# Patient Record
Sex: FEMALE | Race: WHITE | NOT HISPANIC OR LATINO | Employment: UNEMPLOYED | URBAN - METROPOLITAN AREA
[De-identification: names, ages, dates, MRNs, and addresses within clinical notes are randomized per-mention and may not be internally consistent; named-entity substitution may affect disease eponyms.]

---

## 2020-03-30 ENCOUNTER — TRANSFERRED RECORDS (OUTPATIENT)
Dept: HEALTH INFORMATION MANAGEMENT | Facility: CLINIC | Age: 20
End: 2020-03-30

## 2023-02-12 ENCOUNTER — TRANSFERRED RECORDS (OUTPATIENT)
Dept: HEALTH INFORMATION MANAGEMENT | Facility: CLINIC | Age: 23
End: 2023-02-12

## 2023-10-11 ENCOUNTER — TRANSCRIBE ORDERS (OUTPATIENT)
Dept: OTHER | Age: 23
End: 2023-10-11

## 2023-10-11 DIAGNOSIS — Z86.69 HISTORY OF IDIOPATHIC INTRACRANIAL HYPERTENSION: Primary | ICD-10-CM

## 2023-10-30 PROBLEM — J45.30 MILD PERSISTENT ASTHMA WITHOUT COMPLICATION: Status: ACTIVE | Noted: 2020-03-26

## 2023-10-30 PROBLEM — M41.9 SCOLIOSIS: Status: ACTIVE | Noted: 2018-01-28

## 2023-10-30 PROBLEM — K21.9 GERD (GASTROESOPHAGEAL REFLUX DISEASE): Status: ACTIVE | Noted: 2023-09-13

## 2023-10-30 PROBLEM — R51.9 HEADACHE: Status: ACTIVE | Noted: 2023-09-13

## 2023-10-30 PROBLEM — R00.0 TACHYCARDIA: Status: ACTIVE | Noted: 2020-03-26

## 2023-10-30 PROBLEM — R74.02 ELEVATED LDH: Status: ACTIVE | Noted: 2022-05-24

## 2023-10-30 PROBLEM — F43.10 PTSD (POST-TRAUMATIC STRESS DISORDER): Chronic | Status: ACTIVE | Noted: 2023-09-13

## 2023-10-30 PROBLEM — D72.820 ELEVATED LYMPHOCYTE COUNT: Status: ACTIVE | Noted: 2022-05-13

## 2023-10-30 PROBLEM — F84.5 ASPERGER SYNDROME: Status: ACTIVE | Noted: 2018-11-21

## 2023-10-30 PROBLEM — G47.00 INSOMNIA: Status: ACTIVE | Noted: 2023-09-13

## 2023-10-30 RX ORDER — CYCLOBENZAPRINE HCL 10 MG
10 TABLET ORAL 3 TIMES DAILY PRN
COMMUNITY
Start: 2022-02-12

## 2023-10-30 RX ORDER — METOPROLOL SUCCINATE 50 MG/1
1 TABLET, EXTENDED RELEASE ORAL AT BEDTIME
COMMUNITY
Start: 2023-08-02

## 2023-10-30 RX ORDER — BUDESONIDE AND FORMOTEROL FUMARATE DIHYDRATE 80; 4.5 UG/1; UG/1
2 AEROSOL RESPIRATORY (INHALATION) PRN
COMMUNITY
Start: 2023-10-24

## 2023-10-30 RX ORDER — DILTIAZEM HYDROCHLORIDE 30 MG/1
TABLET, FILM COATED ORAL
COMMUNITY
Start: 2022-02-16 | End: 2024-01-10

## 2023-10-30 RX ORDER — MECLIZINE HYDROCHLORIDE 25 MG/1
12.5-25 TABLET ORAL 3 TIMES DAILY PRN
COMMUNITY
Start: 2023-10-24

## 2023-10-30 RX ORDER — HYDROXYZINE PAMOATE 25 MG/1
25 CAPSULE ORAL PRN
COMMUNITY
Start: 2023-08-02

## 2023-10-30 RX ORDER — SERTRALINE HYDROCHLORIDE 25 MG/1
75 TABLET, FILM COATED ORAL AT BEDTIME
COMMUNITY
Start: 2023-11-01

## 2023-10-30 RX ORDER — QUETIAPINE FUMARATE 150 MG/1
1 TABLET, FILM COATED ORAL AT BEDTIME
COMMUNITY
Start: 2023-08-02

## 2023-10-30 RX ORDER — VENLAFAXINE HYDROCHLORIDE 37.5 MG/1
CAPSULE, EXTENDED RELEASE ORAL
COMMUNITY
Start: 2023-10-27 | End: 2023-12-14

## 2023-10-30 NOTE — PROGRESS NOTES
Leigh Sloan is a 23 year old female with the following diagnoses:   1. Optic disc edema    2. History of idiopathic intracranial hypertension         Patient was sent for consultation by Dr. Burnett for Dr. Burnett    HPI:    Leigh Sloan has a history of IIH diagnosed March 2020. She reports that at that time she had an LP done in [place] at her opening pressure was [id number] (records not available). She was shunted emergently the next day and she states that it was secondary to pain. We discussed her visual symptoms at that time and she reports that she had an eye exam prior to the LP which showed bilateral optic disc edema. She does not remember having been told that her visual acuity was decreased nor that she had constriction of visual fields. She never took diamox because she had a sulfa allergy as a child (fever and 1 week of hospitalization).     Her initial symptoms in 2020: pulsatile tinnitus in both ears. Debilitating headaches to the point where she couldn't move due to the pain. Does not remember any TVOs. No diplopia. Does not remember blurry vision.    About 2-3 months ago she started noticing right ear pulsatile tinnitus. Over the next month she started to notice bilateral pulsatile tinnitus. She also noticed headaches are worsening over the last 2-3 months (although much better than 2020). Headaches are occipital/parietal and bilateral. They last about 5 minutes and are a pressure or a sharp sensation. She denies light/sound sensitivity, nausea, and vomiting specific to the headaches. She does have light sensitivity all the time which has been most of her life. She has been noticing blackouts of her vision if she stands too quickly or strains - this lasts less than a minute and is bilateral.    She was evaluated in the ED 9/12/23 and had CT head without ventricular dilation. LP showed opening pressure of 25 cm H2O in the ED (on left side with knees bent and curled into chest).  She had XR shunt series performed which showed no kinking or discontinuity of the tubing and valve set to [id number].    After the LP she noted about 1 day of symptomatic improvement but then had pain with upright positioning and required a blood patch.    She has no recent weight gain. No recent use of tetracyclines. No vitamin A-derived medication    Independent historians:  Patient    Review of outside testin/12/23 lumbar puncture:  Opening pressure 25    23 CT head without contrast  FINDINGS:   Right parietal approach  shunt catheter in place with tip terminating in the left lateral ventricle. No abnormal ventricular dilatation. No extra-axial fluid collections.   Normal brain parenchymal morphology. No acute intracranial hemorrhage, acute infarct, mass effect, fracture. No midline shift. Normal calvarium and skull base. Visualized paranasal sinuses and mastoid air cells are clear.   Normal orbits bilaterally.     10/11/23 XR shunt series  Impression  1. No kinking or discontinuity of the shunt tubing.  2. A programmable shunt valve is set to 140 mm H2O.     My interpretation performed today of outside testing:  I have independently reviewed CT head performed 23.  There are no abnormalities that would explain her papilledema.    Review of outside clinical notes:    10/10/23 -- Visit with Dr. Burnett  Assessment/Plan:  1. History of idiopathic intracranial hypertension  2. Papilledema   -originally diagnosed in , had  shunt . Increase in symptoms recently, transient vision disturbances and headaches with postural changes. Several ER visits in past month, Lumbar puncture 25 at ER visit 23. RNFL OCT images consistent with papilledema with mild swelling noted on ophthalmoscopy. Patient allergic to acetazolamide. Discussed weight loss. Follow up with PCP for referral to weight management department.   3. Myopia of both eyes  -continue with habitual prescription.     23 --  Discharge summary  Ms. Leigh Sloan is a 23 y.o. female with a history of idiopathic intracranial hypertension (s/p  shunt) who presented to the ED with neck pain, back pain and headache.  She was initially seen in clinic 9/12 with complaints of dizziness, blurry vision and intermittent headache for 3-4 weeks. She was diagnosed with idiopathic intracranial hypertension in 2020 and had a  shunt placed in [place], [place] (Dr. Chris Campos, patient cannot remember the clinic). She reports symptoms were similar to when she was first diagnosed prior to her shunt.  Her primary care physician then sent her to the ED for further evaluation.  CT head without acute concerns, noted right parietal  shunt catheter with no ventricular dilatation. Shunt series done but report pending. LP was then done in the ED with opening pressure of 25, no CSF studies done since symptoms not consistent with meningitis or encephalitis.  6 mL CSF removed for therapeutic purposes per notes. It does not appear neurosurgery was contacted prior to LP.  She was referred to neurology and ophthalmology.  She reports very shortly after the LP she developed low back pain at the insertion site that then radiated up her spine to her neck. She took an Aleve without much relief. Then she woke up with a headache behind her eyes she reported up to 10/10 pain at times.  Reports the headache feels different than her usual headaches from intracranial hypertension.  Headache improves when laying flat.  Denies any numbness, tingling or weakness in her extremities.   In the ED, CT of the thoracic and lumbar spine without contrast did not show any hematoma.  She was given Dilaudid and 1 L NS with some improvement in headache. Admitted for pain control and possible blood patch pending neurosurgery consult. Started on decadron and caffeine. Neurosurgery reviewed imaging and agree with blood patch. No further recommendations. Patient underwent blood patch  9/14/2023. Some post procedure discomfort treated with ice pack and tylenol.   Patient symptomatically improved and was medically stable to discharge home 9/14/2023. No medication changes made. She should follow up with Neurology and ophthalmology as previously directed.      Past medical history:    Patient Active Problem List   Diagnosis    ADHD (attention deficit hyperactivity disorder), inattentive type    Asperger syndrome    Benign intracranial hypertension    Dermatillomania    Elevated LDH    Elevated lymphocyte count    GERD (gastroesophageal reflux disease)    Headache    Insomnia    Mild persistent asthma without complication    Postural dizziness    PTSD (post-traumatic stress disorder)    S/P  shunt    Scoliosis    Tachycardia         Medications:     Current Outpatient Medications:     budesonide-formoterol (SYMBICORT) 80-4.5 MCG/ACT Inhaler, Inhale 2 puffs into the lungs, Disp: , Rfl:     cyclobenzaprine (FLEXERIL) 10 MG tablet, Take 10 mg by mouth, Disp: , Rfl:     hydrOXYzine (VISTARIL) 25 MG capsule, Take 25 mg by mouth, Disp: , Rfl:     meclizine (ANTIVERT) 25 MG tablet, Take 12.5-25 mg by mouth, Disp: , Rfl:     meloxicam (MOBIC) 15 MG tablet, Take 15 mg by mouth, Disp: , Rfl:     metoclopramide (REGLAN) 10 MG tablet, Take 10 mg by mouth, Disp: , Rfl:     metoprolol succinate ER (TOPROL XL) 50 MG 24 hr tablet, Take 1 tablet by mouth daily, Disp: , Rfl:     omeprazole (PRILOSEC) 20 MG DR capsule, Take 20 mg by mouth, Disp: , Rfl:     ondansetron (ZOFRAN) 4 MG tablet, Take 4 mg by mouth, Disp: , Rfl:     prazosin (MINIPRESS) 2 MG capsule, Take 1 capsule by mouth at bedtime, Disp: , Rfl:     QUEtiapine Fumarate 150 MG TABS, Take 1 tablet by mouth at bedtime, Disp: , Rfl:     venlafaxine (EFFEXOR XR) 37.5 MG 24 hr capsule, Decrease total daily dose by 37.5 mg orally every three days to taper and discontinue., Disp: , Rfl:     diltiazem (CARDIZEM) 30 MG tablet, TAKE 1 TABLET BY MOUTH EVERY TWELVE  HOURS, Disp: , Rfl:     drospirenone-ethinyl estradiol (SAMMY) 3-0.02 MG tablet, Take 1 tablet by mouth daily, Disp: , Rfl:     [START ON 11/1/2023] sertraline (ZOLOFT) 25 MG tablet, Take 25 mg by mouth once daily for three days, then take 50 mg by mouth once daily for six days, then take 75 mg by mouth daily., Disp: , Rfl:         Family history / social history:  Patient has no family history of macular degeneration or glaucoma    Patient  reports that she has never smoked. She has never used smokeless tobacco.       Exam:  Visual acuity 20/25 right eye 20/20 left eye.  Color vision 11/11 right eye and 11/11 left eye.  Pupils round without afferent pupillary defect.  Intraocular pressure 15 right eye and 16 left eye.  Anterior segment exam unremarkable.  Fundus exam showed bilateral optic disc edema.      Tests ordered and interpreted today:    Glaucoma Top OU          Performed by: brenda   . Patient cooperation: Reliable   .   Great fix. Dilated after vf.     Right Eye  Reliability of the test: Good   . Findings: Nonspecific defect   . Test Findings Free Text: scattered losses   . MD: -2.8   . Plan: Monitor   . Interval: Initial   .     Left Eye  Reliability of the test: Good   . Findings: Nonspecific defect   . Test Findings Free Text: scattered losses   . MD: -2.1   . Plan: Monitor   . Interval: Initial   .          OCT Optic Nerve RNFL Spectralis OU (both eyes)          Performed by: amna   .     Right Eye  Average Thickness Value: 122   . Interpretation Free Text: Superior elevation   . Plan: Adjust medications   . Interval: Initial   .     Left Eye  Average Thickness Value: 139   . Interpretation Free Text: Superior elevation   . Plan: Adjust medications   . Interval: Initial   .              Discussion of management / interpretation with another provider:   I communicated with neurosurgery and they agreed to see her and evaluate her shunt    Assessment/Plan:   It is my impression that patient has bilateral  optic disc edema which likely represents a recurrence of IIH.  Her opening pressure was 25 which is consistent with an elevated opening pressure.  The bilateral optic disc edema in conjunction with increased headaches, pulsatile tinnitus, and TVO are concerning for elevated intracranial pressure.  She recently had her shunt assessed and it was felt that her shunt was in good working order.  She apparently has an adjustable shunt placed and I wonder if it is possible to have it adjusted lower.  I will reach out to neurosurgery to find out who she might see for this.          Attending Physician Attestation:  Complete documentation of historical and exam elements from today's encounter can be found in the full encounter summary report (not reduplicated in this progress note).  I personally obtained the chief complaint(s) and history of present illness.  I confirmed and edited as necessary the review of systems, past medical/surgical history, family history, social history, and examination findings as documented by others; and I examined the patient myself.  I personally reviewed the relevant tests, images, and reports as documented above.  I formulated and edited as necessary the assessment and plan and discussed the findings and management plan with the patient and family. I personally reviewed the ophthalmic test(s) associated with this encounter, agree with the interpretation(s) as documented by the resident/fellow, and have edited the corresponding report(s) as necessary.  - Carlos Cummins MD, PGY3  Ophthalmology Resident  [facility]

## 2023-10-31 ENCOUNTER — OFFICE VISIT (OUTPATIENT)
Dept: OPHTHALMOLOGY | Facility: CLINIC | Age: 23
End: 2023-10-31
Attending: OPTOMETRIST
Payer: COMMERCIAL

## 2023-10-31 DIAGNOSIS — H47.10 OPTIC DISC EDEMA: Primary | ICD-10-CM

## 2023-10-31 DIAGNOSIS — Z86.69 HISTORY OF IDIOPATHIC INTRACRANIAL HYPERTENSION: ICD-10-CM

## 2023-10-31 DIAGNOSIS — H53.40 VISUAL FIELD DEFECT: Primary | ICD-10-CM

## 2023-10-31 PROCEDURE — G0463 HOSPITAL OUTPT CLINIC VISIT: HCPCS | Performed by: OPHTHALMOLOGY

## 2023-10-31 PROCEDURE — 99204 OFFICE O/P NEW MOD 45 MIN: CPT | Mod: GC | Performed by: OPHTHALMOLOGY

## 2023-10-31 PROCEDURE — 92133 CPTRZD OPH DX IMG PST SGM ON: CPT | Performed by: OPHTHALMOLOGY

## 2023-10-31 PROCEDURE — 92083 EXTENDED VISUAL FIELD XM: CPT | Performed by: OPHTHALMOLOGY

## 2023-10-31 RX ORDER — DROSPIRENONE AND ETHINYL ESTRADIOL 0.02-3(28)
1 KIT ORAL AT BEDTIME
COMMUNITY

## 2023-10-31 RX ORDER — METOCLOPRAMIDE 10 MG/1
10 TABLET ORAL PRN
Status: ON HOLD | COMMUNITY
Start: 2023-02-21 | End: 2024-01-31

## 2023-10-31 RX ORDER — MELOXICAM 15 MG/1
15 TABLET ORAL AT BEDTIME
Status: ON HOLD | COMMUNITY
Start: 2022-08-17 | End: 2024-02-09

## 2023-10-31 RX ORDER — ONDANSETRON 4 MG/1
4 TABLET, FILM COATED ORAL EVERY 6 HOURS PRN
Status: ON HOLD | COMMUNITY
Start: 2023-02-09 | End: 2024-01-23

## 2023-10-31 RX ORDER — PRAZOSIN HYDROCHLORIDE 2 MG/1
1 CAPSULE ORAL AT BEDTIME
COMMUNITY
Start: 2022-07-12

## 2023-10-31 ASSESSMENT — CONF VISUAL FIELD
OD_NORMAL: 1
OS_NORMAL: 1
OS_INFERIOR_TEMPORAL_RESTRICTION: 0
OD_SUPERIOR_TEMPORAL_RESTRICTION: 0
METHOD: COUNTING FINGERS
OS_SUPERIOR_TEMPORAL_RESTRICTION: 0
OD_INFERIOR_TEMPORAL_RESTRICTION: 0
OS_INFERIOR_NASAL_RESTRICTION: 0
OD_INFERIOR_NASAL_RESTRICTION: 0
OS_SUPERIOR_NASAL_RESTRICTION: 0
OD_SUPERIOR_NASAL_RESTRICTION: 0

## 2023-10-31 ASSESSMENT — TONOMETRY
OS_IOP_MMHG: 16
IOP_METHOD: ICARE
OD_IOP_MMHG: 15

## 2023-10-31 ASSESSMENT — EXTERNAL EXAM - LEFT EYE: OS_EXAM: NORMAL

## 2023-10-31 ASSESSMENT — REFRACTION_WEARINGRX
OD_SPHERE: -4.50
SPECS_TYPE: SVL
OD_CYLINDER: SPHERE
OS_SPHERE: -5.00
OS_AXIS: 155
OS_CYLINDER: +0.50

## 2023-10-31 ASSESSMENT — VISUAL ACUITY
METHOD: SNELLEN - LINEAR
OD_CC: 20/25
OD_CC+: +2
OS_CC: 20/20
CORRECTION_TYPE: GLASSES

## 2023-10-31 ASSESSMENT — SLIT LAMP EXAM - LIDS
COMMENTS: NORMAL
COMMENTS: NORMAL

## 2023-10-31 ASSESSMENT — EXTERNAL EXAM - RIGHT EYE: OD_EXAM: NORMAL

## 2023-10-31 NOTE — LETTER
10/31/2023         RE:  :  MRN: Leigh Sloan  2000  [id number]     Dear Dr. Burnett,    Thank you for asking me to see your very pleasant patient, Leigh Sloan, in neuro-ophthalmic consultation.  I would like to thank you for sending your records and I have summarized them in the history of present illness.  My assessment and plan are below.  For further details, please see my attached clinic note.      Leigh Sloan is a 23 year old female with the following diagnoses:   1. Optic disc edema    2. History of idiopathic intracranial hypertension         Patient was sent for consultation by Dr. Burnett for Dr. Burnett    HPI:    Leigh Sloan has a history of IIH diagnosed 2020. She reports that at that time she had an LP done in [place] at her opening pressure was [id number] (records not available). She was shunted emergently the next day and she states that it was secondary to pain. We discussed her visual symptoms at that time and she reports that she had an eye exam prior to the LP which showed bilateral optic disc edema. She does not remember having been told that her visual acuity was decreased nor that she had constriction of visual fields. She never took diamox because she had a sulfa allergy as a child (fever and 1 week of hospitalization).     Her initial symptoms in : pulsatile tinnitus in both ears. Debilitating headaches to the point where she couldn't move due to the pain. Does not remember any TVOs. No diplopia. Does not remember blurry vision.    About 2-3 months ago she started noticing right ear pulsatile tinnitus. Over the next month she started to notice bilateral pulsatile tinnitus. She also noticed headaches are worsening over the last 2-3 months (although much better than ). Headaches are occipital/parietal and bilateral. They last about 5 minutes and are a pressure or a sharp sensation. She denies light/sound sensitivity, nausea, and vomiting  specific to the headaches. She does have light sensitivity all the time which has been most of her life. She has been noticing blackouts of her vision if she stands too quickly or strains - this lasts less than a minute and is bilateral.    She was evaluated in the ED 23 and had CT head without ventricular dilation. LP showed opening pressure of 25 cm H2O in the ED (on left side with knees bent and curled into chest). She had XR shunt series performed which showed no kinking or discontinuity of the tubing and valve set to [id number].    After the LP she noted about 1 day of symptomatic improvement but then had pain with upright positioning and required a blood patch.    She has no recent weight gain. No recent use of tetracyclines. No vitamin A-derived medication    Independent historians:  Patient    Review of outside testin/12/23 lumbar puncture:  Opening pressure 25    23 CT head without contrast  FINDINGS:   Right parietal approach  shunt catheter in place with tip terminating in the left lateral ventricle. No abnormal ventricular dilatation. No extra-axial fluid collections.   Normal brain parenchymal morphology. No acute intracranial hemorrhage, acute infarct, mass effect, fracture. No midline shift. Normal calvarium and skull base. Visualized paranasal sinuses and mastoid air cells are clear.   Normal orbits bilaterally.     10/11/23 XR shunt series  Impression  1. No kinking or discontinuity of the shunt tubing.  2. A programmable shunt valve is set to 140 mm H2O.     My interpretation performed today of outside testing:  I have independently reviewed CT head performed 23.  There are no abnormalities that would explain her papilledema.    Review of outside clinical notes:    10/10/23 -- Visit with Dr. Burnett  Assessment/Plan:  1. History of idiopathic intracranial hypertension  2. Papilledema   -originally diagnosed in , had  shunt . Increase in symptoms recently, transient  vision disturbances and headaches with postural changes. Several ER visits in past month, Lumbar puncture 25 at ER visit 09/12/23. RNFL OCT images consistent with papilledema with mild swelling noted on ophthalmoscopy. Patient allergic to acetazolamide. Discussed weight loss. Follow up with PCP for referral to weight management department.   3. Myopia of both eyes  -continue with habitual prescription.     9/14/23 -- Discharge summary  Ms. Leigh Sloan is a 23 y.o. female with a history of idiopathic intracranial hypertension (s/p  shunt) who presented to the ED with neck pain, back pain and headache.  She was initially seen in clinic 9/12 with complaints of dizziness, blurry vision and intermittent headache for 3-4 weeks. She was diagnosed with idiopathic intracranial hypertension in 2020 and had a  shunt placed in [place], [place] (Dr. Chris Campos, patient cannot remember the clinic). She reports symptoms were similar to when she was first diagnosed prior to her shunt.  Her primary care physician then sent her to the ED for further evaluation.  CT head without acute concerns, noted right parietal  shunt catheter with no ventricular dilatation. Shunt series done but report pending. LP was then done in the ED with opening pressure of 25, no CSF studies done since symptoms not consistent with meningitis or encephalitis.  6 mL CSF removed for therapeutic purposes per notes. It does not appear neurosurgery was contacted prior to LP.  She was referred to neurology and ophthalmology.  She reports very shortly after the LP she developed low back pain at the insertion site that then radiated up her spine to her neck. She took an Aleve without much relief. Then she woke up with a headache behind her eyes she reported up to 10/10 pain at times.  Reports the headache feels different than her usual headaches from intracranial hypertension.  Headache improves when laying flat.  Denies any numbness, tingling or weakness  in her extremities.   In the ED, CT of the thoracic and lumbar spine without contrast did not show any hematoma.  She was given Dilaudid and 1 L NS with some improvement in headache. Admitted for pain control and possible blood patch pending neurosurgery consult. Started on decadron and caffeine. Neurosurgery reviewed imaging and agree with blood patch. No further recommendations. Patient underwent blood patch 9/14/2023. Some post procedure discomfort treated with ice pack and tylenol.   Patient symptomatically improved and was medically stable to discharge home 9/14/2023. No medication changes made. She should follow up with Neurology and ophthalmology as previously directed.      Past medical history:  Patient Active Problem List   Diagnosis    ADHD (attention deficit hyperactivity disorder), inattentive type    Asperger syndrome    Benign intracranial hypertension    Dermatillomania    Elevated LDH    Elevated lymphocyte count    GERD (gastroesophageal reflux disease)    Headache    Insomnia    Mild persistent asthma without complication    Postural dizziness    PTSD (post-traumatic stress disorder)    S/P  shunt    Scoliosis    Tachycardia       Medications:   Current Outpatient Medications:     budesonide-formoterol (SYMBICORT) 80-4.5 MCG/ACT Inhaler, Inhale 2 puffs into the lungs, Disp: , Rfl:     cyclobenzaprine (FLEXERIL) 10 MG tablet, Take 10 mg by mouth, Disp: , Rfl:     hydrOXYzine (VISTARIL) 25 MG capsule, Take 25 mg by mouth, Disp: , Rfl:     meclizine (ANTIVERT) 25 MG tablet, Take 12.5-25 mg by mouth, Disp: , Rfl:     meloxicam (MOBIC) 15 MG tablet, Take 15 mg by mouth, Disp: , Rfl:     metoclopramide (REGLAN) 10 MG tablet, Take 10 mg by mouth, Disp: , Rfl:     metoprolol succinate ER (TOPROL XL) 50 MG 24 hr tablet, Take 1 tablet by mouth daily, Disp: , Rfl:     omeprazole (PRILOSEC) 20 MG DR capsule, Take 20 mg by mouth, Disp: , Rfl:     ondansetron (ZOFRAN) 4 MG tablet, Take 4 mg by mouth, Disp: ,  Rfl:     prazosin (MINIPRESS) 2 MG capsule, Take 1 capsule by mouth at bedtime, Disp: , Rfl:     QUEtiapine Fumarate 150 MG TABS, Take 1 tablet by mouth at bedtime, Disp: , Rfl:     venlafaxine (EFFEXOR XR) 37.5 MG 24 hr capsule, Decrease total daily dose by 37.5 mg orally every three days to taper and discontinue., Disp: , Rfl:     diltiazem (CARDIZEM) 30 MG tablet, TAKE 1 TABLET BY MOUTH EVERY TWELVE HOURS, Disp: , Rfl:     drospirenone-ethinyl estradiol (SAMMY) 3-0.02 MG tablet, Take 1 tablet by mouth daily, Disp: , Rfl:     [START ON 11/1/2023] sertraline (ZOLOFT) 25 MG tablet, Take 25 mg by mouth once daily for three days, then take 50 mg by mouth once daily for six days, then take 75 mg by mouth daily., Disp: , Rfl:       Family history / social history:  Patient has no family history of macular degeneration or glaucoma    Patient  reports that she has never smoked. She has never used smokeless tobacco.     Exam:  Visual acuity 20/25 right eye 20/20 left eye.  Color vision 11/11 right eye and 11/11 left eye.  Pupils round without afferent pupillary defect.  Intraocular pressure 15 right eye and 16 left eye.  Anterior segment exam unremarkable.  Fundus exam showed bilateral optic disc edema.      Tests ordered and interpreted today:    Glaucoma Top OU    Performed by: brenda   . Patient cooperation: Reliable   .   Great fix. Dilated after vf.     Right Eye  Reliability of the test: Good   . Findings: Nonspecific defect   . Test Findings Free Text: scattered losses   . MD: -2.8   . Plan: Monitor   . Interval: Initial   .     Left Eye  Reliability of the test: Good   . Findings: Nonspecific defect   . Test Findings Free Text: scattered losses   . MD: -2.1   . Plan: Monitor   . Interval: Initial     OCT Optic Nerve RNFL Spectralis OU (both eyes)    Performed by: amna     Right Eye  Average Thickness Value: 122   . Interpretation Free Text: Superior elevation   . Plan: Adjust medications   . Interval: Initial   .      Left Eye  Average Thickness Value: 139   . Interpretation Free Text: Superior elevation   . Plan: Adjust medications   . Interval: Initial     Discussion of management / interpretation with another provider:   I communicated with neurosurgery and they agreed to see her and evaluate her shunt    Assessment/Plan: It is my impression that patient has bilateral optic disc edema which likely represents a recurrence of IIH.  Her opening pressure was 25 which is consistent with an elevated opening pressure.  The bilateral optic disc edema in conjunction with increased headaches, pulsatile tinnitus, and TVO are concerning for elevated intracranial pressure.  She recently had her shunt assessed and it was felt that her shunt was in good working order.  She apparently has an adjustable shunt placed and I wonder if it is possible to have it adjusted lower.  I will reach out to neurosurgery to find out who she might see for this.    Again, thank you for allowing me to participate in the care of your patient.      Sincerely,    Carlos Brand MD  Professor  Ophthalmology Residency   Director of Neuro-Ophthalmology  [facility] End[facility] Chair  Departments of Ophthalmology, Neurology, and Neurosurgery  [address]  [address]  T - [phone]  F - [phone]  [email or web address]      CC: Suman Burnett, OD  [address] [facility] [address]  Via Fax: [phone]    DX = IIH, status post  shunt, recurrence

## 2023-11-01 ENCOUNTER — MYC MEDICAL ADVICE (OUTPATIENT)
Dept: NEUROSURGERY | Facility: CLINIC | Age: 23
End: 2023-11-01
Payer: COMMERCIAL

## 2023-11-01 DIAGNOSIS — Z98.2 S/P VP SHUNT: Primary | ICD-10-CM

## 2023-11-01 NOTE — TELEPHONE ENCOUNTER
LVM requesting call back to writer's direct line. Requested shunt information from patient and discussed need for appointment in neurosurgery clinic to adjust shunt settings. If patient does not know what type of shunt she has, will need a skull XR prior to appointment.

## 2023-11-07 NOTE — TELEPHONE ENCOUNTER
Patient does not know what type of shunt she has other than  shunt. Discussed that patient would need a skull XR prior to her neurosurgery visit to determine type. Patient verbalized agreement to this plan.

## 2023-11-14 NOTE — TELEPHONE ENCOUNTER
Records Requested     November 14, 2023 4:17 PM   [id number]   Facility  [facility]   Outcome 4:23 pm Sent request for imaging to be pushed to PACS. -TANVI     Records Requested     November 14, 2023 4:17 PM   [id number]   Facility  [address] [address]  P: [phone]   Fax: [phone]  Tracking # [id number]   Outcome 10:22 am Sent request for imaging disc to be sent to [facility]. -TANVI Cline on 11/16/2023 at 12:28 PM Records received via fax; sent to [facility]. -TANVI Cline on 11/20/2023 at 1:10 PM Imaging disc received and sent to  for processing. -TANVI     RECORDS RECEIVED FROM: Care Everywhere   REASON FOR VISIT: Papilledema   Date of Appt: 12/13/23 @ 1:00 pm    NOTES (FOR ALL VISITS) STATUS DETAILS   OFFICE NOTE from referring provider Care Everywhere 10/10/23 Suman Burnett OD @[facility]     OFFICE NOTE from other specialist Care Everywhere 10/31/23 Carlos Brand MD @[facility]    9/28/23, 9/12/23 Yesenia, Kevin Merlin, MD  @[facility]    2/21/23 Dominga Lutz NP @[facility]     DISCHARGE REPORT from the ER Care Everywhere 9/19/23 Jakob Michel DO  @[facility] ED    9/13/23-9/14/23 Ida Varner MD  @[facility] ED    9/12/23 Thien Anton MD  @[facility] ED    2/12/23 Jakob Randle DO  @[facility] ED     OPHTHALMOLOGY REPORT Care Everywhere [facility]  10/31/23 OCT Optic Nerve RNFL Spectralis each eye  10/31/23 Glaucoma Top each eye     MEDICATION LIST Internal    IMAGING  (FOR ALL VISITS)     X-RAY In process [facility] *  9/15/23 XR Shunt Series     MRI (HEAD, NECK, SPINE) In process [facility]*  11/13/19 MR Brain     CT (HEAD, NECK, SPINE) In process [facility]  9/12/23 CT Head    [facility]*  2/12/23 CT Head Brain  7/7/22 CT Head  10/6/20 CT Head Brain

## 2023-12-13 ENCOUNTER — ANCILLARY PROCEDURE (OUTPATIENT)
Dept: GENERAL RADIOLOGY | Facility: CLINIC | Age: 23
End: 2023-12-13
Attending: PHYSICIAN ASSISTANT
Payer: COMMERCIAL

## 2023-12-13 ENCOUNTER — PRE VISIT (OUTPATIENT)
Dept: NEUROSURGERY | Facility: CLINIC | Age: 23
End: 2023-12-13

## 2023-12-13 ENCOUNTER — ANCILLARY PROCEDURE (OUTPATIENT)
Dept: GENERAL RADIOLOGY | Facility: CLINIC | Age: 23
End: 2023-12-13
Attending: NEUROLOGICAL SURGERY
Payer: COMMERCIAL

## 2023-12-13 ENCOUNTER — OFFICE VISIT (OUTPATIENT)
Dept: NEUROSURGERY | Facility: CLINIC | Age: 23
End: 2023-12-13
Payer: COMMERCIAL

## 2023-12-13 VITALS
SYSTOLIC BLOOD PRESSURE: 120 MMHG | OXYGEN SATURATION: 95 % | WEIGHT: 262.1 LBS | BODY MASS INDEX: 43.67 KG/M2 | DIASTOLIC BLOOD PRESSURE: 75 MMHG | HEART RATE: 117 BPM | HEIGHT: 65 IN

## 2023-12-13 DIAGNOSIS — Z98.2 S/P VP SHUNT: ICD-10-CM

## 2023-12-13 DIAGNOSIS — Z98.2 S/P VP SHUNT: Primary | ICD-10-CM

## 2023-12-13 PROCEDURE — 70250 X-RAY EXAM OF SKULL: CPT | Mod: GC | Performed by: RADIOLOGY

## 2023-12-13 PROCEDURE — 62252 CSF SHUNT REPROGRAM: CPT | Performed by: PHYSICIAN ASSISTANT

## 2023-12-13 PROCEDURE — 99204 OFFICE O/P NEW MOD 45 MIN: CPT | Mod: 25 | Performed by: PHYSICIAN ASSISTANT

## 2023-12-13 PROCEDURE — 70250 X-RAY EXAM OF SKULL: CPT | Performed by: RADIOLOGY

## 2023-12-13 NOTE — NURSING NOTE
"Chief Complaint   Patient presents with    Consult     Per patient brain shunt adjustment   /75   Pulse 117   Ht 1.651 m (5' 5\")   Wt 118.9 kg (262 lb 1.6 oz)   SpO2 95%   BMI 43.62 kg/m      Tanesha Farooq CMA at 12:46 PM on 12/13/2023.        "

## 2023-12-13 NOTE — PROGRESS NOTES
[facility]  Department of Neurosurgery  Center for Skull Base and Pituitary Surgery    Name: Leigh Sloan  MRN: [id number]  Age: 23 year old  : 2023      Chief Complaint:   Intracranial hypertension s/p right parietal  shunt placement (Codman Hakim at 130mm H2O) 3/2020 ([place], Dr. Bright), new patient consult for papilledema    History of Present Illness:   Leigh Sloan is a 23 year old female with a history of ADHD, Asperger syndrome, asthma, and history of IIH s/p  shunt in  who is here today as a new patient. She had an LP done in [place] in 3/2020 due to bilateral pulsatile tinnitus and severe headaches, found with an opening pressure of 40 per patient report. Her shunt was placed next day by Dr. Bright. She did well until September of this year when she had worsening headaches and recurrence of her pulsatile tinnitus. She presented to the ED 2023 and was evaluated with LP with opening pressure of 25 cm H2O. At that time head CT showed shunted ventricular system without ventriculomegaly. She also had a shunt series without abnormality. She had post LP headaches and required a blood patch. She then saw Dr. 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 Dr. Brand referred her here in hopes that we could adjust her shunt setting to help with her recurrent papilledema. Today in clinic Leigh reports positional headaches which are sharp in nature and resolve fairly quickly. She also notes intermittent, short lived blurry vision which occurs only with position changes or if she exerts herself; this symptom resolves within 1-2 minutes of stopping the activity. Her pulsatile tinnitus has persisted. She denies new fever, chills, vomiting, weakness, or falls. Her Codman Hakim  shunt system has not been programmed since implantation in .  Of note, she has a sulfa allergy so diamox was not used in the  past.     Review of Systems:   Pertinent items are noted in HPI or as in patient entered ROS below, remainder of complete ROS is negative.     Active Medications:     Current Outpatient Medications:     budesonide-formoterol (SYMBICORT) 80-4.5 MCG/ACT Inhaler, Inhale 2 puffs into the lungs, Disp: , Rfl:     cyclobenzaprine (FLEXERIL) 10 MG tablet, Take 10 mg by mouth, Disp: , Rfl:     diltiazem (CARDIZEM) 30 MG tablet, TAKE 1 TABLET BY MOUTH EVERY TWELVE HOURS, Disp: , Rfl:     drospirenone-ethinyl estradiol (SAMMY) 3-0.02 MG tablet, Take 1 tablet by mouth daily, Disp: , Rfl:     hydrOXYzine (VISTARIL) 25 MG capsule, Take 25 mg by mouth, Disp: , Rfl:     meclizine (ANTIVERT) 25 MG tablet, Take 12.5-25 mg by mouth, Disp: , Rfl:     meloxicam (MOBIC) 15 MG tablet, Take 15 mg by mouth, Disp: , Rfl:     metoclopramide (REGLAN) 10 MG tablet, Take 10 mg by mouth, Disp: , Rfl:     metoprolol succinate ER (TOPROL XL) 50 MG 24 hr tablet, Take 1 tablet by mouth daily, Disp: , Rfl:     omeprazole (PRILOSEC) 20 MG DR capsule, Take 20 mg by mouth, Disp: , Rfl:     ondansetron (ZOFRAN) 4 MG tablet, Take 4 mg by mouth, Disp: , Rfl:     prazosin (MINIPRESS) 2 MG capsule, Take 1 capsule by mouth at bedtime, Disp: , Rfl:     QUEtiapine Fumarate 150 MG TABS, Take 1 tablet by mouth at bedtime, Disp: , Rfl:     sertraline (ZOLOFT) 25 MG tablet, Take 25 mg by mouth once daily for three days, then take 50 mg by mouth once daily for six days, then take 75 mg by mouth daily., Disp: , Rfl:     venlafaxine (EFFEXOR XR) 37.5 MG 24 hr capsule, Decrease total daily dose by 37.5 mg orally every three days to taper and discontinue., Disp: , Rfl:       Allergies:   Sulfa antibiotics, Sulfamethoxazole-trimethoprim, and Trimethoprim      Past Medical History:  Past Medical History:   Diagnosis Date    Acid reflux     Asthma     High cholesterol     Non-alcoholic fatty liver disease     Scoliosis      Patient Active Problem List   Diagnosis    ADHD  "(attention deficit hyperactivity disorder), inattentive type    Asperger syndrome    Benign intracranial hypertension    Dermatillomania    Elevated LDH    Elevated lymphocyte count    GERD (gastroesophageal reflux disease)    Headache    Insomnia    Mild persistent asthma without complication    Postural dizziness    PTSD (post-traumatic stress disorder)    S/P  shunt    Scoliosis    Tachycardia        Past Surgical History:  No past surgical history on file.    Family History:   Family History   Problem Relation Age of Onset    Nystagmus No family hx of     Strabismus No family hx of     Amblyopia No family hx of     Glasses (<7 y/o) No family hx of          Social History:   Social History     Tobacco Use    Smoking status: Never    Smokeless tobacco: Never        Physical Exam:   /75   Pulse 117   Ht 1.651 m (5' 5\")   Wt 118.9 kg (262 lb 1.6 oz)   SpO2 95%   BMI 43.62 kg/m     General: No acute distress.   Eyes: Conjunctivae are normal.  MSK: Moves all extremities.  No obvious deformity.  Neuro: The patient is fully oriented. Speech is normal. Facial nerve function is normal, rated as a House Brackmann 1. Gait is normal  Psych: Normal mood and affect. Behavior is normal.      Procedure:  With the patient's permission, her right parietal shunt valve was palpated and the programming wand was placed over the valve. Selection of 120 was made on the [employer] VPV . Multiple attempts were made with no confirmatory beep. My colleague, Carmela Lara CNP, also attempted to program Ms. Sloan's shunt today without success.     Imaging:  Post procedure the patient went down for repeat skull xray and unfortunately this looks like her valve is still set to 130     Assessment:  Intracranial hypertension s/p right parietal  shunt placement (Madeline Kennedy at 130mm H2O) [place] new patient consult for papilledema    Plan:  Discussed with Dr. Cabrera and reviewed my attempts in clinic to " reprogram her Hakim valve. We will bring her in tomorrow for one more attempt and also discussed the potential need for swapping out her valve if we are unsuccessful. Ms. Sloan is unfortunately leaving for a 2 week trip out of town this weekend, back the first week of January, so we will discuss timing with Dr. Cabrera should we need to intervene surgically.       Dolly Garcia PA-C  Department of Neurosurgery  Center for Skull Base and Pituitary Surgery  [facility]

## 2023-12-14 ENCOUNTER — OFFICE VISIT (OUTPATIENT)
Dept: NEUROSURGERY | Facility: CLINIC | Age: 23
End: 2023-12-14
Payer: COMMERCIAL

## 2023-12-14 ENCOUNTER — ANCILLARY PROCEDURE (OUTPATIENT)
Dept: GENERAL RADIOLOGY | Facility: CLINIC | Age: 23
End: 2023-12-14
Attending: NURSE PRACTITIONER
Payer: COMMERCIAL

## 2023-12-14 VITALS
WEIGHT: 262 LBS | SYSTOLIC BLOOD PRESSURE: 114 MMHG | OXYGEN SATURATION: 97 % | HEART RATE: 97 BPM | RESPIRATION RATE: 16 BRPM | HEIGHT: 65 IN | DIASTOLIC BLOOD PRESSURE: 77 MMHG | BODY MASS INDEX: 43.65 KG/M2

## 2023-12-14 DIAGNOSIS — Z98.2 S/P VP SHUNT: ICD-10-CM

## 2023-12-14 DIAGNOSIS — Z98.2 S/P VP SHUNT: Primary | ICD-10-CM

## 2023-12-14 PROCEDURE — 70250 X-RAY EXAM OF SKULL: CPT | Performed by: RADIOLOGY

## 2023-12-14 PROCEDURE — 62252 CSF SHUNT REPROGRAM: CPT | Performed by: NURSE PRACTITIONER

## 2023-12-14 PROCEDURE — 99215 OFFICE O/P EST HI 40 MIN: CPT | Mod: 25 | Performed by: NURSE PRACTITIONER

## 2023-12-14 RX ORDER — ALBUTEROL SULFATE 90 UG/1
AEROSOL, METERED RESPIRATORY (INHALATION)
COMMUNITY
Start: 2023-10-05 | End: 2024-01-10

## 2023-12-14 RX ORDER — CETIRIZINE HYDROCHLORIDE 10 MG/1
10 TABLET ORAL PRN
COMMUNITY

## 2023-12-14 RX ORDER — ONDANSETRON 4 MG/1
TABLET, ORALLY DISINTEGRATING ORAL
Status: ON HOLD | COMMUNITY
Start: 2023-02-21 | End: 2024-01-31

## 2023-12-14 ASSESSMENT — PAIN SCALES - GENERAL: PAINLEVEL: MILD PAIN (3)

## 2023-12-14 NOTE — LETTER
12/14/2023       RE: Leigh Sloan  [address]  [place] [facility] [address]       Dear Colleague,    Thank you for referring your patient, Leigh Sloan, to the [facility] [place] [facility]. Please see a copy of my visit note below.        Neurosurgery Clinic Note    Chief Complaint: shunt check/reprogram     DATE OF VISIT: 12/14/2023    HPI: Leigh Sloan is a pleasant 23 year old female with history of IIH s/p Codman Hakim ventriculoperitoneal [place]unt [place] @130 [place]   Prior to placement patient states she had developed vision loss and pulsatile tinnitus.  Following placement of shunt the patient states her symptoms improved.   Approximately 2-3 months ago the patient began to note worsening headaches, return of pulsatile tinnitus, worsening of vision.  She presented to the ED 9/12/2023 and was evaluated with LP with opening pressure of 25 cm H2O. At that time head CT showed shunted ventricular system without ventriculomegaly. She also had a shunt series without abnormality. She had post LP headaches and required a blood patch. She then saw Dr. 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 Dr. Brand referred her here in hopes that we could adjust her shunt setting to help with her recurrent papilledema.   Given these findings it was felt the patient's valve should be adjusted to a lower setting.  Patient presented to clinic yesterday and was seen by my colleague, Dolly Garcia PA-C, for reprogramming of her shunt.  Unfortunately, after multiple attempts and confirmation x-rays, the valve was note responding to the  and did not dial down to the desired setting.   Patient presents today to re-attempt reprogramming of her valve.    Currently, the patient continues to note pulsatile tinnitus and positional headaches.  She states she  "continues to have vision changes but states that these changes are overall stable compared to when she was seen by Dr. Brand in Neuro Ophthalmology in October.  She denies new fever, chills, vomiting, weakness, or falls.         Review of Systems   Negative except in HPI    Past Medical History:   Diagnosis Date    Acid reflux     Asthma     High cholesterol     Non-alcoholic fatty liver disease     Scoliosis        No past surgical history on file.    Social History     Socioeconomic History    Marital status: Single     Spouse name: None    Number of children: None    Years of education: None    Highest education level: None   Tobacco Use    Smoking status: Never    Smokeless tobacco: Never       family history is not on file.              Physical Exam   /77 (BP Location: Right arm, Patient Position: Sitting)   Pulse 97   Resp 16   Ht 1.651 m (5' 5\")   Wt 118.8 kg (262 lb)   SpO2 97%   BMI 43.60 kg/m    Constitutional: Oriented to person, place, and time. Appears well-developed and well-nourished. Cooperative. No distress.   HENT: Head normocephalic and atraumatic.   Neurological: alert and oriented to person, place, and time. CN II-XII grossly  intact      STRENGTH LEFT RIGHT   Deltoid 5 5   Bicep 5 5   Wrist Extensor 5 5   Tricep 5 5   Finger flexion 5 5   Finger abduction 5 5    5 5       Hip Flexion     5     5   Knee Extension 5 5   Ankle Dorsiflexion 5 5   Extensor Hallucis Longus 5 5   Plantar Flexion 5 5   Foot eversion 5 5   Foot inversion 5 5       Skin: Skin is warm, dry and intact.   Psychiatric: Normal mood and affect. Speech is normal and behavior is normal.    Procedure:   I attempted over 10 times to reprogram the patient's Hakim valve with [employer] program without success.   Omar Gibson MD also attempted to reprogram the valve without success.           IMAGING:  Personally reviewed skull x-ray dated 12/14/2023:   It appears the patient Hakim valve remains at 130mm H20.   No " fracture or disconnection in system otherwise noted.     ASSESSMENT:  Leigh Sloan is a 23 year old female with history of Idiopathic Intracranial Hypertension with Codam Hakim valve @ [id number], currently describing worsening headaches and vision changes with known papilledema.     PLAN:    Unfortunately, after multiple attempts with two different programmers, I was unsuccessful in reprogramming the valve.  Patient will be discussed with Dr. Calvin Cabrera, once plan is established the patient will be updated.   This was all explained to the patient who states understanding.   Symptoms that should prompt urgent medical attention were discussed with patient including worsening vision and headache.               I spent over 60 minutes in patient care with greater than 50% spent in counseling and/or coordination of care.     I performed independent visualization of radiographic imaging and entered my own interpretation, reviewed and/or ordered tests in radiology        Rm 55      Again, thank you for allowing me to participate in the care of your patient.      Sincerely,    NOHEMI Piedra CNP

## 2023-12-14 NOTE — PROGRESS NOTES
Neurosurgery Clinic Note    Chief Complaint: shunt check/reprogram     DATE OF VISIT: 12/14/2023    HPI: Leigh Sloan is a pleasant 23 year old female with history of IIH s/p Codman Hakim ventriculoperitoneal shunt [place] @130 [place]   Prior to placement patient states she had developed vision loss and pulsatile tinnitus.  Following placement of shunt the patient states her symptoms improved.   Approximately 2-3 months ago the patient began to note worsening headaches, return of pulsatile tinnitus, worsening of vision.  She presented to the ED 9/12/2023 and was evaluated with LP with opening pressure of 25 cm H2O. At that time head CT showed shunted ventricular system without ventriculomegaly. She also had a shunt series without abnormality. She had post LP headaches and required a blood patch. She then saw Dr. 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 Dr. Brand referred her here in hopes that we could adjust her shunt setting to help with her recurrent papilledema.   Given these findings it was felt the patient's valve should be adjusted to a lower setting.  Patient presented to clinic yesterday and was seen by my colleague, Dolly Garcia PA-C, for reprogramming of her shunt.  Unfortunately, after multiple attempts and confirmation x-rays, the valve was note responding to the  and did not dial down to the desired setting.   Patient presents today to re-attempt reprogramming of her valve.    Currently, the patient continues to note pulsatile tinnitus and positional headaches.  She states she continues to have vision changes but states that these changes are overall stable compared to when she was seen by Dr. Brand in Neuro Ophthalmology in October.  She denies new fever, chills, vomiting, weakness, or falls.         Review of Systems   Negative except in HPI    Past Medical History:   Diagnosis Date    Acid reflux      "Asthma     High cholesterol     Non-alcoholic fatty liver disease     Scoliosis        No past surgical history on file.    Social History     Socioeconomic History    Marital status: Single     Spouse name: None    Number of children: None    Years of education: None    Highest education level: None   Tobacco Use    Smoking status: Never    Smokeless tobacco: Never       family history is not on file.              Physical Exam   /77 (BP Location: Right arm, Patient Position: Sitting)   Pulse 97   Resp 16   Ht 1.651 m (5' 5\")   Wt 118.8 kg (262 lb)   SpO2 97%   BMI 43.60 kg/m    Constitutional: Oriented to person, place, and time. Appears well-developed and well-nourished. Cooperative. No distress.   HENT: Head normocephalic and atraumatic.   Neurological: alert and oriented to person, place, and time. CN II-XII grossly  intact      STRENGTH LEFT RIGHT   Deltoid 5 5   Bicep 5 5   Wrist Extensor 5 5   Tricep 5 5   Finger flexion 5 5   Finger abduction 5 5    5 5       Hip Flexion     5     5   Knee Extension 5 5   Ankle Dorsiflexion 5 5   Extensor Hallucis Longus 5 5   Plantar Flexion 5 5   Foot eversion 5 5   Foot inversion 5 5       Skin: Skin is warm, dry and intact.   Psychiatric: Normal mood and affect. Speech is normal and behavior is normal.    Procedure:   I attempted over 10 times to reprogram the patient's Hakim valve with [employer] program without success.   Omar Gibson MD also attempted to reprogram the valve without success.           IMAGING:  Personally reviewed skull x-ray dated 12/14/2023:   It appears the patient Hakim valve remains at 130mm H20.   No fracture or disconnection in system otherwise noted.     ASSESSMENT:  Leigh Sloan is a 23 year old female with history of Idiopathic Intracranial Hypertension with Codam Hakim valve @ [id number], currently describing worsening headaches and vision changes with known papilledema.     PLAN:    Unfortunately, after multiple " attempts with two different programmers, I was unsuccessful in reprogramming the valve.  Patient will be discussed with Dr. Calvin Cabrera, once plan is established the patient will be updated.   This was all explained to the patient who states understanding.   Symptoms that should prompt urgent medical attention were discussed with patient including worsening vision and headache.               I spent over 60 minutes in patient care with greater than 50% spent in counseling and/or coordination of care.     I performed independent visualization of radiographic imaging and entered my own interpretation, reviewed and/or ordered tests in radiology        NOHEMI Salter, CNP  Department of Neurosurgery  Pager: [phone]

## 2023-12-15 ENCOUNTER — TELEPHONE (OUTPATIENT)
Dept: NEUROSURGERY | Facility: CLINIC | Age: 23
End: 2023-12-15
Payer: COMMERCIAL

## 2023-12-15 NOTE — CONFIDENTIAL NOTE
I spoke with Leigh regarding next steps. She leaves tomorrow for [place], will be gone for 2 weeks. Her symptoms are intermittent and positional including headaches and blurry vision episodes. Constant tinnitus.     I will work on setting up a virtual visit with Dr. Cabrera to discuss next steps. She was advised to present to the ED with progressive worsening while she's home.     Dolly Garcia PA-C  Department of Neurosurgery  Center for Skull Base and Pituitary Surgery  [facility]

## 2024-01-03 ENCOUNTER — ANCILLARY PROCEDURE (OUTPATIENT)
Dept: CT IMAGING | Facility: CLINIC | Age: 24
End: 2024-01-03
Attending: NEUROLOGICAL SURGERY
Payer: COMMERCIAL

## 2024-01-03 ENCOUNTER — OFFICE VISIT (OUTPATIENT)
Dept: NEUROSURGERY | Facility: CLINIC | Age: 24
End: 2024-01-03
Payer: COMMERCIAL

## 2024-01-03 VITALS
WEIGHT: 265.5 LBS | BODY MASS INDEX: 44.24 KG/M2 | DIASTOLIC BLOOD PRESSURE: 77 MMHG | OXYGEN SATURATION: 97 % | HEART RATE: 97 BPM | SYSTOLIC BLOOD PRESSURE: 115 MMHG | HEIGHT: 65 IN | RESPIRATION RATE: 18 BRPM

## 2024-01-03 DIAGNOSIS — G93.2 IIH (IDIOPATHIC INTRACRANIAL HYPERTENSION): Primary | ICD-10-CM

## 2024-01-03 DIAGNOSIS — G93.2 IIH (IDIOPATHIC INTRACRANIAL HYPERTENSION): ICD-10-CM

## 2024-01-03 PROCEDURE — 99215 OFFICE O/P EST HI 40 MIN: CPT | Performed by: NEUROLOGICAL SURGERY

## 2024-01-03 PROCEDURE — 70450 CT HEAD/BRAIN W/O DYE: CPT | Mod: GC | Performed by: RADIOLOGY

## 2024-01-03 RX ORDER — CEFAZOLIN SODIUM IN 0.9 % NACL 3 G/100 ML
3 INTRAVENOUS SOLUTION, PIGGYBACK (ML) INTRAVENOUS SEE ADMIN INSTRUCTIONS
Status: CANCELLED | OUTPATIENT
Start: 2024-01-03

## 2024-01-03 RX ORDER — CEFAZOLIN SODIUM IN 0.9 % NACL 3 G/100 ML
3 INTRAVENOUS SOLUTION, PIGGYBACK (ML) INTRAVENOUS
Status: CANCELLED | OUTPATIENT
Start: 2024-01-03

## 2024-01-03 NOTE — NURSING NOTE
Nurse Teaching  Relevant Diagnosis: IIH s/p  shunt  Teaching Topic:  shunt, pre/lynette/postop instructions  Person(s) involved in teaching: Patient    Reviewed all instructions in AVS, including hospital COVID-19 restrictions, NPO prior to surgery, showering before surgery instructions (received antiseptic surgical soap), healthcare directive, postoperative cares, activity restrictions, pain management, and postop appointments. Patient has writer's direct contact information and was encouraged to call with any questions or concerns prior to surgery.

## 2024-01-03 NOTE — NURSING NOTE
Chief Complaint   Patient presents with    New Patient     Chief Complaint   Patient presents with    New Patient   Elvira Craig NRNIRMALT

## 2024-01-03 NOTE — PROGRESS NOTES
[facility]  Department of Neurosurgery    Name: Leigh Sloan  : 2000  Referring provider: Suman Burnett  24     Reason for visit: IIH s/p right occipital  shunt (Codman Hakim set to 130, placed  in [place]), new patient visit    Dear Dr. Burnett and Dr. Brand,    It was a pleasure to see Ms. Sloan in the [facility] today in followup. As you recall, Ms. Sloan is a pleasant 23 year-old right handed female with history of  IIH s/p  shunt in  in [place]. She has not required reprogramming or revision surgery. She is referred for recurrent papilledema after an exam with Dr. Brand. Prior to the placement of her  shunt, she was experiencing dizziness, headaches, blacking out, etc. She has noticed improvement in her vision since the  shunt was put in. She had an LP done in [place] in 3/2020 due to bilateral pulsatile tinnitus and severe headaches, found with an opening pressure of 40 per patient report. Her shunt was placed next day by Dr. Bright. She did well until September of this year when she had worsening headaches and recurrence of her pulsatile tinnitus. She presented to the ED 2023 and was evaluated with LP with opening pressure of 25 cm H2O. At that time head CT showed shunted ventricular system without ventriculomegaly. She also had a shunt series without abnormality. She had post LP headaches and required a blood patch. She then saw Dr. 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 Dr. Brand referred her here in hopes that we could adjust her shunt setting to help with her recurrent papilledema. Two attempts with different Hakim reprogrammers were unsuccessful in achieving a different setting by two members of our team, so I am meeting with her today to discuss options. Today in clinic Leigh reports positional headaches  which are sharp in nature and resolve fairly quickly. She also notes intermittent, short lived blurry vision which occurs only with position changes or if she exerts herself; this symptom resolves within 1-2 minutes of stopping the activity. Her pulsatile tinnitus has persisted. She denies new fever, chills, vomiting, weakness, or falls. Of note, she has a sulfa allergy so diamox was not used in the past. She denies use of blood thinners or aspirin. Her glasses prescription was last checked about 7 months ago. She notes that she had a difficult recovery from the initial shunt placement in [place] in 2020, and she was bedridden for 3 weeks after her shunt placement.    Review of Systems:   Pertinent items are noted in HPI or as in patient entered ROS below, remainder of complete ROS is negative.     Medications:   Symbicort, Zyrtec, Cyclobenzaprine, Cardizem, Drospirenone-ethinyl estradiol, hydroxyzine, Meclizine, Meloxicam, Metoclopramide, Metoprolol succinate ER, Omeprazole, Ondansetron, Prazosin, Quetiapine Fumarate, Sertraline, VENTOLIN HFA      Allergies:   Sulfa antibiotics, Sulfamethoxazole-trimethoprim, and Trimethoprim      Past Medical History:  Acid reflux, Asthma, High cholesterol, Non-alcoholic fatty liver disease, Scoliosis, ADHD, Asperger syndrome, Benign intracranial hypertension, Dermatillomania, Elevated LDH, GERD,  shunt     Past Surgical History:   shunt placement    Family History:   No known family history of nystagmus, strabismus, amblyopia     Social History:   Nonsmoker. Moved to [place] from [place]. Has support at home from parents. Parents live in [place] and she lives with a friend here in [place] now.     Physical Exam:   General: No acute distress.   Head: No signs of trauma.    Eyes: Conjunctivae are normal.  Mouth/Throat: Oropharynx moist.  Neck: Normal range of motion.    Resp: No respiratory distress.   MSK: Moves all extremities.  No obvious deformity.  Neuro: The  patient is fully oriented and quite pleasant. Speech normal. 20/20 bilaterally corrected. Extraocular movements are intact without nystagmus. Facial sensation is intact in V1, V2, V3 distributions. Facial nerve function is normal, rated as a House Brackmann 1, without synkinesis.  Palate is symmetric. Shoulders are full strength. Tongue is midline. There is no pronator drift. Full strength in all extremities. Sensation intact throughout. No dysmetria with finger-nose-finger testing.  Psych: Normal mood and affect. Behavior is normal.      Imaging:  We reviewed the x-ray imaging from 12/13/23 and compared it to previous scans which demonstrates the right parieto-occipital approach ventriculoperitoneal shunt is intact in the visualized portions of the cranium and neck. Similar virginia holes over the parietal bone. The bones of the calvarium appear otherwise normal. Dental hardware. Normal alignment of the visualized portions of the cervical vertebrae.     Assessment:  II s/p right occipital  shunt (Codman Hakim set to 130, placed 2020 in [place])    Plan:  We reviewed the patient's history, imaging, natural history and expected outcomes of conservative management and intervention. Options include medical management with diamox (she is concerned about her sulfa allergy) or exploration of the shunt to test if it is working and to potentially swap out the Hakim valve for a more modern valve such as a Certas system. We would reopen the occipital incision and possibly make new incisions in the scalp, neck, and abdomen to test the various components of the shunt, and replace any occluded pieces. The valve may be swapped for a Certas or Strata system. If the system is obviously infected, the whole system may be removed. It is also typically difficult to tell if it is infected, and this may only be learned later after explanted shutn hardware is cultured and results from the lab in 1-3 weeks. This may necessitate a delayed  second surgery for shunt removal or revision. We reviewed the risks of surgery including bleeding, infection, need for shunt revision/removal, neurologic injury like weakness/numbness/aphasia, stroke, CSF leak, failure to help her vision, need for additional procedures/operations.  We reviewed the perioperative expectations, hospital stay, and recovery period.  She would like to proceed. I will place a case request for a right  shunt exploration/revision/removal.  We will obtain a new CT-head (noncontrast)  I encouraged Ms. Sloan to contact with any questions or concerns or if we may be of assistance in any way.      It was a pleasure to participate in the care of your patient. Please feel free to contact me at any point if I can be of any assistance for Ms. Sloan.    Sincerely,  Calvin Cabrera MD       40 minutes spent on the date of the encounter doing chart review, review of outside records, review of test results, interpretation of tests, patient visit, documentation and discussion with other provider(s)       Scribe Disclosure:   I, ALE CARTY, am serving as a scribe; to document services personally performed by Calvin Cabrera MD -based on data collection and the provider's statements to me.

## 2024-01-03 NOTE — LETTER
1/3/2024       RE: Leigh Sloan  [address]  [facility] [address]     Dear Colleague,    Thank you for referring your patient, Leigh Sloan, to the [facility] [place] [facility]. Please see a copy of my visit note below.    [facility]  Department of Neurosurgery    Name: Leigh Sloan  : 2000  Referring provider: Suman Burnett  24     Reason for visit: IIH s/p right occipital  shunt (Codman Hakim set to 130, placed  in [place]), new patient visit    Dear Dr. Burnett and Dr. Brand,    It was a pleasure to see Ms. Sloan in the [facility] today in followup. As you recall, Ms. Sloan is a pleasant 23 year-old right handed female with history of  IIH s/p  shunt in  in [place]. She has not required reprogramming or revision surgery. She is referred for recurrent papilledema after an exam with Dr. Brand. Prior to the placement of her  shunt, she was experiencing dizziness, headaches, blacking out, etc. She has noticed improvement in her vision since the  shunt was put in. She had an LP done in [place] in 3/2020 due to bilateral pulsatile tinnitus and severe headaches, found with an opening pressure of 40 per patient report. Her shunt was placed next day by Dr. Bright. She did well until September of this year when she had worsening headaches and recurrence of her pulsatile tinnitus. She presented to the ED 2023 and was evaluated with LP with opening pressure of 25 cm H2O. At that time head CT showed shunted ventricular system without ventriculomegaly. She also had a shunt series without abnormality. She had post LP headaches and required a blood patch. She then saw Dr. 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 Dr. Brand referred her  here in hopes that we could adjust her shunt setting to help with her recurrent papilledema. Two attempts with different Hakim reprogrammers were unsuccessful in achieving a different setting by two members of our team, so I am meeting with her today to discuss options. Today in clinic Leigh reports positional headaches which are sharp in nature and resolve fairly quickly. She also notes intermittent, short lived blurry vision which occurs only with position changes or if she exerts herself; this symptom resolves within 1-2 minutes of stopping the activity. Her pulsatile tinnitus has persisted. She denies new fever, chills, vomiting, weakness, or falls. Of note, she has a sulfa allergy so diamox was not used in the past. She denies use of blood thinners or aspirin. Her glasses prescription was last checked about 7 months ago. She notes that she had a difficult recovery from the initial shunt placement in [place] in 2020, and she was bedridden for 3 weeks after her shunt placement.    Review of Systems:   Pertinent items are noted in HPI or as in patient entered ROS below, remainder of complete ROS is negative.     Medications:   Symbicort, Zyrtec, Cyclobenzaprine, Cardizem, Drospirenone-ethinyl estradiol, hydroxyzine, Meclizine, Meloxicam, Metoclopramide, Metoprolol succinate ER, Omeprazole, Ondansetron, Prazosin, Quetiapine Fumarate, Sertraline, VENTOLIN HFA      Allergies:   Sulfa antibiotics, Sulfamethoxazole-trimethoprim, and Trimethoprim      Past Medical History:  Acid reflux, Asthma, High cholesterol, Non-alcoholic fatty liver disease, Scoliosis, ADHD, Asperger syndrome, Benign intracranial hypertension, Dermatillomania, Elevated LDH, GERD,  shunt     Past Surgical History:   shunt placement    Family History:   No known family history of nystagmus, strabismus, amblyopia     Social History:   Nonsmoker. Moved to [place] from [place]. Has support at home from parents. Parents live in [place] and she  lives with a friend here in [place] now.     Physical Exam:   General: No acute distress.   Head: No signs of trauma.    Eyes: Conjunctivae are normal.  Mouth/Throat: Oropharynx moist.  Neck: Normal range of motion.    Resp: No respiratory distress.   MSK: Moves all extremities.  No obvious deformity.  Neuro: The patient is fully oriented and quite pleasant. Speech normal. 20/20 bilaterally corrected. Extraocular movements are intact without nystagmus. Facial sensation is intact in V1, V2, V3 distributions. Facial nerve function is normal, rated as a House Brackmann 1, without synkinesis.  Palate is symmetric. Shoulders are full strength. Tongue is midline. There is no pronator drift. Full strength in all extremities. Sensation intact throughout. No dysmetria with finger-nose-finger testing.  Psych: Normal mood and affect. Behavior is normal.      Imaging:  We reviewed the x-ray imaging from 12/13/23 and compared it to previous scans which demonstrates the right parieto-occipital approach ventriculoperitoneal shunt is intact in the visualized portions of the cranium and neck. Similar virginia holes over the parietal bone. The bones of the calvarium appear otherwise normal. Dental hardware. Normal alignment of the visualized portions of the cervical vertebrae.     Assessment:  IIH s/p right occipital  shunt (Codman Hakim set to 130, placed 2020 in [place])    Plan:  We reviewed the patient's history, imaging, natural history and expected outcomes of conservative management and intervention. Options include medical management with diamox (she is concerned about her sulfa allergy) or exploration of the shunt to test if it is working and to potentially swap out the Hakim valve for a more modern valve such as a Certas system. We would reopen the occipital incision and possibly make new incisions in the scalp, neck, and abdomen to test the various components of the shunt, and replace any occluded pieces. The valve may be  swapped for a Certas or Strata system. If the system is obviously infected, the whole system may be removed. It is also typically difficult to tell if it is infected, and this may only be learned later after explanted shutn hardware is cultured and results from the lab in 1-3 weeks. This may necessitate a delayed second surgery for shunt removal or revision. We reviewed the risks of surgery including bleeding, infection, need for shunt revision/removal, neurologic injury like weakness/numbness/aphasia, stroke, CSF leak, failure to help her vision, need for additional procedures/operations.  We reviewed the perioperative expectations, hospital stay, and recovery period.  She would like to proceed. I will place a case request for a right  shunt exploration/revision/removal.  We will obtain a new CT-head (noncontrast)  I encouraged Ms. Sloan to contact with any questions or concerns or if we may be of assistance in any way.      It was a pleasure to participate in the care of your patient. Please feel free to contact me at any point if I can be of any assistance for Ms. Sloan.      40 minutes spent on the date of the encounter doing chart review, review of outside records, review of test results, interpretation of tests, patient visit, documentation and discussion with other provider(s)       Scribe Disclosure:   I, ALE CARTY, am serving as a scribe; to document services personally performed by Calvin Cabrera MD -based on data collection and the provider's statements to me.       Again, thank you for allowing me to participate in the care of your patient.      Sincerely,    Calvin Cabrera MD

## 2024-01-03 NOTE — PATIENT INSTRUCTIONS
You were seen today with Dr. Calvin Cabrera.    Next steps:  Our surgery coordinator will contact you to schedule surgery.  CT scan today.      How to Contact Us  Send a Incentiv[employer] message to your provider.   Call the clinic - your call will be routed appropriately.   Neurosurgery Clinic: [phone]  To speak directly to an RN Care Coordinator:  Marimar RN: [phone]  Winter RN: [phone]    Note: We do our best to check voicemail frequently throughout the day and make every effort to return calls within 1-2 business days. For urgent matters, please use the general clinic phone numbers listed above.       Surgery Instructions    PREPARING FOR SURGERY  You will need a preoperative assessment within 30 days of your surgery. We will set this up for you once we know your surgery date.  Before surgery, call the clinic:   If there is any change in your health, or you are having more frequent or severe symptoms   If you develop a cold or flu, or if you test positive for COVID-19   If you have any questions about what to expect before, during, or after surgery   If you need assistance filling out paperwork for short-term disability or FMLA, or you need a letter excusing you from work       MEDICATIONS BEFORE SURGERY  You will receive specific instructions about how to take your medications at your preoperative assessment visit. Talk to your care team about every medication that you take, including over-the-counter medications and supplements. Some medications can make you bleed too much during surgery, and some change how well anesthesia drugs work. Follow these general instructions for common medications, unless otherwise directed.     INSTRUCTIONS MEDICATION   Hold for 7 days before surgery  Supplements   Multivitamins   Aspirin (note: some medications can contain aspirin, like [place])  Naproxen (Aleve)  Ibuprofen (Advil, Motrin)     Talk with the [facility] (P[facility]) about how to take these  medications before surgery    Insulin or oral medications for diabetes   Blood pressure medications   Anticoagulant medications, including:    warfarin (Coumadin)   enoxaparin (Lovenox)   dabigatran (Pradaxa)   apixaban (Eliquis)   rivaroxaban (Xarelto)   Antiplatelet medications, including:   clopidogrel bisulfate (Plavix)   cilostazol (Pletal)      Okay to keep taking for pain, as needed    Acetaminophen (Tylenol)        EATING AND DRINKING BEFORE SURGERY  Eat and drink as usual until 8 hours before your surgery arrival time. After that, no food or milk.   Drink clear liquids until 1 hour before your surgery arrival time. Clear liquids are liquids you can see through, like water, Gatorade, and Propel. You may also have black coffee and tea (no cream or milk).   Nothing by mouth within 1 hour of your surgery arrival time. This includes gum, candy, and breath mints.   If your care team tells you take medicine on the morning of surgery, it is okay to take medicine with a sip of water.   Do not drink alcohol for at least 24 hours before surgery. Do not use marijuana for 7 days prior to surgery.      PREVENTING INFECTION  Shower or bathe the night before and morning of your surgery following the instructions on your handout,  Showering Before Surgery.    Don t shave or clip hair near your surgery site. We ll remove the hair if needed.   Having diabetes and/or smoking can cause delayed wound healing and increase your risk of a surgical site infection. Quitting smoking and lowering your blood sugars can make a big difference. If you would like support with this, please let us know or work with your primary care provider.        SMOKING AND NICOTINE  Don t smoke or vape the morning of surgery. You may chew nicotine gum up to 2 hours before surgery. A nicotine patch is okay.   We strongly recommend quitting smoking and other tobacco products. Nicotine can cause delayed healing and wound complications after surgery.  "      PLANNING AHEAD - FINANCES  [email or web address]  [facility] Billing: Please call [phone], if you wish to pay a bill.  [facility] Financial Counselors: Please call [phone], if you have questions about possible costs and coverage or to discuss options if you don't have enough - or no - insurance for your care.  [facility] Cost of Care Estimates: Please visit the website to view our online estimate tool. If you have additional questions, call [phone] for estimates.  Our financial team will contact your insurance company as soon as surgery is scheduled. If your insurance company requires a prior authorization (pre-approval), our financial team will complete these necessary steps. Typically, we don t encounter many issues with insurance denying coverage for skull base surgery.    It is a good idea to call your insurance company and let them know you re having surgery. Ask questions about your deductible, co-insurance, and what of out-of-pocket costs you will be responsible for.   [employer]: You might hear about a company called [employer], with whom we contract to provide monitoring of your nerves during surgery (called \"intraoperative neuromonitoring\"). You may receive a letter from [employer] after your surgery, and this company and letter are legitimate. For questions and more information, please visit: [email or web address]      COVID-19 AND VISITOR RESTRICTIONS  Please visit [email or web address] for the latest information about hospital visitor restrictions.      HEALTHCARE DIRECTIVE  Consider preparing a Health Care Directive and choosing a Health Care Agent. A Health Care Directive is a written plan outlining your values and priorities for your future medical treatment. A Health Care Agent makes health care " decisions based on your wishes if you are unable to communicate.   Download a document, information materials or register for a free class on advance care planning and creating a health care directive by visiting [email or web address], calling [phone], or emailing [email or web address].   If you have a health care directive or choose to complete a healthcare directive before surgery, please upload the document to [employer]. This will be attached to your chart. You may also want to bring a copy with you on the day of surgery.       YOUR SURGERY DAY  Parking:   Both self-park and  parking (24 hours, 7 days a week) are available at the [facility]. Self-park and  rates are the same. If the Patient Visitors Ramp is full or if a patient or visitor has limited mobility, please follow the signs to the  located at the main entrance. For more information, please visit: [email or web address]    Due to safety concerns around COVID-19, [facility] cannot offer  services to all patients at this time. However, we do still offer  services for patients with mobility limitations.   What to bring:  Photo ID and insurance card   Copy of your healthcare directive (if you have one)   Glasses with case (you can t wear contacts during surgery)   Hearing aids with case   A few personal items (pack lightly)   Cell phone and    Inhaler (if you use)   Eye drops (if you use)   If you have a pacemaker, ICD (defibrillator) or other implant, please bring the ID card   If you have an implanted stimulator, please bring the remote control   If you have a legal guardian, bring a copy of the certified (court-stamped) guardianship papers   What to leave at home:  Please remove any jewelry, including body piercings   Leave jewelry and other valuables at home       HOSPITAL STAY  On average, your hospital stay will be between 1-2 days. It could be shorter or longer  depending on your specific procedure, your health, and your recovery. The first 24 hours of your hospital stay will typically be in the Intensive Care Unit (ICU). You will be monitored closely and may have some of the following: Intravenous (IV) fluids and medications, a catheter which drains your urine, or a lumbar drain (a small catheter in your low back).  After the ICU, you will be moved to a regular hospital bed/floor. The rest of your post-operative recovery will focus on your individual needs which may include: helping with balance, nausea, swallowing, bowel management, pain, and incision care.       HOME RECOVERY  Everyone's recovery is different based on their health condition, age, and overall health status.   Plan to have an adult stay with you for at least 24 hours after you get home from the hospital.   Arrange for help at home. It is common to feel tired for the first few weeks (maybe months) and you will need to avoid some activities. Many people find that having someone help with household tasks, such as cleaning, cooking, and running errands is helpful.    Make your home safe by removing tripping hazards and moving items you need to a place where you can easily reach them.        ACTIVITY RESTRICTIONS  Avoid strenuous exercise and activity for 6-12 weeks.   Do not lift anything greater than 10 pounds (about a gallon of milk).  Extra pressure in your head can disrupt the healing of the internal surgical area and cause complications. Avoid the following activities that may increase the pressure in your head:  Avoid bending over with your head below your heart  Avoid heavy lifting or straining  Avoid lifting with your arms above your shoulders  Take care to prevent constipation, as this can lead to straining  Avoid drinking through straws  Avoid blowing your nose  Try to cough and sneeze with your mouth open      SLEEPING  Plan to sleep with your head elevated (at least 30 degrees) for at least 2 weeks  after surgery. You may find that sleeping in a recliner is helpful and more comfortable.       INCISION CARE  Usually, you will have a gauze bandage over your incision after surgery. Follow any instructions that are given to you at the time of discharge from the hospital.  If your surgeon tells you that you can shower, avoid soaking the incision or submerging it under water, and be very gentle (do not scrub the incision).  Check your incision every day. Call the clinic if you notice any of the following:  Increasing redness  Swelling or bogginess (fluid collection around / under the incision)  Drainage  Separation of wound edges  Fever      PAIN MANAGEMENT  It is normal to have some pain after surgery. Most people do not experience severe pain. If you are experiencing severe pain at the incision site or severe headaches, please let us know right away.  You will usually be sent home with a small amount of narcotic pain medication. You should gradually reduce the amount of pain medication that you take as your pain improves.  Avoid ibuprofen (Advil) or naproxen (Aleve) immediately after surgery, unless your surgeon tells you this is okay to take.   It is okay to take acetaminophen (Tylenol) for pain. You can take Tylenol with the narcotic pain medication, and some people find benefit in alternating between the narcotic pain medication and Tylenol. (Example: 1 tablet of oxycodone at 8:00 am, 1 tablet of Tylenol at 10:00 am).      DIET  A well-balanced diet is important for your recovery.   Try to eat plenty of fiber (fruits, whole grains, beans, and vegetables can be good sources of fiber) to help prevent constipation.   It is important to stay hydrated after surgery to prevent dehydration and help with bowel movements. Drink plenty of water throughout the day.      WORK AND FMLA / SHORT TERM DISABILITY  We are happy to complete short term disability or FMLA paperwork for you. Please send a [email or web address] message (you can  attach a document) with the paperwork that you need completed. Please let us know where you'd like for us to send the paperwork. We can send it directly to your leave  or employer, with your permission, or we can send it back to you.  Please allow at least 5-7 business days for paperwork completion and processing.       CALL THE CLINIC IF YOU EXPERIENCE:  Drainage, swelling, fluid collection, or increased redness around the incision   Fever, or temperature of 101 degrees or higher   Stiff neck or extra sensitivity to light   Clear nasal drainage which you did not have before surgery, or a new salty/metallic taste  Increase in facial weakness   Vision changes  Pain not managed by your pain medication   Severe constipation or abdominal pain  Running low on prescription medication that was prescribed to you at the time of surgery   Any other symptoms that you have questions about    After clinic hours or on the weekends, please call the hospital  at [phone] and ask to speak with the Neurosurgery or ENT resident on call. If you have a serious emergency, call 911 or come to the emergency room. If you can, come to the hospital where you had your surgery.     During clinic hours:   Department  Phone    Ear, Nose, & Throat (ENT)    [phone]    Neurosurgery    [phone]     Skull Base RN Care Coordinators:  We do our best to check voicemail frequently throughout the day. For urgent matters, please use the general clinic phone numbers listed above. Your call will be routed appropriately.     Marimar Woods MA, RN, PHN, NB-HWC   RN Care Coordinator & Skull Base    Direct: [phone]

## 2024-01-03 NOTE — LETTER
[facility]  [facility]UROSURG[facility]INIC [address]  3RD FLO[address]  [facility] [address]  Phone: [phone]  Fax: [phone]          1/3/2024    RE:   Leigh Sloan  [address]  [facility] [address]      Dear Colleague,    Thank you for referring your patient, Leigh Sloan, to the [facility]. Please see a copy of my visit note below.      [facility]  Department of Neurosurgery    Name: Leigh Sloan  : 2000  Referring provider: Suman Burnett  24     Reason for visit: IIH s/p right occipital  shunt (Codman Hakim set to 130, placed  in [place]), new patient visit    Dear Dr. Burnett and Dr. Brand,    It was a pleasure to see Ms. Sloan in the [facility] today in followup. As you recall, Ms. Sloan is a pleasant 23 year-old right handed female with history of  IIH s/p  shunt in  in [place]. She has not required reprogramming or revision surgery. She is referred for recurrent papilledema after an exam with Dr. Brand. Prior to the placement of her  shunt, she was experiencing dizziness, headaches, blacking out, etc. She has noticed improvement in her vision since the  shunt was put in. She had an LP done in [place] in 3/2020 due to bilateral pulsatile tinnitus and severe headaches, found with an opening pressure of 40 per patient report. Her shunt was placed next day by Dr. Bright. She did well until September of this year when she had worsening headaches and recurrence of her pulsatile tinnitus. She presented to the ED 2023 and was evaluated with LP with opening pressure of 25 cm H2O. At that time head CT showed shunted ventricular system without ventriculomegaly. She also had a shunt series without abnormality. She had post LP headaches and required a blood  patch. She then saw Dr. 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 Dr. Brand referred her here in hopes that we could adjust her shunt setting to help with her recurrent papilledema. Two attempts with different Hakim reprogrammers were unsuccessful in achieving a different setting by two members of our team, so I am meeting with her today to discuss options. Today in clinic Leigh reports positional headaches which are sharp in nature and resolve fairly quickly. She also notes intermittent, short lived blurry vision which occurs only with position changes or if she exerts herself; this symptom resolves within 1-2 minutes of stopping the activity. Her pulsatile tinnitus has persisted. She denies new fever, chills, vomiting, weakness, or falls. Of note, she has a sulfa allergy so diamox was not used in the past. She denies use of blood thinners or aspirin. Her glasses prescription was last checked about 7 months ago. She notes that she had a difficult recovery from the initial shunt placement in [place] in 2020, and she was bedridden for 3 weeks after her shunt placement.    Review of Systems:   Pertinent items are noted in HPI or as in patient entered ROS below, remainder of complete ROS is negative.     Medications:   Symbicort, Zyrtec, Cyclobenzaprine, Cardizem, Drospirenone-ethinyl estradiol, hydroxyzine, Meclizine, Meloxicam, Metoclopramide, Metoprolol succinate ER, Omeprazole, Ondansetron, Prazosin, Quetiapine Fumarate, Sertraline, VENTOLIN HFA      Allergies:   Sulfa antibiotics, Sulfamethoxazole-trimethoprim, and Trimethoprim      Past Medical History:  Acid reflux, Asthma, High cholesterol, Non-alcoholic fatty liver disease, Scoliosis, ADHD, Asperger syndrome, Benign intracranial hypertension, Dermatillomania, Elevated LDH, GERD,  shunt     Past Surgical History:   shunt placement    Family History:   No known family history of nystagmus,  strabismus, amblyopia     Social History:   Nonsmoker. Moved to [place] from [place]. Has support at home from parents. Parents live in [place] and she lives with a friend here in [place] now.     Physical Exam:   General: No acute distress.   Head: No signs of trauma.    Eyes: Conjunctivae are normal.  Mouth/Throat: Oropharynx moist.  Neck: Normal range of motion.    Resp: No respiratory distress.   MSK: Moves all extremities.  No obvious deformity.  Neuro: The patient is fully oriented and quite pleasant. Speech normal. 20/20 bilaterally corrected. Extraocular movements are intact without nystagmus. Facial sensation is intact in V1, V2, V3 distributions. Facial nerve function is normal, rated as a House Brackmann 1, without synkinesis.  Palate is symmetric. Shoulders are full strength. Tongue is midline. There is no pronator drift. Full strength in all extremities. Sensation intact throughout. No dysmetria with finger-nose-finger testing.  Psych: Normal mood and affect. Behavior is normal.      Imaging:  We reviewed the x-ray imaging from 12/13/23 and compared it to previous scans which demonstrates the right parieto-occipital approach ventriculoperitoneal shunt is intact in the visualized portions of the cranium and neck. Similar [place] holes over the parietal bone. The bones of the calvarium appear otherwise normal. Dental hardware. Normal alignment of the visualized portions of the cervical vertebrae.     Assessment:  IIH s/p right occipital  shunt (Codman Hakim set to 130, placed 2020 in [place])    Plan:  We reviewed the patient's history, imaging, natural history and expected outcomes of conservative management and intervention. Options include medical management with diamox (she is concerned about her sulfa allergy) or exploration of the shunt to test if it is working and to potentially swap out the Hakim valve for a more modern valve such as a Certas system. We would reopen the occipital incision  and possibly make new incisions in the scalp, neck, and abdomen to test the various components of the shunt, and replace any occluded pieces. The valve may be swapped for a Certas or Strata system. If the system is obviously infected, the whole system may be removed. It is also typically difficult to tell if it is infected, and this may only be learned later after explanted shutn hardware is cultured and results from the lab in 1-3 weeks. This may necessitate a delayed second surgery for shunt removal or revision. We reviewed the risks of surgery including bleeding, infection, need for shunt revision/removal, neurologic injury like weakness/numbness/aphasia, stroke, CSF leak, failure to help her vision, need for additional procedures/operations.  We reviewed the perioperative expectations, hospital stay, and recovery period.  She would like to proceed. I will place a case request for a right  shunt exploration/revision/removal.  We will obtain a new CT-head (noncontrast)  I encouraged Ms. Sloan to contact with any questions or concerns or if we may be of assistance in any way.      It was a pleasure to participate in the care of your patient. Please feel free to contact me at any point if I can be of any assistance for Ms. Sloan.    Sincerely,  Calvin Cabrera MD       40 minutes spent on the date of the encounter doing chart review, review of outside records, review of test results, interpretation of tests, patient visit, documentation and discussion with other provider(s)       Scribe Disclosure:   I, ALE CARTY, am serving as a scribe; to document services personally performed by Calvin Cabrera MD -based on data collection and the provider's statements to me.       Again, thank you for allowing me to participate in the care of your patient.      Sincerely,    Calvin Cabrera MD

## 2024-01-04 ENCOUNTER — TELEPHONE (OUTPATIENT)
Dept: NEUROSURGERY | Facility: CLINIC | Age: 24
End: 2024-01-04
Payer: COMMERCIAL

## 2024-01-04 NOTE — TELEPHONE ENCOUNTER
FUTURE VISIT INFORMATION      SURGERY INFORMATION:  Date: 24  Location: uu or  Surgeon:  Calvin Cabrera MD McEachron, Kendall Rose, MD   Anesthesia Type:  General  Procedure: Stealth assisted ventriculoperitoneal shunt revision possible explantation for pseudotumor cerebrii Laparoscopic Assisted Implant Ventriculoperitoneal Shunt   Consult: ov 1/3/24    RECORDS REQUESTED FROM:       [facility]    Pertinent Medical History: tachycardia    [facility]

## 2024-01-04 NOTE — TELEPHONE ENCOUNTER
Scheduled surgery with Dr. Cabrera/Dr Cottrell on 1/22    Spoke with: Patient    Surgery is located at [facility]    Patient will be seen for their H&P by:    PAC on 1/10 at 4pm - Provided address & floor number. Patient is aware that they will receive their start time for surgery at this appointment    Anesthesia type: General      Requested Imaging required for surgery: CT AM of surgery - will call imaging once surgery is scheduled    Patient is scheduled for their 2 week post op on 2/7 at 1230pm with Dolly Garcia    Patient received their packet in clinic, got verbal confirmation    Patient is aware they need a  to & from surgery    Additional comments: Patient will call back if they have any questions or concerns.    Lisa Strong on 1/4/2024 at 1:33 PM

## 2024-01-09 LAB
ABO/RH(D): NORMAL
ANTIBODY SCREEN: NEGATIVE
SPECIMEN EXPIRATION DATE: NORMAL

## 2024-01-10 ENCOUNTER — LAB (OUTPATIENT)
Dept: LAB | Facility: CLINIC | Age: 24
End: 2024-01-10
Payer: COMMERCIAL

## 2024-01-10 ENCOUNTER — ANESTHESIA EVENT (OUTPATIENT)
Dept: SURGERY | Facility: CLINIC | Age: 24
End: 2024-01-10
Payer: COMMERCIAL

## 2024-01-10 ENCOUNTER — PRE VISIT (OUTPATIENT)
Dept: SURGERY | Facility: CLINIC | Age: 24
End: 2024-01-10

## 2024-01-10 ENCOUNTER — OFFICE VISIT (OUTPATIENT)
Dept: SURGERY | Facility: CLINIC | Age: 24
End: 2024-01-10
Payer: COMMERCIAL

## 2024-01-10 VITALS
HEART RATE: 93 BPM | DIASTOLIC BLOOD PRESSURE: 81 MMHG | TEMPERATURE: 98.9 F | WEIGHT: 264.2 LBS | SYSTOLIC BLOOD PRESSURE: 135 MMHG | OXYGEN SATURATION: 98 % | RESPIRATION RATE: 16 BRPM | HEIGHT: 65 IN | BODY MASS INDEX: 44.02 KG/M2

## 2024-01-10 DIAGNOSIS — G93.2 IIH (IDIOPATHIC INTRACRANIAL HYPERTENSION): ICD-10-CM

## 2024-01-10 DIAGNOSIS — Z01.818 PREOP EXAMINATION: ICD-10-CM

## 2024-01-10 DIAGNOSIS — D72.828 OTHER ELEVATED WHITE BLOOD CELL (WBC) COUNT: Primary | ICD-10-CM

## 2024-01-10 LAB
ANION GAP SERPL CALCULATED.3IONS-SCNC: 14 MMOL/L (ref 7–15)
BUN SERPL-MCNC: 17.5 MG/DL (ref 6–20)
CALCIUM SERPL-MCNC: 9.2 MG/DL (ref 8.6–10)
CHLORIDE SERPL-SCNC: 102 MMOL/L (ref 98–107)
CREAT SERPL-MCNC: 0.72 MG/DL (ref 0.51–0.95)
DEPRECATED HCO3 PLAS-SCNC: 20 MMOL/L (ref 22–29)
EGFRCR SERPLBLD CKD-EPI 2021: >90 ML/MIN/1.73M2
ERYTHROCYTE [DISTWIDTH] IN BLOOD BY AUTOMATED COUNT: 13.1 % (ref 10–15)
GLUCOSE SERPL-MCNC: 92 MG/DL (ref 70–99)
HCT VFR BLD AUTO: 38.1 % (ref 35–47)
HGB BLD-MCNC: 13 G/DL (ref 11.7–15.7)
MCH RBC QN AUTO: 27.1 PG (ref 26.5–33)
MCHC RBC AUTO-ENTMCNC: 34.1 G/DL (ref 31.5–36.5)
MCV RBC AUTO: 80 FL (ref 78–100)
PLATELET # BLD AUTO: 438 10E3/UL (ref 150–450)
POTASSIUM SERPL-SCNC: 4.1 MMOL/L (ref 3.4–5.3)
RBC # BLD AUTO: 4.79 10E6/UL (ref 3.8–5.2)
SODIUM SERPL-SCNC: 136 MMOL/L (ref 135–145)
WBC # BLD AUTO: 14.5 10E3/UL (ref 4–11)

## 2024-01-10 PROCEDURE — 99204 OFFICE O/P NEW MOD 45 MIN: CPT | Performed by: CLINICAL NURSE SPECIALIST

## 2024-01-10 PROCEDURE — 36415 COLL VENOUS BLD VENIPUNCTURE: CPT | Performed by: PATHOLOGY

## 2024-01-10 PROCEDURE — 85027 COMPLETE CBC AUTOMATED: CPT | Performed by: PATHOLOGY

## 2024-01-10 PROCEDURE — 80048 BASIC METABOLIC PNL TOTAL CA: CPT | Performed by: PATHOLOGY

## 2024-01-10 PROCEDURE — 86900 BLOOD TYPING SEROLOGIC ABO: CPT | Performed by: CLINICAL NURSE SPECIALIST

## 2024-01-10 RX ORDER — DOXYCYCLINE HYCLATE 100 MG
100 TABLET ORAL 2 TIMES DAILY
COMMUNITY
Start: 2024-01-05 | End: 2024-01-12

## 2024-01-10 ASSESSMENT — ENCOUNTER SYMPTOMS
SEIZURES: 0
DYSRHYTHMIAS: 1

## 2024-01-10 ASSESSMENT — LIFESTYLE VARIABLES: TOBACCO_USE: 0

## 2024-01-10 ASSESSMENT — PAIN SCALES - GENERAL: PAINLEVEL: MODERATE PAIN (5)

## 2024-01-10 NOTE — H&P
Pre-Operative H & P     CC:  Preoperative exam to assess for increased cardiopulmonary risk while undergoing surgery and anesthesia.    Date of Encounter: 1/10/2024  Primary Care Physician:  Saravanan Patterson     Reason for visit:   Encounter Diagnoses   Name Primary?    Preop examination Yes    IIH (idiopathic intracranial hypertension)        HPI  Leigh Sloan is a 23 year old female who presents for pre-operative H & P in preparation for  Procedure Information       Case: [id number] Date/Time: 01/22/24 0730    Procedures:       Stealth assisted ventriculoperitoneal shunt revision possible explantation for pseudotumor cerebrii (Right: Head)      Laparoscopic Assisted Implant Ventriculoperitoneal Shunt (Abdomen)    Anesthesia type: General    Diagnosis: IIH (idiopathic intracranial hypertension) [G93.2]    Pre-op diagnosis: IIH (idiopathic intracranial hypertension) [G93.2]    Location:  OR [address] [place] [address] OR    Providers: Calvin Cabrera MD; Scottie Cottrell MD          History is obtained from the patient and chart review    Patient who was recently referred to Dr. Cabrera and team for history of IIH s/p  shunt in 2020 in [place]. She has not required reprogramming or revision surgery, but she is having recurrent papilledema confirmed on exam by Dr. Brand. She has noticed improvement in her vision since the  shunt was put in. She has done well until September 2023, when she began to have worsening and recurrence of her pulsatile tinnitus. Per notes, presented to the ED 9/12/2023 and was evaluated with LP with opening pressure of 25 cm H2O. At that time, a head CT showed shunted ventricular system without ventriculomegaly. She also had a shunt series without abnormality. She had post LP headaches and required a blood patch. She then saw Dr. Brand in clinic 10/31/2023, for an initial consult where bilateral optic disc edema was found on exam. Dr. Brand referred her then in hopes that her shunt  setting could be adjusted to help with her recurrent papilledema. Two attempts with different Hakim reprogrammers were unsuccessful in achieving a different setting by two members of our team. Surgical options were discussed and she was counseled for above procedure.    Her history is otherwise significant for obesity, high cholesterol, asthma, GERD, NAFLD, dermatillomania, elevated LDH, scoliosis, ADHD, anxiety, depression and PTSD.     She was seen by Urgent Care on 1/5/24 after 1 week of URI symptoms with asthma exacerbation. She was COVID neg. She is completing treatment with doxycycline and medrol dosepak. She continues to use Symbicort. Her symptoms are resolving.     Hx of abnormal bleeding or anti-platelet use: Denies     Menstrual history: Patient's last menstrual period was 12/10/2023 (within days).     Past Medical History  Past Medical History:   Diagnosis Date    Acid reflux     Anxiety     Asthma     Depression     High cholesterol     IIH (idiopathic intracranial hypertension)     Non-alcoholic fatty liver disease     PCOS (polycystic ovarian syndrome)     PTSD (post-traumatic stress disorder)     Scoliosis     Tachycardia        Past Surgical History  Past Surgical History:   Procedure Laterality Date    CV HEART CATHETERIZATION WITH POSSIBLE INTERVENTION      IMPLANT SHUNT VENTRICULOPERITONEAL         Prior to Admission Medications  Current Outpatient Medications   Medication Sig Dispense Refill    budesonide-formoterol (SYMBICORT) 80-4.5 MCG/ACT Inhaler Inhale 2 puffs into the lungs as needed      cetirizine (ZYRTEC) 10 MG tablet Take 10 mg by mouth as needed      cyclobenzaprine (FLEXERIL) 10 MG tablet Take 10 mg by mouth 3 times daily as needed      doxycycline hyclate (VIBRA-TABS) 100 MG tablet Take 100 mg by mouth 2 times daily      drospirenone-ethinyl estradiol (SAMMY) 3-0.02 MG tablet Take 1 tablet by mouth at bedtime      hydrOXYzine (VISTARIL) 25 MG capsule Take 25 mg by mouth as needed       meclizine (ANTIVERT) 25 MG tablet Take 12.5-25 mg by mouth 3 times daily as needed      meloxicam (MOBIC) 15 MG tablet Take 15 mg by mouth at bedtime      metoclopramide (REGLAN) 10 MG tablet Take 10 mg by mouth as needed      metoprolol succinate ER (TOPROL XL) 50 MG 24 hr tablet Take 1 tablet by mouth at bedtime      omeprazole (PRILOSEC) 20 MG DR capsule Take 20 mg by mouth at bedtime      ondansetron (ZOFRAN ODT) 4 MG ODT tab Dissolve 1 tablet in mouth every eight hours as needed for nausea or vomiting for up to 7 days.      ondansetron (ZOFRAN) 4 MG tablet Take 4 mg by mouth every 6 hours as needed      prazosin (MINIPRESS) 2 MG capsule Take 1 capsule by mouth at bedtime      QUEtiapine Fumarate 150 MG TABS Take 1 tablet by mouth at bedtime      sertraline (ZOLOFT) 25 MG tablet Take 75 mg by mouth at bedtime         Allergies  Allergies   Allergen Reactions    Sulfa Antibiotics Unknown    Sulfamethoxazole-Trimethoprim Nausea and Nausea and Vomiting    Trimethoprim Rash       Social History  Social History     Socioeconomic History    Marital status: Single     Spouse name: Not on file    Number of children: Not on file    Years of education: Not on file    Highest education level: Not on file   Occupational History    Not on file   Tobacco Use    Smoking status: Never    Smokeless tobacco: Never   Substance and Sexual Activity    Alcohol use: Not Currently    Drug use: Yes     Types: Marijuana     Comment: Gummies on occasion    Sexual activity: Not on file   Other Topics Concern    Not on file   Social History Narrative    Not on file     Social Determinants of Health     Financial Resource Strain: Not on file   Food Insecurity: Not on file   Transportation Needs: Not on file   Physical Activity: Not on file   Stress: Not on file   Social Connections: Not on file   Interpersonal Safety: Not on file   Housing Stability: Not on file       Family History  Family History   Problem Relation Age of Onset    Anxiety  Disorder Mother     Ataxia Mother     Depression Mother     Diabetes Mother     Fibromyalgia Mother     Hypertension Mother     Anxiety Disorder Father     Depression Father     Diabetes Father     Anxiety Disorder Sister     Bipolar Disorder Sister     Fibromyalgia Sister     Breast Cancer Paternal Grandmother     Skin Cancer Paternal Grandmother     Colon Cancer Paternal Grandfather     Nystagmus No family hx of     Strabismus No family hx of     Amblyopia No family hx of     Glasses (<7 y/o) No family hx of        Review of Systems  The complete review of systems is negative other than noted in the HPI or here.   Anesthesia Evaluation   Pt has had prior anesthetic. Type: General and MAC.    History of anesthetic complications  - motion sickness and PONV.      ROS/MED HX  ENT/Pulmonary:     (+)     EDDA risk factors,   obese,  observed stopped breathing,           Mild Persistent, asthma Last exacerbation: 12/29/2023,  Treatment: Oral steroids and Inhaled steroids,       recent URI,  resolving, completed antibiotics and steroid dosing:     (-) tobacco use   Neurologic: Comment: IIH s/p  shunt  Recurrent papilledema    (+)      migraines,                       (-) no seizures and no CVA   Cardiovascular: Comment: Tachycardia with negative work up     (+) Dyslipidemia - -   -  - -                        dysrhythmias, Other,        Previous cardiac testing  (-) taking anticoagulants/antiplatelets and URRUTIA   METS/Exercise Tolerance: 3 - Able to walk 1-2 blocks without stopping    Hematologic:    (-) history of blood clots and history of blood transfusion   Musculoskeletal: Comment: Reports possible fibromyalgia, scoliosis, and inflammation       GI/Hepatic: Comment: Fatty liver    (+) GERD, Asymptomatic on medication,           liver disease,       Renal/Genitourinary:  - neg Renal ROS     Endo: Comment: PCOS    (+)               Obesity,    (-) chronic steroid usage   Psychiatric/Substance Use:     (+) psychiatric  "history anxiety, depression and other (comment) (PTSD)   Recreational drug usage: Cannabis (Occ edibles).    Infectious Disease:  - neg infectious disease ROS     Malignancy:  - neg malignancy ROS     Other:  - neg other ROS    (+)  , H/O Chronic Pain,         /81 (BP Location: Right arm, Patient Position: Sitting, Cuff Size: Adult Large)   Pulse 93   Temp 98.9  F (37.2  C) (Oral)   Resp 16   Ht 1.651 m (5' 5\")   Wt 119.8 kg (264 lb 3.2 oz)   LMP 12/10/2023 (Within Days)   SpO2 98%   Breastfeeding No   BMI 43.97 kg/m      Physical Exam   Constitutional: Awake, alert, cooperative, no apparent distress, and appears stated age. Accompanied by aunt.   Eyes: No light to pupils due to light sensitivity. Sclera clear, conjunctiva normal.  HENT: Normocephalic, oral pharynx with moist mucus membranes, good dentition. No goiter appreciated.   Respiratory: Lung initially sounded coarse throughout. No wheezing. Asked patient to take a deep breath and cough. Lungs then were clear; no crackles or wheezing. No persistent cough or obvious dyspnea.  Cardiovascular: Regular rate and rhythm, normal S1 and S2, and no murmur noted. Carotids +2, no bruits. No edema. Palpable pulses to radial  DP and PT arteries.   GI: Normal bowel sounds, soft, non-distended, no masses palpated. Small surgical scar at right upper abdomen from previous surgery. Patient is reporting sharp pain in this area intermittently. Abdomen is tender to palpation around the scar.   Lymph/Hematologic: No cervical lymphadenopathy and no supraclavicular lymphadenopathy.  Genitourinary:  Deferred.   Skin: Warm and dry.    Musculoskeletal: Full ROM of neck. There is no redness, warmth, or swelling of the joints. Gross motor strength is normal.    Neurologic: Awake, alert, oriented to name, place and time. Cranial nerves II-XII are grossly intact. Gait is normal.   Neuropsychiatric: Calm, cooperative. Normal affect.     Prior Labs/Diagnostic Studies   All " labs and imaging personally reviewed     EK22 Sinus tachycardia, 112 bpm    Echocardiogram: 2019  Summary:  1. Left ventricular ejection fraction, by visual estimation, is 60 to 65%.  2. There is no evidence of pericardial effusion.    1/10/24 CT Head                                                    Impression:   1.  No acute intracranial pathology.  2.  Stable right parietal approach ventriculoperitoneal shunt  terminating in the left lateral ventricle. No hydrocephalus.     The patient's records and results personally reviewed by this provider.     Outside records reviewed from: Care Everywhere    LAB/DIAGNOSTIC STUDIES TODAY:   Lab Results   Component Value Date    WBC 14.5 01/10/2024     Lab Results   Component Value Date    RBC 4.79 01/10/2024     Lab Results   Component Value Date    HGB 13.0 01/10/2024     Lab Results   Component Value Date    HCT 38.1 01/10/2024     Lab Results   Component Value Date    MCV 80 01/10/2024     Lab Results   Component Value Date    MCH 27.1 01/10/2024     Lab Results   Component Value Date    MCHC 34.1 01/10/2024     Lab Results   Component Value Date    RDW 13.1 01/10/2024     Lab Results   Component Value Date     01/10/2024     Last Comprehensive Metabolic Panel:  Lab Results   Component Value Date     01/10/2024    POTASSIUM 4.1 01/10/2024    CHLORIDE 102 01/10/2024    CO2 20 (L) 01/10/2024    ANIONGAP 14 01/10/2024    GLC 92 01/10/2024    BUN 17.5 01/10/2024    CR 0.72 01/10/2024    GFRESTIMATED >90 01/10/2024    KEV 9.2 01/10/2024     Type and screen     Assessment    Leigh Sloan is a 23 year old female seen as a PAC referral for risk assessment and optimization for anesthesia.    Plan/Recommendations  Pt will be optimized for the proposed procedure.  See below for details on the assessment, risk, and preoperative recommendations    NEUROLOGY  - No history of TIA, CVA or seizure  - IIH s/p  shunt .  - Recurrent papilledema  - Denies  headache pain but does see flashing lights. Reglan prn   - Chart history Asperger syndrome  - ADHD. No meds at ths time   -Post Op delirium risk factors:  High co-morbid index    ENT  - No current airway concerns.  Will need to be reassessed day of surgery.  Mallampati: I  TM: > 3    Light sensitivity    CARDIAC  Cardiac evaluation on 11/29/22 for chronic sinus tachycardia and palpitations. No specific findings. Has been controlled with metoprolol at HS. Denies chest pain, irregular HR or DYSPNEA ON EXERTION. Able to walk some distance and climb stairs without exertional symptoms.   - METS (Metabolic Equivalents)~4    RCRI: 0.4% risk of serious cardiac events    PULMONARY  Zyrtec prn but will hold on DOS  Mild persistent asthma with recent URI, resolving. Was seen by Urgent Care 1/5/24 after one week of symptoms. Treated with doxycycline and steroid dosing, just completing course. Denies fever, cough and shortness of breath. Lungs with some coarseness throughout, but cleared with coughing. Is taking Symbicort irregularly. Have asked her to take it daily. Was provided IS with instructions for use leading up to surgery.     Patient has been referred for sleep study. Has an appt in the future.   - Tobacco History    History   Smoking Status    Never   Smokeless Tobacco    Never       GI: GERD. Omeprazole at HS.   Patient reports intermittent right upper abdominal pain around site of previous surgery. She reports that she has had this pain since her surgery, but that it comes and goes. Today she is having intermittent sharp pains, and abdomen is tender to palpation around this scar. No associated symptoms. Denies N/V. She has never reported this and was encouraged to report to her primary provider.     PONV Medium Risk  Total Score: 2           1 AN PONV: Pt is Female    1 AN PONV: Patient is not a current smoker      PONV. Significant history. Final decisions regarding prophylaxis by Anesthesia on DOS. Motion sickness.  "Antivert prn     /RENAL  - Baseline Creatinine  0.72    ENDOCRINE    - BMI: Estimated body mass index is 43.97 kg/m  as calculated from the following:    Height as of this encounter: 1.651 m (5' 5\").    Weight as of this encounter: 119.8 kg (264 lb 3.2 oz).  Class 3 Obesity (BMI > 40)  - No history of Diabetes Mellitus. Last A1C 5.0  - PCOS    HEME  VTE Low Risk 0.26%            Total Score: 0      Denies personal or family history of blood clots  Denies history of blood transfusion   Hgb today: 13  Type and screen drawn today    MSK: Scoliosis. Patient reports chronic pain due to scoliosis, possible fibromyalgia, and some inflammatory condition that has not been fully determined. Flexeril prn THREE TIMES DAILY. Will hold meloxicam for 10 days prior to surgery.     PSYCH  - Anxiety/depression/PTSD. Anxiety when presenting for procedures.     Hydroxyzine prn   Sertraline, Quetiapine, and prazosin at HS.      Different anesthesia methods/types have been discussed with the patient, but they are aware that the final plan will be decided by the assigned anesthesia provider on the date of service.  Patient was discussed with Dr oSrenson. Update to Dr. Cabrera. Discussed elevated WBC 14.5 and recent treatment for URI with steroid.     Agreed on UA/UC    ADDENDUM: 1/12/24  UA RESULTS:  Recent Labs   Lab Test 01/12/24  1339   COLOR Straw   APPEARANCE Clear   URINEGLC Negative   URINEBILI Negative   URINEKETONE Negative   SG 1.018   UBLD Negative   URINEPH 6.0   PROTEIN Negative   NITRITE Negative   LEUKEST Negative   RBCU <1   WBCU <1     Culture returned     50,000-100,000 CFU/mL Mixture of Urogenital Brie        Patient and Dr. Cabrera notified    The patient is optimized for their procedure. AVS with information on surgery time/arrival time, meds and NPO status given by nursing staff. No further diagnostic testing indicated.      On the day of service:     Prep time: 14 minutes  Visit time: 16 minutes  Documentation " time: 15 minutes  ------------------------------------------  Total time: 46 minutes      NOHEMI Tello CNS  Preoperative Assessment Center  [facility]  Phone: [phone]  Fax: [phone]

## 2024-01-10 NOTE — PATIENT INSTRUCTIONS
Preparing for Your Surgery      Name:  Leigh Sloan   MRN:  [id number]   :  2000   Today's Date:  1/10/2024       Arriving for surgery:  Surgery date:  24  Arrival time:  5:30 am  Surgery time: 7:30 am    Please come to:     Please come to:      [facility] [address]  [address] [place]  [address]      The [facility] Patient /Visitor Ramp is located at [address]. Patients and visitors who self-park will receive the reduced hospital parking rate. If the Patient /Visitor Ramp is full, please follow the signs to the  parking located at the main hospital entrance.     parking is available ( 24 hours/ 7 days a week)    Discounted parking pass options are available for patients and visitors. They can be purchased at the [employer] desk at the main hospital entrance.    -    Stop at the security desk and they will direct surgery patients to the 3rd floor Surgery Waiting Room. [phone]     -  If you are in need of directions, wheelchair or escort please stop at the Information/security desk in the lobby.       What can I eat or drink?  -  You may eat and drink normally up to 8 hours prior to arrival time. (Until 9:30 pm on 24)  -  You may have clear liquids until 2 hours prior to arrival time. (Until 3:30 am on 24)    Examples of clear liquids:  Water  Clear broth  Juices (apple, white grape, white cranberry  and cider) without pulp  Noncarbonated, powder based beverages  (lemonade and Chaparro-Aid)  Sodas (Sprite, 7-Up, ginger ale and seltzer)  Coffee or tea (without milk or cream)  Gatorade    -  No Alcohol or cannabis products for at least 24 hours before surgery.     Which medicines can I take?    Hold Aspirin for 7 days before surgery.   Hold Multivitamins for 7 days before surgery.  Hold Supplements for 7 days before surgery.  Hold Ibuprofen (Advil, Motrin) for 1 day(s) before  surgery--unless otherwise directed by surgeon.  Hold Naproxen (Aleve) for 4 days before surgery.    -  DO NOT take these medications the day of surgery:  Cetirizine (Zyrtec)  Hydroxyzine (Vistaril)  Meloxicam (Mobic) - stop 10 days before surgery    -  PLEASE TAKE these medications per your usual routine:  Symbicort inhaler if needed and bring this day of surgery  Flexeril if needed  Vibra-Tabs  Lavinia  Meclizine if needed  Reglan if needed  Metoprolol  Omeprazole (Prilosec)  Zofran if needed  Prazosin (Minipress)  Quetiapine (Seroquel)  Sertraline (Zoloft)    Enhanced Recovery After Surgery - start this today     This is a team effort, including you, to get you back on your feet, eating and drinking      normally and out of the hospital as quickly as possible.  The goals are: 1) NO INFECTIONS and   2) RETURN TO NORMAL DIET    How can we achieve these goals?  1) STAY ACTIVE: Walk every day before your surgery; try to increase the amount every day.  Walk after surgery as much as you can-the nurses will help you.  Walking speeds healing and gets you home quicker, you heal better at home and have less risk of infection.     2) INCENTIVE SPIROMETER: Practice your incentive spirometer 4 times per day with 5 repetitions each time.  Using the incentive spirometer can strengthen your muscles between your ribs and help you have a strong cough after surgery.  A more effective cough can help prevent problems with your lungs.      How do I prepare myself?  - Please take 2 showers (one the night prior to surgery and one the morning of surgery) using Scrubcare or Hibiclens soap.    Use this soap only from the neck to your toes.     Leave the soap on your skin for one minute--then rinse thoroughly.      You may use your own shampoo and conditioner. No other hair products.   - Please remove all jewelry and body piercings.  - No lotions, deodorants or fragrance.  - No makeup or fingernail polish.   - Bring your ID and insurance  card.    -If you use a CPAP machine, please bring the CPAP machine, tubing, and mask to hospital.    -If you have a Deep Brain Stimulator, Spinal Cord Stimulator, or any Neuro Stimulator device---you must bring the remote control to the hospital.      ALL PATIENTS GOING HOME THE SAME DAY OF SURGERY ARE REQUIRED TO HAVE A RESPONSIBLE ADULT TO DRIVE AND BE IN ATTENDANCE WITH THEM FOR 24 HOURS FOLLOWING SURGERY.    Covid testing policy as of 12/06/2022  Your surgeon will notify and schedule you for a COVID test if one is needed before surgery--please direct any questions or COVID symptoms to your surgeon      Questions or Concerns:    - For any questions regarding the day of surgery or your hospital stay, please contact the Pre Admission Nursing Office at [phone].       - If you have health changes between today and your surgery, please call your surgeon.       - For questions after surgery, please call your surgeons office.           Current Visitor Guidelines    You may have 2 visitors in the pre op area.    Visiting hours: 8 a.m. to 8:30 p.m.    Patients confirmed or suspected to have symptoms of COVID 19 or flu:     No visitors allowed for adult patients.   Children (under age 18) can have 1 named visitor.     People who are sick or showing symptoms of COVID 19 or flu:    Are not allowed to visit patients--we can only make exceptions in special situations.       Please follow these guidelines for your visit:          Please maintain social distance          Masking is optional--however at times you may be asked to wear a mask for the safety of yourself and others     Clean your hands with alcohol hand . Do this when you arrive at and leave the building and patient room,    And again after you touch your mask or anything in the room.     Go directly to and from the room you are visiting.     Stay in the patient s room during your visit. Limit going to other places in the hospital as much as  possible     Leave bags and jackets at home or in the car.     For everyone s health, please don t come and go during your visit. That includes for smoking   during your visit.

## 2024-01-11 ENCOUNTER — TELEPHONE (OUTPATIENT)
Dept: SURGERY | Facility: CLINIC | Age: 24
End: 2024-01-11
Payer: COMMERCIAL

## 2024-01-11 NOTE — TELEPHONE ENCOUNTER
"Updated Leigh of her lab results and plan of care.  Per Luz Elena Ryan, CNS: \"her white blood count was elevated. It could be related to recent infection or steroids, but that I talked with Dr. Cabrera, and he thinks we should get a urinalysis.\"  Lab appointment scheduled for 1/12 at 1:15 pm at the [facility].  Leigh expressed understanding.  Ese Sanchez RN  "

## 2024-01-12 ENCOUNTER — LAB (OUTPATIENT)
Dept: LAB | Facility: CLINIC | Age: 24
End: 2024-01-12
Payer: COMMERCIAL

## 2024-01-12 DIAGNOSIS — D72.828 OTHER ELEVATED WHITE BLOOD CELL (WBC) COUNT: ICD-10-CM

## 2024-01-12 DIAGNOSIS — Z01.818 PREOP EXAMINATION: ICD-10-CM

## 2024-01-12 LAB
ALBUMIN UR-MCNC: NEGATIVE MG/DL
APPEARANCE UR: CLEAR
BILIRUB UR QL STRIP: NEGATIVE
COLOR UR AUTO: ABNORMAL
GLUCOSE UR STRIP-MCNC: NEGATIVE MG/DL
HGB UR QL STRIP: NEGATIVE
KETONES UR STRIP-MCNC: NEGATIVE MG/DL
LEUKOCYTE ESTERASE UR QL STRIP: NEGATIVE
MUCOUS THREADS #/AREA URNS LPF: PRESENT /LPF
NITRATE UR QL: NEGATIVE
PH UR STRIP: 6 [PH] (ref 5–7)
RBC URINE: <1 /HPF
SP GR UR STRIP: 1.02 (ref 1–1.03)
SQUAMOUS EPITHELIAL: 1 /HPF
UROBILINOGEN UR STRIP-MCNC: NORMAL MG/DL
WBC URINE: <1 /HPF

## 2024-01-12 PROCEDURE — 81001 URINALYSIS AUTO W/SCOPE: CPT | Performed by: PATHOLOGY

## 2024-01-12 PROCEDURE — 99000 SPECIMEN HANDLING OFFICE-LAB: CPT | Performed by: PATHOLOGY

## 2024-01-12 PROCEDURE — 87086 URINE CULTURE/COLONY COUNT: CPT | Performed by: CLINICAL NURSE SPECIALIST

## 2024-01-13 LAB — BACTERIA UR CULT: NORMAL

## 2024-01-15 ENCOUNTER — TELEPHONE (OUTPATIENT)
Dept: INFUSION THERAPY | Facility: CLINIC | Age: 24
End: 2024-01-15
Payer: COMMERCIAL

## 2024-01-15 RX ORDER — LIDOCAINE 40 MG/G
CREAM TOPICAL
Status: CANCELLED | OUTPATIENT
Start: 2024-01-15

## 2024-01-15 RX ORDER — SODIUM CHLORIDE, SODIUM LACTATE, POTASSIUM CHLORIDE, CALCIUM CHLORIDE 600; 310; 30; 20 MG/100ML; MG/100ML; MG/100ML; MG/100ML
INJECTION, SOLUTION INTRAVENOUS CONTINUOUS
Status: CANCELLED | OUTPATIENT
Start: 2024-01-15

## 2024-01-15 NOTE — NURSING NOTE
"Updated Leigh that, per Luz Elena Ryan, CNS: \"her UA and culture were okay. No signs of infection.\"  Leigh expressed understanding.  Ese Sanchez RN  "

## 2024-01-21 ASSESSMENT — LIFESTYLE VARIABLES: TOBACCO_USE: 0

## 2024-01-21 ASSESSMENT — ENCOUNTER SYMPTOMS
DYSRHYTHMIAS: 1
SEIZURES: 0

## 2024-01-21 NOTE — ANESTHESIA PREPROCEDURE EVALUATION
Anesthesia Pre-Procedure Evaluation    Patient: Leigh Sloan   MRN: [id number] : 2000        Procedure : Procedure(s):  Stealth assisted ventriculoperitoneal shunt revision possible explantation for pseudotumor cerebrii  Laparoscopic Assisted Implant Ventriculoperitoneal Shunt          Past Medical History:   Diagnosis Date    Acid reflux     Anxiety     Asthma     Depression     High cholesterol     IIH (idiopathic intracranial hypertension)     Non-alcoholic fatty liver disease     PCOS (polycystic ovarian syndrome)     PTSD (post-traumatic stress disorder)     Scoliosis     Tachycardia       Past Surgical History:   Procedure Laterality Date    CV HEART CATHETERIZATION WITH POSSIBLE INTERVENTION      IMPLANT SHUNT VENTRICULOPERITONEAL        Allergies   Allergen Reactions    Sulfa Antibiotics Unknown    Sulfamethoxazole-Trimethoprim Nausea and Nausea and Vomiting    Trimethoprim Rash      Social History     Tobacco Use    Smoking status: Never    Smokeless tobacco: Never   Substance Use Topics    Alcohol use: Not Currently      Wt Readings from Last 1 Encounters:   01/10/24 119.8 kg (264 lb 3.2 oz)        Anesthesia Evaluation   Pt has had prior anesthetic. Type: General and MAC.    History of anesthetic complications (Agitated wakeup, was told family needed to be nearby with PACU recovery)  - motion sickness and PONV.      ROS/MED HX  ENT/Pulmonary:     (+)     EDDA risk factors,   obese,  observed stopped breathing,           Mild Persistent, asthma Last exacerbation: 2023,  Treatment: Oral steroids and Inhaled steroids,       recent URI,  resolving, completed antibiotics and steroid dosing:     (-) tobacco use   Neurologic: Comment: IIH s/p R  shunt with question on functionality  Recurrent papilledema    (+)      migraines,                       (-) no seizures and no CVA   Cardiovascular: Comment: Tachycardia with negative work up, on metprolol    (+) Dyslipidemia - -   -  - -            "             dysrhythmias, Other,        Previous cardiac testing  (-) taking anticoagulants/antiplatelets and URRUTIA   METS/Exercise Tolerance: 3 - Able to walk 1-2 blocks without stopping    Hematologic:    (-) history of blood clots and history of blood transfusion   Musculoskeletal: Comment: Reports possible fibromyalgia, scoliosis, and inflammation       GI/Hepatic: Comment: Fatty liver    (+) GERD, Asymptomatic on medication,           liver disease,       Renal/Genitourinary:  - neg Renal ROS     Endo: Comment: PCOS    (+)               Obesity,    (-) chronic steroid usage   Psychiatric/Substance Use:     (+) psychiatric history anxiety, depression and other (comment) (PTSD)   Recreational drug usage: Cannabis (Occ edibles).    Infectious Disease:  - neg infectious disease ROS     Malignancy:  - neg malignancy ROS     Other:  - neg other ROS    (+)  , H/O Chronic Pain,         Physical Exam    Airway         TM distance: > 3 FB   Neck ROM: full   Mouth opening: > 3 cm    Respiratory Devices and Support         Dental     Comment: Patient reports no loose or chipped teeth        Cardiovascular          Rhythm and rate: regular and normal     Pulmonary           breath sounds clear to auscultation           OUTSIDE LABS:  CBC:   Lab Results   Component Value Date    WBC 14.5 (H) 01/10/2024    HGB 13.0 01/10/2024    HCT 38.1 01/10/2024     01/10/2024     BMP:   Lab Results   Component Value Date     01/10/2024    POTASSIUM 4.1 01/10/2024    CHLORIDE 102 01/10/2024    CO2 20 (L) 01/10/2024    BUN 17.5 01/10/2024    CR 0.72 01/10/2024    GLC 92 01/10/2024     COAGS: No results found for: \"PTT\", \"INR\", \"FIBR\"  POC: No results found for: \"BGM\", \"HCG\", \"HCGS\"  HEPATIC: No results found for: \"ALBUMIN\", \"PROTTOTAL\", \"ALT\", \"AST\", \"GGT\", \"ALKPHOS\", \"BILITOTAL\", \"BILIDIRECT\", \"BRET\"  OTHER:   Lab Results   Component Value Date    KEV 9.2 01/10/2024       Anesthesia Plan    ASA Status:  3    NPO Status:  NPO " "Appropriate    Anesthesia Type: General.     - Airway: ETT   Induction: Intravenous.   Maintenance: Balanced.   Techniques and Equipment:     - Lines/Monitors: BIS     Consents    Anesthesia Plan(s) and associated risks, benefits, and realistic alternatives discussed. Questions answered and patient/representative(s) expressed understanding.     - Discussed:     - Discussed with:  Patient      - Extended Intubation/Ventilatory Support Discussed: No.      - Patient is DNR/DNI Status: No     Use of blood products discussed: Yes.     - Discussed with: Patient.     - Consented: consented to blood products     Postoperative Care    Pain management: IV analgesics, Oral pain medications, Multi-modal analgesia.   PONV prophylaxis: Ondansetron (or other 5HT-3), Dexamethasone or Solumedrol, Scopolamine patch, Background Propofol Infusion     Comments:               Jakob Vang MD    I have reviewed the pertinent notes and labs in the chart from the past 30 days and (re)examined the patient.  Any updates or changes from those notes are reflected in this note.              # Severe Obesity: Estimated body mass index is 43.97 kg/m  as calculated from the following:    Height as of 1/10/24: 1.651 m (5' 5\").    Weight as of 1/10/24: 119.8 kg (264 lb 3.2 oz).      "

## 2024-01-22 ENCOUNTER — HOSPITAL ENCOUNTER (INPATIENT)
Facility: CLINIC | Age: 24
LOS: 2 days | Discharge: HOME | End: 2024-01-24
Attending: NEUROLOGICAL SURGERY | Admitting: NEUROLOGICAL SURGERY
Payer: COMMERCIAL

## 2024-01-22 ENCOUNTER — APPOINTMENT (OUTPATIENT)
Dept: CT IMAGING | Facility: CLINIC | Age: 24
End: 2024-01-22
Attending: NURSE PRACTITIONER
Payer: COMMERCIAL

## 2024-01-22 ENCOUNTER — ANESTHESIA (OUTPATIENT)
Dept: SURGERY | Facility: CLINIC | Age: 24
End: 2024-01-22
Payer: COMMERCIAL

## 2024-01-22 ENCOUNTER — APPOINTMENT (OUTPATIENT)
Dept: GENERAL RADIOLOGY | Facility: CLINIC | Age: 24
End: 2024-01-22
Payer: COMMERCIAL

## 2024-01-22 ENCOUNTER — APPOINTMENT (OUTPATIENT)
Dept: CT IMAGING | Facility: CLINIC | Age: 24
End: 2024-01-22
Payer: COMMERCIAL

## 2024-01-22 DIAGNOSIS — Z98.2 S/P VENTRICULOPERITONEAL SHUNT: Primary | ICD-10-CM

## 2024-01-22 LAB
ANION GAP SERPL CALCULATED.3IONS-SCNC: 14 MMOL/L (ref 7–15)
APPEARANCE CSF: ABNORMAL
APTT PPP: 25 SECONDS (ref 22–38)
BUN SERPL-MCNC: 14.7 MG/DL (ref 6–20)
CALCIUM SERPL-MCNC: 9.4 MG/DL (ref 8.6–10)
CHLORIDE SERPL-SCNC: 104 MMOL/L (ref 98–107)
COLOR CSF: ABNORMAL
CREAT SERPL-MCNC: 0.79 MG/DL (ref 0.51–0.95)
DEPRECATED HCO3 PLAS-SCNC: 19 MMOL/L (ref 22–29)
EGFRCR SERPLBLD CKD-EPI 2021: >90 ML/MIN/1.73M2
EOSINOPHIL NFR CSF MANUAL: 1 %
ERYTHROCYTE [DISTWIDTH] IN BLOOD BY AUTOMATED COUNT: 13.2 % (ref 10–15)
GLUCOSE CSF-MCNC: 72 MG/DL (ref 40–70)
GLUCOSE SERPL-MCNC: 101 MG/DL (ref 70–99)
HCT VFR BLD AUTO: 36 % (ref 35–47)
HGB BLD-MCNC: 12.1 G/DL (ref 11.7–15.7)
INR PPP: 1.01 (ref 0.85–1.15)
LYMPH ABN NFR CSF MANUAL: 13 %
MCH RBC QN AUTO: 27.4 PG (ref 26.5–33)
MCHC RBC AUTO-ENTMCNC: 33.6 G/DL (ref 31.5–36.5)
MCV RBC AUTO: 82 FL (ref 78–100)
MONOS+MACROS NFR CSF MANUAL: NORMAL %
NEUTROPHILS NFR CSF MANUAL: 86 %
PLATELET # BLD AUTO: 296 10E3/UL (ref 150–450)
POTASSIUM SERPL-SCNC: 3.9 MMOL/L (ref 3.4–5.3)
PROT CSF-MCNC: 160 MG/DL (ref 15–45)
RADIOLOGIST FLAGS: ABNORMAL
RBC # BLD AUTO: 4.41 10E6/UL (ref 3.8–5.2)
RBC # CSF MANUAL: ABNORMAL /UL (ref 0–2)
SODIUM SERPL-SCNC: 137 MMOL/L (ref 135–145)
TUBE # CSF: ABNORMAL
WBC # BLD AUTO: 9.3 10E3/UL (ref 4–11)
WBC # CSF MANUAL: 48 /UL (ref 0–5)

## 2024-01-22 PROCEDURE — 250N000011 HC RX IP 250 OP 636: Performed by: ANESTHESIOLOGY

## 2024-01-22 PROCEDURE — 70250 X-RAY EXAM OF SKULL: CPT | Mod: 26 | Performed by: RADIOLOGY

## 2024-01-22 PROCEDURE — 82945 GLUCOSE OTHER FLUID: CPT | Performed by: NEUROLOGICAL SURGERY

## 2024-01-22 PROCEDURE — 120N000002 HC R&B MED SURG/OB UMMC

## 2024-01-22 PROCEDURE — 80048 BASIC METABOLIC PNL TOTAL CA: CPT | Performed by: CLINICAL NURSE SPECIALIST

## 2024-01-22 PROCEDURE — 85730 THROMBOPLASTIN TIME PARTIAL: CPT

## 2024-01-22 PROCEDURE — 250N000009 HC RX 250: Performed by: NEUROLOGICAL SURGERY

## 2024-01-22 PROCEDURE — 360N000086 HC SURGERY LEVEL 6 W/ FLUORO, PER MIN: Performed by: NEUROLOGICAL SURGERY

## 2024-01-22 PROCEDURE — 370N000017 HC ANESTHESIA TECHNICAL FEE, PER MIN: Performed by: NEUROLOGICAL SURGERY

## 2024-01-22 PROCEDURE — 250N000009 HC RX 250: Performed by: ANESTHESIOLOGY

## 2024-01-22 PROCEDURE — 0WWG0JZ REVISION OF SYNTHETIC SUBSTITUTE IN PERITONEAL CAVITY, OPEN APPROACH: ICD-10-PCS | Performed by: NEUROLOGICAL SURGERY

## 2024-01-22 PROCEDURE — 36415 COLL VENOUS BLD VENIPUNCTURE: CPT

## 2024-01-22 PROCEDURE — 250N000011 HC RX IP 250 OP 636: Performed by: NEUROLOGICAL SURGERY

## 2024-01-22 PROCEDURE — 250N000013 HC RX MED GY IP 250 OP 250 PS 637

## 2024-01-22 PROCEDURE — 85027 COMPLETE CBC AUTOMATED: CPT

## 2024-01-22 PROCEDURE — 250N000025 HC SEVOFLURANE, PER MIN: Performed by: NEUROLOGICAL SURGERY

## 2024-01-22 PROCEDURE — 710N000010 HC RECOVERY PHASE 1, LEVEL 2, PER MIN: Performed by: NEUROLOGICAL SURGERY

## 2024-01-22 PROCEDURE — 250N000011 HC RX IP 250 OP 636

## 2024-01-22 PROCEDURE — 258N000003 HC RX IP 258 OP 636

## 2024-01-22 PROCEDURE — 70450 CT HEAD/BRAIN W/O DYE: CPT | Mod: 77

## 2024-01-22 PROCEDURE — 999N000141 HC STATISTIC PRE-PROCEDURE NURSING ASSESSMENT: Performed by: NEUROLOGICAL SURGERY

## 2024-01-22 PROCEDURE — 87206 SMEAR FLUORESCENT/ACID STAI: CPT | Performed by: NEUROLOGICAL SURGERY

## 2024-01-22 PROCEDURE — 89051 BODY FLUID CELL COUNT: CPT | Performed by: NEUROLOGICAL SURGERY

## 2024-01-22 PROCEDURE — 87070 CULTURE OTHR SPECIMN AEROBIC: CPT | Performed by: NEUROLOGICAL SURGERY

## 2024-01-22 PROCEDURE — 87102 FUNGUS ISOLATION CULTURE: CPT | Performed by: NEUROLOGICAL SURGERY

## 2024-01-22 PROCEDURE — 62225 REPLACE/IRRIGATE CATHETER: CPT | Mod: 51 | Performed by: NEUROLOGICAL SURGERY

## 2024-01-22 PROCEDURE — 00W60JZ REVISION OF SYNTHETIC SUBSTITUTE IN CEREBRAL VENTRICLE, OPEN APPROACH: ICD-10-PCS | Performed by: NEUROLOGICAL SURGERY

## 2024-01-22 PROCEDURE — 250N000009 HC RX 250

## 2024-01-22 PROCEDURE — 272N000001 HC OR GENERAL SUPPLY STERILE: Performed by: NEUROLOGICAL SURGERY

## 2024-01-22 PROCEDURE — 85610 PROTHROMBIN TIME: CPT

## 2024-01-22 PROCEDURE — 278N000051 HC OR IMPLANT GENERAL: Performed by: NEUROLOGICAL SURGERY

## 2024-01-22 PROCEDURE — 62230 REPLACE/REVISE BRAIN SHUNT: CPT | Mod: GC | Performed by: NEUROLOGICAL SURGERY

## 2024-01-22 PROCEDURE — 87076 CULTURE ANAEROBE IDENT EACH: CPT | Performed by: NEUROLOGICAL SURGERY

## 2024-01-22 PROCEDURE — 70450 CT HEAD/BRAIN W/O DYE: CPT | Mod: 26 | Performed by: RADIOLOGY

## 2024-01-22 PROCEDURE — 87205 SMEAR GRAM STAIN: CPT | Performed by: NEUROLOGICAL SURGERY

## 2024-01-22 PROCEDURE — 999N000065 XR SHUNT MALFUNCTION SURVEY

## 2024-01-22 PROCEDURE — 250N000013 HC RX MED GY IP 250 OP 250 PS 637: Performed by: ANESTHESIOLOGY

## 2024-01-22 PROCEDURE — 84157 ASSAY OF PROTEIN OTHER: CPT | Performed by: NEUROLOGICAL SURGERY

## 2024-01-22 PROCEDURE — 74019 RADEX ABDOMEN 2 VIEWS: CPT | Mod: 26 | Performed by: RADIOLOGY

## 2024-01-22 PROCEDURE — 70450 CT HEAD/BRAIN W/O DYE: CPT

## 2024-01-22 PROCEDURE — 71045 X-RAY EXAM CHEST 1 VIEW: CPT | Mod: 26 | Performed by: RADIOLOGY

## 2024-01-22 DEVICE — SHUNT CATH VENTRICULAR ANTIBIOTIC-IMPREG ARES 23CM 91101
Type: IMPLANTABLE DEVICE | Site: BRAIN | Status: NON-FUNCTIONAL
Removed: 2024-01-29

## 2024-01-22 RX ORDER — HYDROMORPHONE HCL IN WATER/PF 6 MG/30 ML
0.4 PATIENT CONTROLLED ANALGESIA SYRINGE INTRAVENOUS EVERY 5 MIN PRN
Status: DISCONTINUED | OUTPATIENT
Start: 2024-01-22 | End: 2024-01-22

## 2024-01-22 RX ORDER — HYDROMORPHONE HCL IN WATER/PF 6 MG/30 ML
0.4 PATIENT CONTROLLED ANALGESIA SYRINGE INTRAVENOUS
Status: DISCONTINUED | OUTPATIENT
Start: 2024-01-22 | End: 2024-01-23

## 2024-01-22 RX ORDER — FENTANYL CITRATE 50 UG/ML
50 INJECTION, SOLUTION INTRAMUSCULAR; INTRAVENOUS EVERY 5 MIN PRN
Status: DISCONTINUED | OUTPATIENT
Start: 2024-01-22 | End: 2024-01-22

## 2024-01-22 RX ORDER — CEFAZOLIN SODIUM/WATER 3 G/30 ML
3 SYRINGE (ML) INTRAVENOUS
Status: COMPLETED | OUTPATIENT
Start: 2024-01-22 | End: 2024-01-22

## 2024-01-22 RX ORDER — LIDOCAINE 40 MG/G
CREAM TOPICAL
Status: DISCONTINUED | OUTPATIENT
Start: 2024-01-22 | End: 2024-01-22 | Stop reason: HOSPADM

## 2024-01-22 RX ORDER — ONDANSETRON 2 MG/ML
4 INJECTION INTRAMUSCULAR; INTRAVENOUS EVERY 30 MIN PRN
Status: DISCONTINUED | OUTPATIENT
Start: 2024-01-22 | End: 2024-01-22

## 2024-01-22 RX ORDER — CEFAZOLIN SODIUM/WATER 3 G/30 ML
3 SYRINGE (ML) INTRAVENOUS SEE ADMIN INSTRUCTIONS
Status: DISCONTINUED | OUTPATIENT
Start: 2024-01-22 | End: 2024-01-22 | Stop reason: HOSPADM

## 2024-01-22 RX ORDER — FENTANYL CITRATE 50 UG/ML
INJECTION, SOLUTION INTRAMUSCULAR; INTRAVENOUS PRN
Status: DISCONTINUED | OUTPATIENT
Start: 2024-01-22 | End: 2024-01-22

## 2024-01-22 RX ORDER — PROCHLORPERAZINE MALEATE 10 MG
10 TABLET ORAL EVERY 6 HOURS PRN
Status: DISCONTINUED | OUTPATIENT
Start: 2024-01-22 | End: 2024-01-24 | Stop reason: HOSPADM

## 2024-01-22 RX ORDER — PRAZOSIN HYDROCHLORIDE 2 MG/1
2 CAPSULE ORAL AT BEDTIME
Status: DISCONTINUED | OUTPATIENT
Start: 2024-01-22 | End: 2024-01-24 | Stop reason: HOSPADM

## 2024-01-22 RX ORDER — ONDANSETRON 4 MG/1
4 TABLET, ORALLY DISINTEGRATING ORAL EVERY 6 HOURS PRN
Status: DISCONTINUED | OUTPATIENT
Start: 2024-01-22 | End: 2024-01-24 | Stop reason: HOSPADM

## 2024-01-22 RX ORDER — HYDROMORPHONE HCL IN WATER/PF 6 MG/30 ML
0.2 PATIENT CONTROLLED ANALGESIA SYRINGE INTRAVENOUS EVERY 5 MIN PRN
Status: DISCONTINUED | OUTPATIENT
Start: 2024-01-22 | End: 2024-01-22

## 2024-01-22 RX ORDER — SODIUM CHLORIDE, SODIUM LACTATE, POTASSIUM CHLORIDE, CALCIUM CHLORIDE 600; 310; 30; 20 MG/100ML; MG/100ML; MG/100ML; MG/100ML
INJECTION, SOLUTION INTRAVENOUS CONTINUOUS
Status: DISCONTINUED | OUTPATIENT
Start: 2024-01-22 | End: 2024-01-22

## 2024-01-22 RX ORDER — DEXAMETHASONE SODIUM PHOSPHATE 4 MG/ML
INJECTION, SOLUTION INTRA-ARTICULAR; INTRALESIONAL; INTRAMUSCULAR; INTRAVENOUS; SOFT TISSUE PRN
Status: DISCONTINUED | OUTPATIENT
Start: 2024-01-22 | End: 2024-01-22

## 2024-01-22 RX ORDER — HYDRALAZINE HYDROCHLORIDE 20 MG/ML
2.5-5 INJECTION INTRAMUSCULAR; INTRAVENOUS EVERY 10 MIN PRN
Status: DISCONTINUED | OUTPATIENT
Start: 2024-01-22 | End: 2024-01-22

## 2024-01-22 RX ORDER — MEPERIDINE HYDROCHLORIDE 25 MG/ML
12.5 INJECTION INTRAMUSCULAR; INTRAVENOUS; SUBCUTANEOUS EVERY 5 MIN PRN
Status: DISCONTINUED | OUTPATIENT
Start: 2024-01-22 | End: 2024-01-22

## 2024-01-22 RX ORDER — ACETAMINOPHEN 325 MG/1
650 TABLET ORAL EVERY 4 HOURS PRN
Status: DISCONTINUED | OUTPATIENT
Start: 2024-01-25 | End: 2024-01-24 | Stop reason: HOSPADM

## 2024-01-22 RX ORDER — MECLIZINE HCL 12.5 MG 12.5 MG/1
12.5-25 TABLET ORAL 3 TIMES DAILY PRN
Status: DISCONTINUED | OUTPATIENT
Start: 2024-01-22 | End: 2024-01-24 | Stop reason: HOSPADM

## 2024-01-22 RX ORDER — OXYCODONE HYDROCHLORIDE 5 MG/1
5 TABLET ORAL EVERY 4 HOURS PRN
Status: DISCONTINUED | OUTPATIENT
Start: 2024-01-22 | End: 2024-01-24 | Stop reason: HOSPADM

## 2024-01-22 RX ORDER — ONDANSETRON 2 MG/ML
4 INJECTION INTRAMUSCULAR; INTRAVENOUS EVERY 6 HOURS PRN
Status: DISCONTINUED | OUTPATIENT
Start: 2024-01-22 | End: 2024-01-24 | Stop reason: HOSPADM

## 2024-01-22 RX ORDER — BUPIVACAINE HYDROCHLORIDE AND EPINEPHRINE 2.5; 5 MG/ML; UG/ML
INJECTION, SOLUTION INFILTRATION; PERINEURAL PRN
Status: DISCONTINUED | OUTPATIENT
Start: 2024-01-22 | End: 2024-01-22 | Stop reason: HOSPADM

## 2024-01-22 RX ORDER — FENTANYL CITRATE 50 UG/ML
25 INJECTION, SOLUTION INTRAMUSCULAR; INTRAVENOUS EVERY 5 MIN PRN
Status: DISCONTINUED | OUTPATIENT
Start: 2024-01-22 | End: 2024-01-22

## 2024-01-22 RX ORDER — POLYETHYLENE GLYCOL 3350 17 G/17G
17 POWDER, FOR SOLUTION ORAL DAILY
Status: DISCONTINUED | OUTPATIENT
Start: 2024-01-23 | End: 2024-01-24 | Stop reason: HOSPADM

## 2024-01-22 RX ORDER — LABETALOL HYDROCHLORIDE 5 MG/ML
10 INJECTION, SOLUTION INTRAVENOUS
Status: DISCONTINUED | OUTPATIENT
Start: 2024-01-22 | End: 2024-01-22

## 2024-01-22 RX ORDER — ALBUTEROL SULFATE 0.83 MG/ML
2.5 SOLUTION RESPIRATORY (INHALATION) ONCE
Status: COMPLETED | OUTPATIENT
Start: 2024-01-22 | End: 2024-01-22

## 2024-01-22 RX ORDER — OXYCODONE HYDROCHLORIDE 10 MG/1
10 TABLET ORAL EVERY 4 HOURS PRN
Status: DISCONTINUED | OUTPATIENT
Start: 2024-01-22 | End: 2024-01-24 | Stop reason: HOSPADM

## 2024-01-22 RX ORDER — CYCLOBENZAPRINE HCL 10 MG
10 TABLET ORAL 3 TIMES DAILY PRN
Status: DISCONTINUED | OUTPATIENT
Start: 2024-01-22 | End: 2024-01-23

## 2024-01-22 RX ORDER — CETIRIZINE HYDROCHLORIDE 10 MG/1
10 TABLET ORAL DAILY PRN
Status: DISCONTINUED | OUTPATIENT
Start: 2024-01-22 | End: 2024-01-24 | Stop reason: HOSPADM

## 2024-01-22 RX ORDER — FLUTICASONE FUROATE AND VILANTEROL 100; 25 UG/1; UG/1
1 POWDER RESPIRATORY (INHALATION) DAILY PRN
Status: DISCONTINUED | OUTPATIENT
Start: 2024-01-22 | End: 2024-01-24 | Stop reason: HOSPADM

## 2024-01-22 RX ORDER — SODIUM CHLORIDE, SODIUM GLUCONATE, SODIUM ACETATE, POTASSIUM CHLORIDE AND MAGNESIUM CHLORIDE 526; 502; 368; 37; 30 MG/100ML; MG/100ML; MG/100ML; MG/100ML; MG/100ML
INJECTION, SOLUTION INTRAVENOUS CONTINUOUS PRN
Status: DISCONTINUED | OUTPATIENT
Start: 2024-01-22 | End: 2024-01-22

## 2024-01-22 RX ORDER — CEFAZOLIN SODIUM 2 G/100ML
2 INJECTION, SOLUTION INTRAVENOUS EVERY 8 HOURS
Status: COMPLETED | OUTPATIENT
Start: 2024-01-22 | End: 2024-01-23

## 2024-01-22 RX ORDER — ACETAMINOPHEN 325 MG/1
975 TABLET ORAL EVERY 8 HOURS
Status: DISCONTINUED | OUTPATIENT
Start: 2024-01-22 | End: 2024-01-24 | Stop reason: HOSPADM

## 2024-01-22 RX ORDER — LIDOCAINE HYDROCHLORIDE 20 MG/ML
INJECTION, SOLUTION INFILTRATION; PERINEURAL PRN
Status: DISCONTINUED | OUTPATIENT
Start: 2024-01-22 | End: 2024-01-22

## 2024-01-22 RX ORDER — HYDROMORPHONE HCL IN WATER/PF 6 MG/30 ML
0.2 PATIENT CONTROLLED ANALGESIA SYRINGE INTRAVENOUS
Status: DISCONTINUED | OUTPATIENT
Start: 2024-01-22 | End: 2024-01-23

## 2024-01-22 RX ORDER — SCOLOPAMINE TRANSDERMAL SYSTEM 1 MG/1
1 PATCH, EXTENDED RELEASE TRANSDERMAL ONCE
Status: COMPLETED | OUTPATIENT
Start: 2024-01-22 | End: 2024-01-22

## 2024-01-22 RX ORDER — AMOXICILLIN 250 MG
1 CAPSULE ORAL 2 TIMES DAILY
Status: DISCONTINUED | OUTPATIENT
Start: 2024-01-22 | End: 2024-01-24 | Stop reason: HOSPADM

## 2024-01-22 RX ORDER — PROPOFOL 10 MG/ML
INJECTION, EMULSION INTRAVENOUS PRN
Status: DISCONTINUED | OUTPATIENT
Start: 2024-01-22 | End: 2024-01-22

## 2024-01-22 RX ORDER — BISACODYL 10 MG
10 SUPPOSITORY, RECTAL RECTAL DAILY PRN
Status: DISCONTINUED | OUTPATIENT
Start: 2024-01-22 | End: 2024-01-24 | Stop reason: HOSPADM

## 2024-01-22 RX ORDER — SODIUM CHLORIDE, SODIUM LACTATE, POTASSIUM CHLORIDE, CALCIUM CHLORIDE 600; 310; 30; 20 MG/100ML; MG/100ML; MG/100ML; MG/100ML
INJECTION, SOLUTION INTRAVENOUS CONTINUOUS
Status: DISCONTINUED | OUTPATIENT
Start: 2024-01-22 | End: 2024-01-22 | Stop reason: HOSPADM

## 2024-01-22 RX ORDER — ONDANSETRON 2 MG/ML
INJECTION INTRAMUSCULAR; INTRAVENOUS PRN
Status: DISCONTINUED | OUTPATIENT
Start: 2024-01-22 | End: 2024-01-22

## 2024-01-22 RX ORDER — DEXMEDETOMIDINE HYDROCHLORIDE 4 UG/ML
INJECTION, SOLUTION INTRAVENOUS PRN
Status: DISCONTINUED | OUTPATIENT
Start: 2024-01-22 | End: 2024-01-22

## 2024-01-22 RX ORDER — LIDOCAINE 40 MG/G
CREAM TOPICAL
Status: DISCONTINUED | OUTPATIENT
Start: 2024-01-22 | End: 2024-01-24 | Stop reason: HOSPADM

## 2024-01-22 RX ORDER — ACETAMINOPHEN 325 MG/1
975 TABLET ORAL ONCE
Status: COMPLETED | OUTPATIENT
Start: 2024-01-22 | End: 2024-01-22

## 2024-01-22 RX ORDER — DEXAMETHASONE SODIUM PHOSPHATE 4 MG/ML
4 INJECTION, SOLUTION INTRA-ARTICULAR; INTRALESIONAL; INTRAMUSCULAR; INTRAVENOUS; SOFT TISSUE
Status: DISCONTINUED | OUTPATIENT
Start: 2024-01-22 | End: 2024-01-22

## 2024-01-22 RX ORDER — ONDANSETRON 4 MG/1
4 TABLET, ORALLY DISINTEGRATING ORAL EVERY 30 MIN PRN
Status: DISCONTINUED | OUTPATIENT
Start: 2024-01-22 | End: 2024-01-22

## 2024-01-22 RX ORDER — SODIUM CHLORIDE 9 MG/ML
INJECTION, SOLUTION INTRAVENOUS CONTINUOUS
Status: ACTIVE | OUTPATIENT
Start: 2024-01-22 | End: 2024-01-23

## 2024-01-22 RX ORDER — LIDOCAINE HYDROCHLORIDE AND EPINEPHRINE 10; 10 MG/ML; UG/ML
INJECTION, SOLUTION INFILTRATION; PERINEURAL PRN
Status: DISCONTINUED | OUTPATIENT
Start: 2024-01-22 | End: 2024-01-22 | Stop reason: HOSPADM

## 2024-01-22 RX ADMIN — ACETAMINOPHEN 975 MG: 325 TABLET, FILM COATED ORAL at 22:04

## 2024-01-22 RX ADMIN — PHENYLEPHRINE HYDROCHLORIDE 100 MCG: 10 INJECTION INTRAVENOUS at 09:00

## 2024-01-22 RX ADMIN — PROPOFOL 200 MG: 10 INJECTION, EMULSION INTRAVENOUS at 07:59

## 2024-01-22 RX ADMIN — SENNOSIDES AND DOCUSATE SODIUM 1 TABLET: 8.6; 5 TABLET ORAL at 19:56

## 2024-01-22 RX ADMIN — Medication 150 MG: at 23:08

## 2024-01-22 RX ADMIN — FENTANYL CITRATE 100 MCG: 50 INJECTION INTRAMUSCULAR; INTRAVENOUS at 07:59

## 2024-01-22 RX ADMIN — FENTANYL CITRATE 50 MCG: 50 INJECTION INTRAMUSCULAR; INTRAVENOUS at 11:06

## 2024-01-22 RX ADMIN — SODIUM CHLORIDE, SODIUM GLUCONATE, SODIUM ACETATE, POTASSIUM CHLORIDE AND MAGNESIUM CHLORIDE: 526; 502; 368; 37; 30 INJECTION, SOLUTION INTRAVENOUS at 07:45

## 2024-01-22 RX ADMIN — SUGAMMADEX 200 MG: 100 INJECTION, SOLUTION INTRAVENOUS at 10:51

## 2024-01-22 RX ADMIN — ACETAMINOPHEN 975 MG: 325 TABLET, FILM COATED ORAL at 06:26

## 2024-01-22 RX ADMIN — Medication 5 MG: at 10:24

## 2024-01-22 RX ADMIN — SODIUM CHLORIDE, SODIUM GLUCONATE, SODIUM ACETATE, POTASSIUM CHLORIDE AND MAGNESIUM CHLORIDE: 526; 502; 368; 37; 30 INJECTION, SOLUTION INTRAVENOUS at 09:00

## 2024-01-22 RX ADMIN — SCOPALAMINE 1 PATCH: 1 PATCH, EXTENDED RELEASE TRANSDERMAL at 06:25

## 2024-01-22 RX ADMIN — SODIUM CHLORIDE: 9 INJECTION, SOLUTION INTRAVENOUS at 17:48

## 2024-01-22 RX ADMIN — FENTANYL CITRATE 25 MCG: 50 INJECTION, SOLUTION INTRAMUSCULAR; INTRAVENOUS at 12:49

## 2024-01-22 RX ADMIN — OMEPRAZOLE 20 MG: 20 CAPSULE, DELAYED RELEASE ORAL at 22:05

## 2024-01-22 RX ADMIN — Medication 10 MG: at 09:51

## 2024-01-22 RX ADMIN — PHENYLEPHRINE HYDROCHLORIDE 100 MCG: 10 INJECTION INTRAVENOUS at 11:11

## 2024-01-22 RX ADMIN — PHENYLEPHRINE HYDROCHLORIDE 100 MCG: 10 INJECTION INTRAVENOUS at 09:53

## 2024-01-22 RX ADMIN — OXYCODONE HYDROCHLORIDE 5 MG: 5 TABLET ORAL at 19:56

## 2024-01-22 RX ADMIN — FENTANYL CITRATE 50 MCG: 50 INJECTION, SOLUTION INTRAMUSCULAR; INTRAVENOUS at 11:43

## 2024-01-22 RX ADMIN — Medication 20 MG: at 08:55

## 2024-01-22 RX ADMIN — MIDAZOLAM 1 MG: 1 INJECTION INTRAMUSCULAR; INTRAVENOUS at 07:32

## 2024-01-22 RX ADMIN — SERTRALINE HYDROCHLORIDE 75 MG: 50 TABLET ORAL at 22:05

## 2024-01-22 RX ADMIN — OXYCODONE HYDROCHLORIDE 5 MG: 5 TABLET ORAL at 14:04

## 2024-01-22 RX ADMIN — PHENYLEPHRINE HYDROCHLORIDE 100 MCG: 10 INJECTION INTRAVENOUS at 09:10

## 2024-01-22 RX ADMIN — ALBUTEROL SULFATE 2.5 MG: 2.5 SOLUTION RESPIRATORY (INHALATION) at 06:26

## 2024-01-22 RX ADMIN — PHENYLEPHRINE HYDROCHLORIDE 100 MCG: 10 INJECTION INTRAVENOUS at 08:48

## 2024-01-22 RX ADMIN — FENTANYL CITRATE 50 MCG: 50 INJECTION INTRAMUSCULAR; INTRAVENOUS at 11:31

## 2024-01-22 RX ADMIN — DEXAMETHASONE SODIUM PHOSPHATE 4 MG: 4 INJECTION, SOLUTION INTRA-ARTICULAR; INTRALESIONAL; INTRAMUSCULAR; INTRAVENOUS; SOFT TISSUE at 08:22

## 2024-01-22 RX ADMIN — HYDROMORPHONE HYDROCHLORIDE 0.5 MG: 1 INJECTION, SOLUTION INTRAMUSCULAR; INTRAVENOUS; SUBCUTANEOUS at 10:42

## 2024-01-22 RX ADMIN — Medication 12.5 MG: at 19:56

## 2024-01-22 RX ADMIN — PHENYLEPHRINE HYDROCHLORIDE 100 MCG: 10 INJECTION INTRAVENOUS at 08:15

## 2024-01-22 RX ADMIN — CEFAZOLIN SODIUM 2 G: 2 INJECTION, SOLUTION INTRAVENOUS at 19:15

## 2024-01-22 RX ADMIN — Medication 20 MCG: at 11:00

## 2024-01-22 RX ADMIN — PHENYLEPHRINE HYDROCHLORIDE 100 MCG: 10 INJECTION INTRAVENOUS at 10:10

## 2024-01-22 RX ADMIN — Medication 50 MG: at 07:59

## 2024-01-22 RX ADMIN — PHENYLEPHRINE HYDROCHLORIDE 200 MCG: 10 INJECTION INTRAVENOUS at 09:03

## 2024-01-22 RX ADMIN — LIDOCAINE HYDROCHLORIDE 100 MG: 20 INJECTION, SOLUTION INFILTRATION; PERINEURAL at 07:59

## 2024-01-22 RX ADMIN — FENTANYL CITRATE 25 MCG: 50 INJECTION, SOLUTION INTRAMUSCULAR; INTRAVENOUS at 11:56

## 2024-01-22 RX ADMIN — ACETAMINOPHEN 975 MG: 325 TABLET, FILM COATED ORAL at 14:04

## 2024-01-22 RX ADMIN — Medication 3 G: at 08:18

## 2024-01-22 RX ADMIN — ONDANSETRON 4 MG: 2 INJECTION INTRAMUSCULAR; INTRAVENOUS at 10:49

## 2024-01-22 ASSESSMENT — VISUAL ACUITY
OU: NORMAL ACUITY

## 2024-01-22 ASSESSMENT — ACTIVITIES OF DAILY LIVING (ADL)
ADLS_ACUITY_SCORE: 20

## 2024-01-22 NOTE — ANESTHESIA CARE TRANSFER NOTE
Patient: Leigh Sloan    Procedure: Procedure(s):  Stealth guided right sided ventriculoperitoneal shunt exploration and replacement of right sided proximal ventricular catheter and shunt valve       Diagnosis: IIH (idiopathic intracranial hypertension) [G93.2]  Diagnosis Additional Information: No value filed.    Anesthesia Type:   General     Note:    Oropharynx: oropharynx clear of all foreign objects and spontaneously breathing  Level of Consciousness: awake and drowsy  Oxygen Supplementation: face mask  Level of Supplemental Oxygen (L/min / FiO2): 4  Independent Airway: airway patency satisfactory and stable  Dentition: dentition unchanged  Vital Signs Stable: post-procedure vital signs reviewed and stable  Report to RN Given: handoff report given  Patient transferred to: PACU    Handoff Report: Identifed the Patient, Identified the Reponsible Provider, Reviewed the pertinent medical history, Discussed the surgical course, Reviewed Intra-OP anesthesia mangement and issues during anesthesia, Set expectations for post-procedure period and Allowed opportunity for questions and acknowledgement of understanding      Vitals:  Vitals Value Taken Time   /57 01/22/24 1130   Temp 35.3  C (95.54  F) 01/22/24 1130   Pulse 121 01/22/24 1130   Resp 19 01/22/24 1130   SpO2 100 % 01/22/24 1130   Vitals shown include unfiled device data.    Electronically Signed By: Jakob Vang MD  January 22, 2024  11:31 AM

## 2024-01-22 NOTE — OP NOTE
PATIENT NAME: Leigh Sloan  PATIENT MRN: [id number]  PATIENT ACCOUNT: [id number]  PATIENT YOB: 2000    NEUROSURGERY OPERATIVE REPORT     DATE OF SURGERY: 1/22/2024     PREOPERATIVE DIAGNOSIS:  1.  IIH (idiopathic intracranial hypertension) [G93.2]   2.  Shunt malfunction   3.  Papilledema   4.  BMI 43      POSTOPERATIVE DIAGNOSIS:  1.  IIH (idiopathic intracranial hypertension) [G93.2]   2.  Shunt malfunction   3.  Papilledema   4.  BMI 43      PROCEDURES PERFORMED:  1. Stealth assisted revision of proximal catheter of ventriculoperitoneal shunt, right occipital  2. Replacement of Hakim shunt valve with Codman certas valve     STAFF SURGEON:   Dr Calvin Chiang Surgeon: None     RESIDENT SURGEONS:   Cristino Dahl MD, Simba Sanders MD     ANESTHESIA: General endotracheal anesthesia     ESTIMATED BLOOD LOSS: 5 mL     IMPLANTS:             Implant Name Type Inv. Item Serial No.  Lot No. LRB No. Used Action   SHUNT VALVE CERTAS PLUS W / SIPHONGUARD [id number] - [id number] Shunt SHUNT VALVE CERTAS PLUS W / SIPHONGUARD [id number]   [employer] [id number] Right 1 Implanted   SHUNT CATH VENTRICULAR ANTIBIOTIC-IMPREG KIMBERLY 23CM [id number] - [id number] Shunt SHUNT CATH VENTRICULAR ANTIBIOTIC-IMPREG KIMBERLY 23CM [id number]   [employer] [id number] Right 1 Wasted   SHUNT VALVE CERTAS PLUS W / SIPHONGUARD [id number] - [id number] Shunt SHUNT VALVE CERTAS PLUS W / SIPHONGUARD [id number]   [employer] [id number] Right 1 Wasted   SHUNT CATH VENTRICULAR ANTIBIOTIC-IMPREG KIMBERLY 23CM [id number] - [id number] Shunt SHUNT CATH VENTRICULAR ANTIBIOTIC-IMPREG KIMBERLY 23CM [id number]   [employer] [id number] Right 1 Implanted              EXPLANTS:  Hakim shunt valve  Proximal ventricular catheter      DRAINS: None     FINDINGS:   Absent good flow through shunt valve concerning for obstruction. Good flow through distal catheter. Absent flow through proximal ventricular catheter. Both the valve and proximal catheter were replaced  with good flow.  Certas set to 4. Some blood product visualized within the newly placed catheter, which subsequently spontaneously improved.     COMPLICATIONS: None apparent     INDICATIONS:   Ms. Sloan is a 23 year-old female with history of idiopathic intracranial hypertension s/p right occipital ventriculoperitoneal shunt (Codman Hakim 120) placement in 2020 in [place] for headaches, dizziness, vision issues with papilledema. Despite the shunt, she continued to have headaches, dizziness, tinnitus and short lived blurriness in vision. She was found to have shunted ventricles but had elevated ICP on lumbar puncture and recurrent papilledema on ophthalmologic evaluation. She did not have any symptoms concerning for infection. She has a sulfa allergy so diamox was not used in the past. Multiple attempts to reprogram shunt valve were not successful. After extensive discussion in the clinic, it was decided to pursue shunt exploration with possible revision and replacement. We discussed the indications and the risks of surgery including bleeding, infection, need for shunt revision/removal, neurologic injury like weakness/numbness/aphasia, stroke, CSF leak, failure to help her vision, need for additional procedures/operations. She was amendable for surgery.      DESCRIPTION OF PROCEDURE:  The patient was brought to the operating room and their identity was verified. The patient was transferred to the operating table and placed in supine position. General endotracheal anesthesia was obtained. A skaggs was placed. The bed was turned 180 degrees. Preoperative antibiotics were administered.      A small area on the right occipitoparietal scalp at the site of her shunt was shaved. Stealth Axiem was registered to the patient with < 2mm error. The patient was cleaned, prepared, and draped in sterile fashion. 10mL of local anesthetic was used. Timeout was performed.      The prior occipital incision was extended inferiorly to  expose hardware.  The flap was retracted using a 2-0 Vicryl suture. Hemostasis was achieved using bipolar cautery. The shunt valve was interrogated via 25 gauge butterfly needle connected to a manometer. There was no flow noted, concerning for shunt valve malfunction. Then the shunt valve was removed, and the distal catheter was interrogated. A good flow was noted through distal catheter. Later, the proximal catheter was interrogated and no flow was seen, concerning for malfunction. We switched out the Hakim shunt valve with Certas valve programmed to 4. The proximal cathter was removed, and a new antibiotic impregnated ventricular catheter was thereafter soft passed into the right lateral ventricle. Good CSF return was noted. However, some blood products were visualized from the proximal catheter. We let some CSF out to allow it to clear before securing the shunt system to the proximal catheter. A blood clot was noted in the shunt valve, and the flow from the proximal catheter was slow. The shunt and the proximal catheter were replaced and the CSF subsequently cleared with good flow through the shunt system. The final location of the proximal catheter was 8 cm at the outer bony surface. The catheter was then trimmed to an appropriate length and connected to the shunt valve. CSF samples, proximal catheter tip and the Hakim shunt valve were sent for cultures and analysis. Silk tie was used to secure the connection between the ventricular catheter and the valve. 2-0 Vicryl suture was then used to secure the shunt tubing and proximal end of the shunt valve to the periosteum with 2 points of contact.      Thereafter, we closed the cranial incision in layers with inverted, interrupted 2-0 vicryl for the galea and 3-0 running nylon. Xeroform was placed, and Primapore was used to cover the incision. The patient was extubated and transferred to PACU in stable position. At the end of the procedure, sponge and needle counts  were correct x 2.     Dr. Cabrera was present and scrubbed for the critical portions of the procedure and immediately available for the remainder.     Simba Sanders MD  Neurosurgery Resident  Pager: [phone]

## 2024-01-22 NOTE — BRIEF OP NOTE
[facility]    Brief Operative Note    Pre-operative diagnosis: IIH (idiopathic intracranial hypertension) [G93.2]  Post-operative diagnosis Same as pre-operative diagnosis    Procedure: Stealth assisted ventriculoperitoneal shunt revision possible explantation for pseudotumor cerebrii, Right - Head    Surgeon: Surgeon(s) and Role:     * Calvin Cabrera MD - Primary     * Cristino Dahl MD - Resident - Assisting  Anesthesia: General   Estimated Blood Loss: 30 ml     Drains: None  Specimens:   ID Type Source Tests Collected by Time Destination   A : Explanted Shunt Valve Foreign Body Other ANAEROBIC BACTERIAL CULTURE ROUTINE, GRAM STAIN (Canceled), FUNGAL OR YEAST CULTURE ROUTINE, AEROBIC BACTERIAL CULTURE ROUTINE Calvin Cabrera MD 1/22/2024  9:42 AM    B : CSF Cerebrospinal fluid Brain AFB CULTURE AND STAIN NON BLOOD, ANAEROBIC BACTERIAL CULTURE ROUTINE, GLUCOSE CSF, GRAM STAIN (Canceled), PROTEIN TOTAL CSF, FUNGAL OR YEAST CULTURE ROUTINE Calvin Cabrera MD 1/22/2024  9:45 AM    C : Explanted Ventricular Catheter Foreign Body Other ANAEROBIC BACTERIAL CULTURE ROUTINE, GRAM STAIN (Canceled), FUNGAL OR YEAST CULTURE ROUTINE, AEROBIC BACTERIAL CULTURE ROUTINE Calvin Cabrera MD 1/22/2024  9:50 AM    D : CSF #2 Cerebrospinal fluid Brain CELL COUNT WITH DIFFERENTIAL CSF Calvin Cabrera MD 1/22/2024  9:52 AM      Findings:   Absent good flow through valve concerning for obstruction. Good flow through distal catheter. Absent flow through proximal ventricular catheter. Both the valve and proximal catheter were replaced with good flow.  Certas set to 4. Some blood product visualized within the newly placed catheter, which subsequently spontaneously improved   Complications: None.  Implants:   Implant Name Type Inv. Item Serial No.  Lot No. LRB No. Used Action   SHUNT VALVE CERTAS PLUS W / SIPHONGUARD [id number] - [id number]  Shunt SHUNT VALVE CERTAS PLUS W / SIPHONGUARD [id number]  [employer] [id number] Right 1 Implanted   SHUNT CATH VENTRICULAR ANTIBIOTIC-IMPREG KIMBERLY 23CM 32757 - [id number] Shunt SHUNT CATH VENTRICULAR ANTIBIOTIC-IMPREG KIMBERLY 23CM 13739  [employer] [id number] Right 1 Wasted   SHUNT VALVE CERTAS PLUS W / SIPHONGUARD [id number] - [id number] Shunt SHUNT VALVE CERTAS PLUS W / SIPHONGUARD [id number]  [employer] [id number] Right 1 Wasted   SHUNT CATH VENTRICULAR ANTIBIOTIC-IMPREG KIMBERLY 23CM 04020 - [id number] Shunt SHUNT CATH VENTRICULAR ANTIBIOTIC-IMPREG KIMBERLY 23CM [id number]  [employer] [id number] Right 1 Implanted

## 2024-01-22 NOTE — ANESTHESIA POSTPROCEDURE EVALUATION
Patient: Leigh Sloan    Procedure: Procedure(s):  Stealth guided right sided ventriculoperitoneal shunt exploration and replacement of right sided proximal ventricular catheter and shunt valve       Anesthesia Type:  General    Note:  Disposition: Inpatient   Postop Pain Control: Uneventful            Sign Out: Well controlled pain   PONV: No   Neuro/Psych: Uneventful            Sign Out: Acceptable/Baseline neuro status   Airway/Respiratory: Uneventful            Sign Out: Acceptable/Baseline resp. status   CV/Hemodynamics: Uneventful            Sign Out: Acceptable CV status; No obvious hypovolemia; No obvious fluid overload   Other NRE: NONE   DID A NON-ROUTINE EVENT OCCUR? No           Last vitals:  Vitals Value Taken Time   /67 01/22/24 1330   Temp 36.5  C (97.7  F) 01/22/24 1300   Pulse 90 01/22/24 1332   Resp 0 01/22/24 1332   SpO2 95 % 01/22/24 1332   Vitals shown include unfiled device data.    Electronically Signed By: Hannah Mckinney MD  January 22, 2024  1:33 PM

## 2024-01-22 NOTE — ANESTHESIA PROCEDURE NOTES
Airway       Patient location during procedure: OR       Procedure Start/Stop Times: 1/22/2024 8:04 AM  Staff -        Anesthesiologist:  Saravanan Ambrose MD       Resident/Fellow: Jakob Vang MD       Performed By: with residents and resident       Procedure performed by resident/fellow/CRNA in presence of a teaching physician.  Indications and Patient Condition       Indications for airway management: lynette-procedural       Induction type:intravenous       Mask difficulty assessment: 1 - vent by mask    Final Airway Details       Final airway type: endotracheal airway       Successful airway: ETT - single  Endotracheal Airway Details        ETT size (mm): 7.0       Cuffed: yes       Successful intubation technique: direct laryngoscopy       DL Blade Type: MAC 4       Grade View of Cords: 1       Adjucts: stylet       Position: Right       Measured from: gums/teeth       Secured at (cm): 23       Bite block used: Soft    Post intubation assessment        Number of attempts at approach: 1       Number of other approaches attempted: 0       Secured with: pink tape and tape       Ease of procedure: easy       Dentition: Intact    Medication(s) Administered   Medication Administration Time: 1/22/2024 8:04 AM

## 2024-01-23 ENCOUNTER — APPOINTMENT (OUTPATIENT)
Dept: CT IMAGING | Facility: CLINIC | Age: 24
End: 2024-01-23
Payer: COMMERCIAL

## 2024-01-23 ENCOUNTER — APPOINTMENT (OUTPATIENT)
Dept: PHYSICAL THERAPY | Facility: CLINIC | Age: 24
End: 2024-01-23
Payer: COMMERCIAL

## 2024-01-23 LAB
ANION GAP SERPL CALCULATED.3IONS-SCNC: 11 MMOL/L (ref 7–15)
BUN SERPL-MCNC: 9.2 MG/DL (ref 6–20)
CALCIUM SERPL-MCNC: 8.7 MG/DL (ref 8.6–10)
CHLORIDE SERPL-SCNC: 106 MMOL/L (ref 98–107)
CREAT SERPL-MCNC: 0.71 MG/DL (ref 0.51–0.95)
DEPRECATED HCO3 PLAS-SCNC: 23 MMOL/L (ref 22–29)
EGFRCR SERPLBLD CKD-EPI 2021: >90 ML/MIN/1.73M2
ERYTHROCYTE [DISTWIDTH] IN BLOOD BY AUTOMATED COUNT: 13.3 % (ref 10–15)
GLUCOSE SERPL-MCNC: 84 MG/DL (ref 70–99)
HCT VFR BLD AUTO: 36.7 % (ref 35–47)
HGB BLD-MCNC: 12.1 G/DL (ref 11.7–15.7)
MCH RBC QN AUTO: 27.7 PG (ref 26.5–33)
MCHC RBC AUTO-ENTMCNC: 33 G/DL (ref 31.5–36.5)
MCV RBC AUTO: 84 FL (ref 78–100)
PLATELET # BLD AUTO: 316 10E3/UL (ref 150–450)
POTASSIUM SERPL-SCNC: 4 MMOL/L (ref 3.4–5.3)
RBC # BLD AUTO: 4.37 10E6/UL (ref 3.8–5.2)
SODIUM SERPL-SCNC: 140 MMOL/L (ref 135–145)
WBC # BLD AUTO: 9.1 10E3/UL (ref 4–11)

## 2024-01-23 PROCEDURE — 85027 COMPLETE CBC AUTOMATED: CPT

## 2024-01-23 PROCEDURE — 250N000011 HC RX IP 250 OP 636

## 2024-01-23 PROCEDURE — 120N000002 HC R&B MED SURG/OB UMMC

## 2024-01-23 PROCEDURE — 97530 THERAPEUTIC ACTIVITIES: CPT | Mod: GP | Performed by: PHYSICAL THERAPIST

## 2024-01-23 PROCEDURE — 80048 BASIC METABOLIC PNL TOTAL CA: CPT

## 2024-01-23 PROCEDURE — 97116 GAIT TRAINING THERAPY: CPT | Mod: GP | Performed by: PHYSICAL THERAPIST

## 2024-01-23 PROCEDURE — 36415 COLL VENOUS BLD VENIPUNCTURE: CPT

## 2024-01-23 PROCEDURE — 999N000111 HC STATISTIC OT IP EVAL DEFER: Performed by: OCCUPATIONAL THERAPIST

## 2024-01-23 PROCEDURE — 70450 CT HEAD/BRAIN W/O DYE: CPT

## 2024-01-23 PROCEDURE — 250N000013 HC RX MED GY IP 250 OP 250 PS 637

## 2024-01-23 PROCEDURE — 70450 CT HEAD/BRAIN W/O DYE: CPT | Mod: 26 | Performed by: RADIOLOGY

## 2024-01-23 PROCEDURE — 97161 PT EVAL LOW COMPLEX 20 MIN: CPT | Mod: GP | Performed by: PHYSICAL THERAPIST

## 2024-01-23 RX ORDER — HYDROXYZINE HYDROCHLORIDE 25 MG/1
25 TABLET, FILM COATED ORAL 4 TIMES DAILY PRN
Status: DISCONTINUED | OUTPATIENT
Start: 2024-01-23 | End: 2024-01-24 | Stop reason: HOSPADM

## 2024-01-23 RX ORDER — CYCLOBENZAPRINE HCL 10 MG
10 TABLET ORAL 3 TIMES DAILY
Status: DISCONTINUED | OUTPATIENT
Start: 2024-01-23 | End: 2024-01-24 | Stop reason: HOSPADM

## 2024-01-23 RX ORDER — AMOXICILLIN 250 MG
1 CAPSULE ORAL 2 TIMES DAILY
Qty: 30 TABLET | Refills: 0 | Status: ON HOLD | OUTPATIENT
Start: 2024-01-23 | End: 2024-01-31

## 2024-01-23 RX ORDER — CYCLOBENZAPRINE HCL 10 MG
10 TABLET ORAL 3 TIMES DAILY
Status: DISCONTINUED | OUTPATIENT
Start: 2024-01-23 | End: 2024-01-23

## 2024-01-23 RX ORDER — POLYETHYLENE GLYCOL 3350 17 G/17G
17 POWDER, FOR SOLUTION ORAL DAILY
Qty: 510 G | Refills: 0 | Status: SHIPPED | OUTPATIENT
Start: 2024-01-23

## 2024-01-23 RX ORDER — OXYCODONE HYDROCHLORIDE 5 MG/1
5 TABLET ORAL EVERY 4 HOURS PRN
Qty: 25 TABLET | Refills: 0 | Status: ON HOLD | OUTPATIENT
Start: 2024-01-23 | End: 2024-02-09

## 2024-01-23 RX ADMIN — POLYETHYLENE GLYCOL 3350 17 G: 17 POWDER, FOR SOLUTION ORAL at 07:45

## 2024-01-23 RX ADMIN — OXYCODONE HYDROCHLORIDE 5 MG: 5 TABLET ORAL at 00:30

## 2024-01-23 RX ADMIN — OXYCODONE HYDROCHLORIDE 10 MG: 10 TABLET ORAL at 13:27

## 2024-01-23 RX ADMIN — CYCLOBENZAPRINE 10 MG: 10 TABLET, FILM COATED ORAL at 10:34

## 2024-01-23 RX ADMIN — ACETAMINOPHEN 975 MG: 325 TABLET, FILM COATED ORAL at 13:30

## 2024-01-23 RX ADMIN — SERTRALINE HYDROCHLORIDE 75 MG: 50 TABLET ORAL at 20:54

## 2024-01-23 RX ADMIN — CYCLOBENZAPRINE 10 MG: 10 TABLET, FILM COATED ORAL at 18:37

## 2024-01-23 RX ADMIN — SENNOSIDES AND DOCUSATE SODIUM 1 TABLET: 8.6; 5 TABLET ORAL at 20:55

## 2024-01-23 RX ADMIN — Medication 12.5 MG: at 07:45

## 2024-01-23 RX ADMIN — CEFAZOLIN SODIUM 2 G: 2 INJECTION, SOLUTION INTRAVENOUS at 03:28

## 2024-01-23 RX ADMIN — OXYCODONE HYDROCHLORIDE 10 MG: 10 TABLET ORAL at 07:59

## 2024-01-23 RX ADMIN — OXYCODONE HYDROCHLORIDE 10 MG: 10 TABLET ORAL at 21:51

## 2024-01-23 RX ADMIN — ACETAMINOPHEN 975 MG: 325 TABLET, FILM COATED ORAL at 20:55

## 2024-01-23 RX ADMIN — ACETAMINOPHEN 975 MG: 325 TABLET, FILM COATED ORAL at 06:16

## 2024-01-23 RX ADMIN — HYDROXYZINE HYDROCHLORIDE 25 MG: 25 TABLET, FILM COATED ORAL at 20:55

## 2024-01-23 RX ADMIN — CEFAZOLIN SODIUM 2 G: 2 INJECTION, SOLUTION INTRAVENOUS at 11:44

## 2024-01-23 RX ADMIN — OMEPRAZOLE 20 MG: 20 CAPSULE, DELAYED RELEASE ORAL at 20:55

## 2024-01-23 RX ADMIN — SENNOSIDES AND DOCUSATE SODIUM 1 TABLET: 8.6; 5 TABLET ORAL at 07:45

## 2024-01-23 RX ADMIN — OXYCODONE HYDROCHLORIDE 10 MG: 10 TABLET ORAL at 17:38

## 2024-01-23 RX ADMIN — Medication 150 MG: at 20:58

## 2024-01-23 RX ADMIN — Medication 12.5 MG: at 20:55

## 2024-01-23 ASSESSMENT — ACTIVITIES OF DAILY LIVING (ADL)
ADLS_ACUITY_SCORE: 20

## 2024-01-23 NOTE — PROGRESS NOTES
"[facility], [place]   Neurosurgery Progress Note:    Date of service: 1/23/2024    Assessment: Leigh Sloan is a 23 year old female  s/p  shunt revision on 1/22/24 with Dr. Cabrera, new Certas valve at setting of 4. She has been clinically stable.    Clinically Significant Risk Factors Present on Admission            # Severe Obesity: Estimated body mass index is 43.03 kg/m  as calculated from the following:    Height as of this encounter: 1.651 m (5' 5\").    Weight as of this encounter: 117.3 kg (258 lb 9.6 oz).        Plan:  - Neuro checks/vital signs: q4 hour  - SBP: <160  - Pain control  - Activity: as tolerated  - Diet: Regular  -  ABX:  Ancef x 3 doses - completed  - Imaging:  CT head - stable  - PT/OT: recommending home with assist  - Likely discharge tomorrow        Yesica Stevens PA-C  Neurosurgery Department  Pager: [phone]      I have spent a total of 45 minutes total time counseling, coordination, and chart review, over 50% of the time was spent in direct patient care.       Interval History:  CT head obtained and showed stable small IVH. Patient reporting a lot of pain, including muscular pain of neck and abdominal pain. She also endorses a lot of anxiety. Stated she would like to stay overnight to work on pain control. Ordered PTA atarax and scheduled flexeril.           Objective:   Temp:  [98.2  F (36.8  C)-98.5  F (36.9  C)] 98.3  F (36.8  C)  Pulse:  [76-97] 86  Resp:  [5-25] 16  BP: ()/(50-93) 90/52  SpO2:  [92 %-98 %] 96 %  I/O last 3 completed shifts:  In: 3340 [P.O.:720; I.V.:2620]  Out: 800 [Urine:800]    Gen: Appears comfortable, NAD  Wound: Incision, clean, dry, intact without strikethrough  Neurologic:  - Alert & Oriented to person, place, time, and situation  - Follows commands briskly  - Speech fluent, spontaneous. No aphasia or dysarthria.  - No gaze preference. No apparent hemineglect.  - PERRL, EOMI  - Strong eye closure, jaw clench, " and cheek puff  - Face symmetric with sensation intact to light touch  - Trapezii and sternocleidomastoid muscles 5/5 bilaterally  - No pronator drift     Del Tr Bi WE WF Gr   R 5 5 5 5 5 5   L 5 5 5 5 5 5    HF KE KF DF PF EHL   R 5 5 5 5 5 5   L 5 5 5 5 5 5       Sensation intact and symmetric to light touch throughout    LABS  Recent Labs   Lab Test 01/23/24  0638 01/22/24  0649 01/10/24  1800   WBC 9.1 9.3 14.5*   HGB 12.1 12.1 13.0   MCV 84 82 80    296 438       Recent Labs   Lab Test 01/23/24  0638 01/22/24  0649 01/10/24  1800    137 136   POTASSIUM 4.0 3.9 4.1   CHLORIDE 106 104 102   CO2 23 19* 20*   BUN 9.2 14.7 17.5   CR 0.71 0.79 0.72   ANIONGAP 11 14 14   KEV 8.7 9.4 9.2   GLC 84 101* 92       IMAGING    Recent Results (from the past 24 hour(s))   CT Head w/o Contrast    Narrative    CT HEAD W/O CONTRAST 1/23/2024 9:53 AM    History: S/p shunt revision with IVH     Comparison: 1/22/2024    Technique: Using multidetector thin collimation helical acquisition  technique, axial, coronal and sagittal CT images from the skull base  to the vertex were obtained without intravenous contrast.   (topogram) image(s) also obtained and reviewed.    Findings: Right posterior parietal approach ventriculostomy with trace  scattered pneumocephalus in the right cerebral hemisphere with tip in  similar position in the mid right lateral ventricle. Redistribution of  the intraventricular hemorrhage in the right lateral ventricle with  more caudal extension through the foramen of Hernandez. Redistribution of  pneumocephalus overlying the right cerebral hemisphere and right  lateral ventricle.    There is no mass effect, or midline shift. Gray/white matter  differentiation in both cerebral hemispheres is preserved. Ventricles  are proportionate to the cerebral sulci. The basal cisterns are clear.  The visualized portions of the paranasal sinuses and mastoid air cells  are clear.       Impression     Impression:  1. Redistribution of the intraventricular hemorrhage in the right  lateral ventricle without interval enlargement.   2. Stable position of the right parietal approach ventriculostomy with  redistributed mild pneumocephalus along the right cerebral hemisphere  and right lateral ventricle.    I have personally reviewed the examination and initial interpretation  and I agree with the findings.    FELICIA JACKSON MD         SYSTEM ID:  [id number]

## 2024-01-23 NOTE — PHARMACY-ADMISSION MEDICATION HISTORY
Pharmacist Admission Medication History    Admission medication history is complete. The information provided in this note is only as accurate as the sources available at the time of the update.    Information Source(s): Patient, Hospital records, and [facility] via in-person    Pertinent Information: Patient was able to state last time of all prescription and over the counter medications, as well as confirmed instructions for use for all medications.     Of note - oxycodone and miralax on list below is listed for discharge medication and not a part of the patients home medication list prior to admission    Changes made to PTA medication list:  Added: None  Deleted: duplicate Zofran  Changed: None    Allergies reviewed with patient and updates made in EHR: yes    Medication History Completed By: Marshal Gee [facility] 1/23/2024 10:21 AM    PTA Med List   Medication Sig Last Dose    budesonide-formoterol (SYMBICORT) 80-4.5 MCG/ACT Inhaler Inhale 2 puffs into the lungs as needed 1/21/2024 at PM    cetirizine (ZYRTEC) 10 MG tablet Take 10 mg by mouth as needed Past Week    cyclobenzaprine (FLEXERIL) 10 MG tablet Take 10 mg by mouth 3 times daily as needed More than a month    drospirenone-ethinyl estradiol (SAMMY) 3-0.02 MG tablet Take 1 tablet by mouth at bedtime 1/21/2024    hydrOXYzine (VISTARIL) 25 MG capsule Take 25 mg by mouth as needed Past Month    meclizine (ANTIVERT) 25 MG tablet Take 12.5-25 mg by mouth 3 times daily as needed More than a month    meloxicam (MOBIC) 15 MG tablet Take 15 mg by mouth at bedtime 1/12/2024    metoclopramide (REGLAN) 10 MG tablet Take 10 mg by mouth as needed More than a month    metoprolol succinate ER (TOPROL XL) 50 MG 24 hr tablet Take 1 tablet by mouth at bedtime 1/21/2024    omeprazole (PRILOSEC) 20 MG DR capsule Take 20 mg by mouth at bedtime 1/21/2024    ondansetron (ZOFRAN ODT) 4 MG ODT tab Dissolve 1 tablet in mouth every eight hours as needed for nausea or  vomiting for up to 7 days. Past Week    oxyCODONE (ROXICODONE) 5 MG tablet Take 1 tablet (5 mg) by mouth every 4 hours as needed for moderate pain     polyethylene glycol (MIRALAX) 17 GM/Dose powder Take 17 g by mouth daily     prazosin (MINIPRESS) 2 MG capsule Take 1 capsule by mouth at bedtime 1/21/2024    QUEtiapine Fumarate 150 MG TABS Take 1 tablet by mouth at bedtime 1/21/2024    senna-docusate (SENOKOT-S/PERICOLACE) 8.6-50 MG tablet Take 1 tablet by mouth 2 times daily     sertraline (ZOLOFT) 25 MG tablet Take 75 mg by mouth at bedtime 1/21/2024

## 2024-01-23 NOTE — PLAN OF CARE
Arrived from: PACU at 1845  Belongings/meds: Glasses, phone, red duffle bag, [employer] [place] folder with advanced directive inside.   2 RN Skin Assessment Completed by: Haresh HERNÁNDEZ and Gricelda BURRELL    Non-intact findings documented (yes/no/NA): R abdominal bruising, R thigh scratch, R posterior head incision.     Status: S/p stealth assisted  shunt revision and replacement of R sided catheter and shunt valve.   Vitals: VSS   Neuros: Intact   IV: PIV infusing NS @ 100 mL/hr  Labs/Electrolytes: WNL   Resp/trach: LSC on RA   Diet: Regular diet, tolerating well   Bowel status: LBM PTA, BS+  : Voiding spontaneously to bathroom   Skin: See above   Pain: Headache pain managed with PRN oxycodone, scheduled tylenol, and ice packs   Activity: SBA with GB   Social: Family visited this shift, supportive   Plan/Updates this shift: Continue with POC.

## 2024-01-23 NOTE — PROGRESS NOTES
01/23/24 0857   Appointment Info   Signing Clinician's Name / Credentials (PT) Ese Karimi, PT, DPT   Living Environment   People in Home other relative(s)  (aunt)   Current Living Arrangements apartment   Home Accessibility stairs to enter home;stairs within home   Number of Stairs, Main Entrance 2   Stair Railings, Main Entrance none   Number of Stairs, Within Home, Primary six  (railing)   Living Environment Comments reports having support from aunt for assist upon discharge. Pt's father will also be able to assist with getting pt settled at home.   Self-Care   Usual Activity Tolerance good   Current Activity Tolerance moderate   Equipment Currently Used at Home none  (walker available for home use)   Fall history within last six months no   Activity/Exercise/Self-Care Comment IND mobility, ADLs at baseline   General Information   Onset of Illness/Injury or Date of Surgery 01/22/24   Referring Physician Yesica Stevens PA-C   Patient/Family Therapy Goals Statement (PT) Return home.   Pertinent History of Current Problem (include personal factors and/or comorbidities that impact the POC) Pt s/p stealth assisted  shunt revision and replacement of R sided catheter and shunt valve.   Existing Precautions/Restrictions fall  (craniotomy)   General Observations activity orders: up with assist   Pain Assessment   Patient Currently in Pain Yes, see Vital Sign flowsheet  (soreness to torso post-op; RN aware)   Posture    Posture Comments slightly reduced initial upright posture; pt can self-correct   Range of Motion (ROM)   ROM Comment no significant concerns noted; WFL; can perform figure 4 dressing position with some effort and torso discomfort   Strength (Manual Muscle Testing)   Strength Comments slightly reduced functionally; see below   Bed Mobility   Comment, (Bed Mobility) SBA supine<>sit   Transfers   Comment, (Transfers) CGA-SBA sit<>stand   Gait/Stairs (Locomotion)   Comment, (Gait/Stairs)  CGA-SBA, gait slowed and pt slightly unsteady no AD but able to self-correct; notes she has walker available for home use; navigates stairs slowly, CGA   Balance   Balance Comments slightly reduced; see gait   Clinical Impression   Criteria for Skilled Therapeutic Intervention Yes, treatment indicated   PT Diagnosis (PT) impaired mobility   Influenced by the following impairments fatigue, pain, reduced activity tolerance, balance,   Functional limitations due to impairments below baseline bed mobility, transfers, gait   Clinical Presentation (PT Evaluation Complexity) evolving   Clinical Presentation Rationale clinical judgement, [facility]   Clinical Decision Making (Complexity) low complexity   Planned Therapy Interventions (PT) balance training;bed mobility training;gait training;home exercise program;neuromuscular re-education;patient/family education;postural re-education;ROM (range of motion);stair training;strengthening;stretching;transfer training;risk factor education;home program guidelines;progressive activity/exercise   Risk & Benefits of therapy have been explained evaluation/treatment results reviewed;care plan/treatment goals reviewed;risks/benefits reviewed;current/potential barriers reviewed;participants voiced agreement with care plan;participants included;patient   PT Total Evaluation Time   PT Eval, Low Complexity Minutes (72986) 6   Physical Therapy Goals   PT Frequency Daily   PT Predicted Duration/Target Date for Goal Attainment 01/30/24   PT Goals Bed Mobility;Transfers;Gait;Stairs   PT: Bed Mobility Independent;Supine to/from sit;Rolling;Bridging;Within precautions   PT: Transfers Independent;Sit to/from stand;Bed to/from chair;Assistive device;Within precautions   PT: Gait Modified independent;Independent;150 feet;Within precautions   PT: Stairs 6 stairs;Modified independent;Supervision/stand-by assist   PT Discharge Planning   PT Plan if pt not discharged, work further on gait/stairs, pt recall  of precautions   PT Discharge Recommendation (DC Rec) home with assist   PT Rationale for DC Rec Pt has good support, showed ability to transfer, ambulate and navigate stairs with assist, which pt will have. Will encourage pt to use walker at home for added safety.   PT Brief overview of current status Ax1 for transfers, gait; have pt utilize FWW for added stability   Total Session Time   Total Session Time (sum of timed and untimed services) 6

## 2024-01-23 NOTE — PLAN OF CARE
Status: Pt on 6A s/p stealth-guided R-sided  shunt exploration and replacement or R-sided proximal ventricular catheter and shunt valve.  Vitals: VSS on RA ex soft BP. Cont pulse ox in place.   Neuros: Intact.   IV: PIV SL.   Resp/trach: LS clear. Denies SOB.   Diet: Regular.   Bowel status: BS+. LBM PTA.   : Voids spontaneously without difficulty.   Skin: R posterior head incision sutured and ROSIE.   Pain: C/o severe HA pain and chest/abdomen muscular pain. Scheduled Tylenol and PRN oxy and Flexeril given, along with hot and cold packs, partially effective. Flexeril changed to scheduled doses.  Activity: Standby assist.   Social: Parents at bedside, supportive of pt.   Plan: Discharge home with family tomorrow.      Goal Outcome Evaluation:    Plan of Care Reviewed With: patient, parent  Overall Patient Progress: no change  Outcome Evaluation: Discharge canceled d/t pain, plan for discharge tomorrow.

## 2024-01-23 NOTE — DISCHARGE SUMMARY
"[facility] Discharge Summary and Instructions    Leigh Sloan MRN# [id number]   Age: 23 year old YOB: 2000     Date of Admission:  1/22/2024  Date of Discharge::  1/23/2024  Admitting Physician:  Calvin Cabrera MD  Discharge Physician:  Calvin Cabrera MD          Admission Diagnoses:   IIH (idiopathic intracranial hypertension) [G93.2]  S/P  shunt [Z98.2]          Discharge Diagnosis:     IIH (idiopathic intracranial hypertension) [G93.2]  S/P  shunt [Z98.2]     Clinically Significant Risk Factors Present on Admission            # Severe Obesity: Estimated body mass index is 43.03 kg/m  as calculated from the following:    Height as of this encounter: 1.651 m (5' 5\").    Weight as of this encounter: 117.3 kg (258 lb 9.6 oz).  # Asthma  # Non-alcoholic fatty liver disease         Procedures:   1/22/24  1. Stealth assisted revision of proximal catheter of ventriculoperitoneal shunt, right occipital  2. Replacement of Hakim shunt valve with Codman certas valve           Brief History of Illness:   Ms. Sloan is a 23 year-old female with history of idiopathic intracranial hypertension s/p right occipital ventriculoperitoneal shunt (Codman Hakim 120) placement in 2020 in [place] for headaches, dizziness, vision issues with papilledema. Despite the shunt, she continued to have headaches, dizziness, tinnitus and short lived blurriness in vision. She was found to have shunted ventricles but had elevated ICP on lumbar puncture and recurrent papilledema on ophthalmologic evaluation. She did not have any symptoms concerning for infection. She has a sulfa allergy so diamox was not used in the past. Multiple attempts to reprogram shunt valve were not successful. After extensive discussion in the clinic, it was decided to pursue shunt exploration with possible revision and replacement  Patient has elected to undergo above-mentioned procedure.           Hospital Course:   Patient " underwent above-mentioned procedure on 1/22/24.  Following surgery the patient was monitored overnight on the general neuro floor where she remained medically stable.  Postoperative head CT and x-rays shunt series revealed proper positioning of catheter and small IVH of right lateral ventricle. Bleed stable on repeat imaging.  Following surgery the patient complained of mild incisional pain however was tolerating diet, voiding without a Beal, and ambulating safely.   Home medications were restarted prior to discharge.  Patient was given 3 doses of IV antibiotic for wound prophylaxis and completed doses prior to discharge.  Patient was able to discharge home with family on postoperative day #1.  Patient will follow-up in 2 weeks for wound evaluation.  Discharge instructions were discussed with the patient who stated understanding.    Narrative & Impression   CT HEAD W/O CONTRAST 1/23/2024 9:53 AM     History: S/p shunt revision with IVH      Comparison: 1/22/2024     Technique: Using multidetector thin collimation helical acquisition  technique, axial, coronal and sagittal CT images from the skull base  to the vertex were obtained without intravenous contrast.   (topogram) image(s) also obtained and reviewed.     Findings: Right posterior parietal approach ventriculostomy with trace  scattered pneumocephalus in the right cerebral hemisphere with tip in  similar position in the mid right lateral ventricle. Redistribution of  the intraventricular hemorrhage in the right lateral ventricle with  more caudal extension through the foramen of Hernandez. Redistribution of  pneumocephalus overlying the right cerebral hemisphere and right  lateral ventricle.     There is no mass effect, or midline shift. Gray/white matter  differentiation in both cerebral hemispheres is preserved. Ventricles  are proportionate to the cerebral sulci. The basal cisterns are clear.  The visualized portions of the paranasal sinuses and mastoid  air cells  are clear.                                                                       Impression:  1. Redistribution of the intraventricular hemorrhage in the right  lateral ventricle without interval enlargement.   2. Stable position of the right parietal approach ventriculostomy with  redistributed mild pneumocephalus along the right cerebral hemisphere  and right lateral ventricle.     I have personally reviewed the examination and initial interpretation  and I agree with the findings.     FELICIA JACKSON MD         SYSTEM ID:  [id number]             Discharge Medications:     Current Discharge Medication List        START taking these medications    Details   oxyCODONE (ROXICODONE) 5 MG tablet Take 1 tablet (5 mg) by mouth every 4 hours as needed for moderate pain  Qty: 25 tablet, Refills: 0    Associated Diagnoses: S/P ventriculoperitoneal shunt      polyethylene glycol (MIRALAX) 17 GM/Dose powder Take 17 g by mouth daily  Qty: 510 g, Refills: 0    Associated Diagnoses: S/P ventriculoperitoneal shunt      senna-docusate (SENOKOT-S/PERICOLACE) 8.6-50 MG tablet Take 1 tablet by mouth 2 times daily  Qty: 30 tablet, Refills: 0    Associated Diagnoses: S/P ventriculoperitoneal shunt           CONTINUE these medications which have NOT CHANGED    Details   budesonide-formoterol (SYMBICORT) 80-4.5 MCG/ACT Inhaler Inhale 2 puffs into the lungs as needed      cetirizine (ZYRTEC) 10 MG tablet Take 10 mg by mouth as needed      cyclobenzaprine (FLEXERIL) 10 MG tablet Take 10 mg by mouth 3 times daily as needed      drospirenone-ethinyl estradiol (SAMMY) 3-0.02 MG tablet Take 1 tablet by mouth at bedtime      hydrOXYzine (VISTARIL) 25 MG capsule Take 25 mg by mouth as needed      meclizine (ANTIVERT) 25 MG tablet Take 12.5-25 mg by mouth 3 times daily as needed      meloxicam (MOBIC) 15 MG tablet Take 15 mg by mouth at bedtime      metoclopramide (REGLAN) 10 MG tablet Take 10 mg by mouth as needed      metoprolol succinate  "ER (TOPROL XL) 50 MG 24 hr tablet Take 1 tablet by mouth at bedtime      omeprazole (PRILOSEC) 20 MG DR capsule Take 20 mg by mouth at bedtime      ondansetron (ZOFRAN ODT) 4 MG ODT tab Dissolve 1 tablet in mouth every eight hours as needed for nausea or vomiting for up to 7 days.      ondansetron (ZOFRAN) 4 MG tablet Take 4 mg by mouth every 6 hours as needed      prazosin (MINIPRESS) 2 MG capsule Take 1 capsule by mouth at bedtime      QUEtiapine Fumarate 150 MG TABS Take 1 tablet by mouth at bedtime      sertraline (ZOLOFT) 25 MG tablet Take 75 mg by mouth at bedtime                    Exam:   Physical Exam  /50 (BP Location: Right arm)   Pulse 86   Temp 98.3  F (36.8  C) (Oral)   Resp 18   Ht 1.651 m (5' 5\")   Wt 117.3 kg (258 lb 9.6 oz)   SpO2 97%   BMI 43.03 kg/m    General: Appears comfortable, NAD  Wound: Incision, clean, dry, intact without strikethrough  Neurologic Exam:  - AOx3.  - Follows commands.  - Speech fluent, spontaneous. No aphasia or dysarthria.  - No gaze preference. No apparent hemineglect.  - PERRL, EOMI.  - Face symmetric with sensation intact to light touch.  - Palate elevates symmetrically, uvula midline, tongue protrudes midline.  - Trapezii muscles 5/5 bilaterally.  - No pronator drift.  Motor: Normal bulk/tone; no tremor, rigidity, or bradykinesia.   Right:  Deltoid 5/5, tricep 5/5, bicep 5/5, wrist flexor 5/5, wrist extensor 5/5, finger intrinsic 5/5  Left:  Deltoid 5/5, tricep 5/5, bicep 5/5, wrist flexor 5/5, wrist extensor 5/5, finger intrinsic 5/5  Right: Iliopsoas 5/5, quadricep 5/5, hamstring 5/5, tibialis anterior 5/5, gastrocnemius 5/5, EHL 5/5  Left:  Iliopsoas 5/5, quadricep 5/5, hamstring 5/5, tibialis anterior 5/5, gastrocnemius 5/5, EHL 5/5    Sensation intact in bilateral L4-S1 dermatones            Discharge Instructions and Follow-Up:     Discharge diet: Regular   Discharge activity: You may advance activity as tolerated. No strenuous exercise or heay " lifting greater than 10 lbs for 4 weeks or until seen and cleared in clinic.     Discharge follow-up: Follow-up with Neurosurgery [facility] in 2 weeks for wound check/post-hospital evaluation with head CT prior to this appointment.         Wound care: Ok to shower,however no scrubbing of the wound and no soaking of the wound, meaning no bathtubs or swimming pools. Pat dry only. Leave wound open to air.  Patient to have wound checked 2 weeks following surgery.    Wound location: Scalp  Closure technique: Suture  Dressing needs: None  Post-op care at follow-up: Keep dry and clean         Please call if you have:  1. increased pain, redness, drainage, swelling at your incision  2. fevers > 101.5 F degrees  3. with any questions or concerns.  You may reach the Neurosurgery clinic at [phone] during regular work hours. ER at [phone].    and ask for the Neurosurgery Resident on call at [phone], during off hours or weekends.         Discharge Disposition:     Discharged to home        Yesica Stevens PA-C  Neurosurgery Department  Pager: [phone]

## 2024-01-23 NOTE — PROGRESS NOTES
HEALTH CARE DIRECTIVE is in the chart.    Leigh Clark, BSN, RN, PHN, CNRN  Evening Charge Nurse Unit 6A  [phone]

## 2024-01-23 NOTE — PLAN OF CARE
Status: S/p stealth guided R sided  shunt exploration and replacement of R sided proximal ventricular catheter and shunt valve.   Vitals: VSS on RA. Continuous pulse ox in place.   Neuros: Intact  IV: PIV SL  Resp/trach: WNL  Diet: Regular  Bowel status: LBM PTA  : Voiding spontaneously   Skin: R posterior head incision covered with primapore, marked with no extension.   Pain: Moderate HA, managed with PRN oxycodone, scheduled Tylenol, and ice packs.   Activity: SBA  Plan: Continue with current POC.

## 2024-01-23 NOTE — PROGRESS NOTES
Occupational Therapy: Defer - Orders received. Chart reviewed and discussed with care team.?Occupational Therapy not indicated.?Per discussion with PT, patient is completing ADLs SBA in room, following precautions, and appropriate for single rehab discipline to follow. No IP OT needs identified. PT to continue to follow. Defer discharge recommendations to PT and medical team.?Will complete orders.

## 2024-01-24 VITALS
OXYGEN SATURATION: 97 % | HEIGHT: 65 IN | RESPIRATION RATE: 16 BRPM | HEART RATE: 65 BPM | TEMPERATURE: 98.3 F | BODY MASS INDEX: 43.09 KG/M2 | SYSTOLIC BLOOD PRESSURE: 99 MMHG | WEIGHT: 258.6 LBS | DIASTOLIC BLOOD PRESSURE: 57 MMHG

## 2024-01-24 LAB — GLUCOSE BLDC GLUCOMTR-MCNC: 85 MG/DL (ref 70–99)

## 2024-01-24 PROCEDURE — 97530 THERAPEUTIC ACTIVITIES: CPT | Mod: GP | Performed by: PHYSICAL THERAPIST

## 2024-01-24 PROCEDURE — 250N000013 HC RX MED GY IP 250 OP 250 PS 637

## 2024-01-24 RX ADMIN — CYCLOBENZAPRINE 10 MG: 10 TABLET, FILM COATED ORAL at 07:57

## 2024-01-24 RX ADMIN — OXYCODONE HYDROCHLORIDE 5 MG: 5 TABLET ORAL at 10:31

## 2024-01-24 RX ADMIN — OXYCODONE HYDROCHLORIDE 10 MG: 10 TABLET ORAL at 02:12

## 2024-01-24 RX ADMIN — POLYETHYLENE GLYCOL 3350 17 G: 17 POWDER, FOR SOLUTION ORAL at 07:57

## 2024-01-24 RX ADMIN — OXYCODONE HYDROCHLORIDE 5 MG: 5 TABLET ORAL at 06:42

## 2024-01-24 RX ADMIN — ACETAMINOPHEN 975 MG: 325 TABLET, FILM COATED ORAL at 05:53

## 2024-01-24 RX ADMIN — SENNOSIDES AND DOCUSATE SODIUM 1 TABLET: 8.6; 5 TABLET ORAL at 07:57

## 2024-01-24 RX ADMIN — Medication 12.5 MG: at 07:57

## 2024-01-24 ASSESSMENT — ACTIVITIES OF DAILY LIVING (ADL)
ADLS_ACUITY_SCORE: 20

## 2024-01-24 NOTE — PLAN OF CARE
Status: Pt s/p stealth-guided R-sided  shunt exploration and replacement or R-sided proximal ventricular catheter and shunt valve.  Vitals: VSS on RA. Continuous pulse ox   Neuros: Intact   IV: PIV SL.   Resp/trach: LSC  Diet: Regular  Bowel status: BMx1   : Voiding spontaneously  Skin: R posterior head incision WDL    Pain: Head/abdominal muscular pain managed with scheduled tylenol and flexeril, PRN oxycodone. Ice packs applied. PRN atarax given for anxiety.   Activity: SBA/GB   Social: No visitors this shift  Plan: Discharge home with family tomorrow

## 2024-01-24 NOTE — PLAN OF CARE
Goal Outcome Evaluation:      Plan of Care Reviewed With: patient    Overall Patient Progress: improvingOverall Patient Progress: improving    Status: Pt s/p stealth-guided R-sided  shunt exploration and replacement or R-sided proximal ventricular catheter and shunt valve.   Vitals: VSS on RA, on Cont Pulse OX  Neuros: A&Ox4, denies N/T, strengths 5/5 t/0  IV: PIV SL  Resp/trach: On RA  Diet: Regular  Bowel status: LBM 01/23/24  : voided to BR x1 this shift  Skin: R  posterior head incision, ROSIE  Pain: managed with PRN 10 mg oxycodone x1, scheduled tylenol, cold packs  Activity: SBA/ GB  Social: no visitors  Plan: Possible discharge today  Updates this shift: pain management done this shift.

## 2024-01-27 ENCOUNTER — TELEPHONE (OUTPATIENT)
Dept: NEUROLOGY | Facility: CLINIC | Age: 24
End: 2024-01-27
Payer: COMMERCIAL

## 2024-01-27 LAB
BACTERIA CSF CULT: NO GROWTH
GRAM STAIN RESULT: NORMAL

## 2024-01-28 NOTE — TELEPHONE ENCOUNTER
[facility], [place]     Neurosurgery Telephone Progress Note:  01/27/2024          Encounter:   Ms. Sloan is a 23 year-old female with history of idiopathic intracranial hypertension s/p right occipital ventriculoperitoneal shunt (Codman Hakim 120) placement in 2020 in [place] for headaches, dizziness, vision issues with papilledema. Despite the shunt, she continued to have headaches, dizziness, tinnitus and short lived blurriness in vision. She was found to have shunted ventricles but had elevated ICP on lumbar puncture and recurrent papilledema on ophthalmologic evaluation.      She underwent shunt exploration and was found to have both proximal catheter and shunt valve malfunction and were replaced on 1/22/2024.  CSF and the valve, catheter were sent for cultures.  Today we received the initial preliminary results that both the valve and the proximal catheter were growing diphtheroids and the broth on day 5.  So I called Leigh to update her on the results and discuss the future plans.    She reported that she was doing well and was having minimal to no headaches and her vision has been stable.  She endorses no recent fevers, chills, nausea, vomiting, chest pain, shortness of breath, and denies headaches, weakness, LOC,numbness/weakness/paresthesias in extremities, changes in sensation, taste, smell, nor trouble speaking or other neurologic symptoms.    Plan:    I discussed the findings of the cultures as well as explained that she might be having this infection for a very long time and which could have contributed to the shunt malfunction. I told that we would watch for the cultures to finalize and will reach out to her to discuss the plan for shunt explantation.  We will hold off on antibiotics at this time and continue to watch her clinically    I emphasized that she should call or come to the hospital if she is having persistent headaches nausea vomiting neck stiffness weakness  numbness or ankles or lethargic so that we can evaluate her and expedite the process    She expressed understanding.    Plan was discussed with Dr. Cabrera  -----------------------------------  Simba Sanders MD  Neurosurgery resident, PGY-2  Pager [phone]    Please contact neurosurgery resident on call with questions.    Dial * * [phone], enter [phone] when prompted.

## 2024-01-29 ENCOUNTER — HOSPITAL ENCOUNTER (INPATIENT)
Facility: CLINIC | Age: 24
LOS: 11 days | Discharge: HOME | End: 2024-02-09
Attending: EMERGENCY MEDICINE | Admitting: NEUROLOGICAL SURGERY
Payer: COMMERCIAL

## 2024-01-29 ENCOUNTER — HOSPITAL ENCOUNTER (INPATIENT)
Facility: CLINIC | Age: 24
Setting detail: SURGERY ADMIT
End: 2024-01-29
Attending: NEUROLOGICAL SURGERY | Admitting: NEUROLOGICAL SURGERY
Payer: COMMERCIAL

## 2024-01-29 ENCOUNTER — ANESTHESIA EVENT (OUTPATIENT)
Dept: SURGERY | Facility: CLINIC | Age: 24
End: 2024-01-29
Payer: COMMERCIAL

## 2024-01-29 ENCOUNTER — ANESTHESIA (OUTPATIENT)
Dept: SURGERY | Facility: CLINIC | Age: 24
End: 2024-01-29
Payer: COMMERCIAL

## 2024-01-29 ENCOUNTER — APPOINTMENT (OUTPATIENT)
Dept: CT IMAGING | Facility: CLINIC | Age: 24
End: 2024-01-29
Payer: COMMERCIAL

## 2024-01-29 DIAGNOSIS — Z98.2 S/P VP SHUNT: ICD-10-CM

## 2024-01-29 DIAGNOSIS — G93.2 IIH (IDIOPATHIC INTRACRANIAL HYPERTENSION): Primary | ICD-10-CM

## 2024-01-29 DIAGNOSIS — T85.730A INFECTION OF VENTRICULOPERITONEAL SHUNT, INITIAL ENCOUNTER (H): ICD-10-CM

## 2024-01-29 LAB
ABO/RH(D): NORMAL
ANION GAP SERPL CALCULATED.3IONS-SCNC: 13 MMOL/L (ref 7–15)
ANTIBODY SCREEN: NEGATIVE
APTT PPP: 23 SECONDS (ref 22–38)
BACTERIA SPEC CULT: ABNORMAL
BACTERIA SPEC CULT: ABNORMAL
BACTERIA SPEC CULT: NORMAL
BASOPHILS # BLD AUTO: 0 10E3/UL (ref 0–0.2)
BASOPHILS NFR BLD AUTO: 1 %
BUN SERPL-MCNC: 12.5 MG/DL (ref 6–20)
CALCIUM SERPL-MCNC: 9.4 MG/DL (ref 8.6–10)
CHLORIDE SERPL-SCNC: 103 MMOL/L (ref 98–107)
CREAT SERPL-MCNC: 0.8 MG/DL (ref 0.51–0.95)
CRP SERPL-MCNC: 15 MG/L
DEPRECATED HCO3 PLAS-SCNC: 22 MMOL/L (ref 22–29)
EGFRCR SERPLBLD CKD-EPI 2021: >90 ML/MIN/1.73M2
EOSINOPHIL # BLD AUTO: 0.1 10E3/UL (ref 0–0.7)
EOSINOPHIL NFR BLD AUTO: 1 %
ERYTHROCYTE [DISTWIDTH] IN BLOOD BY AUTOMATED COUNT: 13.6 % (ref 10–15)
ERYTHROCYTE [SEDIMENTATION RATE] IN BLOOD BY WESTERGREN METHOD: 25 MM/HR (ref 0–20)
ERYTHROCYTE [SEDIMENTATION RATE] IN BLOOD BY WESTERGREN METHOD: 26 MM/HR (ref 0–20)
GLUCOSE SERPL-MCNC: 96 MG/DL (ref 70–99)
HCG SERPL QL: NEGATIVE
HCT VFR BLD AUTO: 37.5 % (ref 35–47)
HGB BLD-MCNC: 12.7 G/DL (ref 11.7–15.7)
IMM GRANULOCYTES # BLD: 0 10E3/UL
IMM GRANULOCYTES NFR BLD: 0 %
INR PPP: 0.97 (ref 0.85–1.15)
LYMPHOCYTES # BLD AUTO: 2.8 10E3/UL (ref 0.8–5.3)
LYMPHOCYTES NFR BLD AUTO: 34 %
MCH RBC QN AUTO: 27.7 PG (ref 26.5–33)
MCHC RBC AUTO-ENTMCNC: 33.9 G/DL (ref 31.5–36.5)
MCV RBC AUTO: 82 FL (ref 78–100)
MONOCYTES # BLD AUTO: 0.4 10E3/UL (ref 0–1.3)
MONOCYTES NFR BLD AUTO: 5 %
NEUTROPHILS # BLD AUTO: 4.9 10E3/UL (ref 1.6–8.3)
NEUTROPHILS NFR BLD AUTO: 59 %
NRBC # BLD AUTO: 0 10E3/UL
NRBC BLD AUTO-RTO: 0 /100
PLATELET # BLD AUTO: 407 10E3/UL (ref 150–450)
POTASSIUM SERPL-SCNC: 4.4 MMOL/L (ref 3.4–5.3)
RBC # BLD AUTO: 4.58 10E6/UL (ref 3.8–5.2)
SODIUM SERPL-SCNC: 138 MMOL/L (ref 135–145)
SPECIMEN EXPIRATION DATE: NORMAL
WBC # BLD AUTO: 8.3 10E3/UL (ref 4–11)

## 2024-01-29 PROCEDURE — 87149 DNA/RNA DIRECT PROBE: CPT

## 2024-01-29 PROCEDURE — 250N000013 HC RX MED GY IP 250 OP 250 PS 637

## 2024-01-29 PROCEDURE — 87076 CULTURE ANAEROBE IDENT EACH: CPT | Performed by: NEUROLOGICAL SURGERY

## 2024-01-29 PROCEDURE — 250N000009 HC RX 250: Performed by: NEUROLOGICAL SURGERY

## 2024-01-29 PROCEDURE — 85610 PROTHROMBIN TIME: CPT | Performed by: EMERGENCY MEDICINE

## 2024-01-29 PROCEDURE — 99285 EMERGENCY DEPT VISIT HI MDM: CPT | Mod: 25 | Performed by: EMERGENCY MEDICINE

## 2024-01-29 PROCEDURE — 70450 CT HEAD/BRAIN W/O DYE: CPT | Mod: 26 | Performed by: RADIOLOGY

## 2024-01-29 PROCEDURE — 96374 THER/PROPH/DIAG INJ IV PUSH: CPT | Performed by: EMERGENCY MEDICINE

## 2024-01-29 PROCEDURE — 62256 REMOVE BRAIN CAVITY SHUNT: CPT | Mod: 78 | Performed by: NEUROLOGICAL SURGERY

## 2024-01-29 PROCEDURE — 250N000011 HC RX IP 250 OP 636

## 2024-01-29 PROCEDURE — 258N000003 HC RX IP 258 OP 636

## 2024-01-29 PROCEDURE — 250N000011 HC RX IP 250 OP 636: Performed by: STUDENT IN AN ORGANIZED HEALTH CARE EDUCATION/TRAINING PROGRAM

## 2024-01-29 PROCEDURE — 85652 RBC SED RATE AUTOMATED: CPT | Performed by: EMERGENCY MEDICINE

## 2024-01-29 PROCEDURE — 250N000009 HC RX 250

## 2024-01-29 PROCEDURE — 99285 EMERGENCY DEPT VISIT HI MDM: CPT | Performed by: EMERGENCY MEDICINE

## 2024-01-29 PROCEDURE — 85730 THROMBOPLASTIN TIME PARTIAL: CPT | Performed by: EMERGENCY MEDICINE

## 2024-01-29 PROCEDURE — 86140 C-REACTIVE PROTEIN: CPT | Performed by: EMERGENCY MEDICINE

## 2024-01-29 PROCEDURE — 00P63JZ REMOVAL OF SYNTHETIC SUBSTITUTE FROM CEREBRAL VENTRICLE, PERCUTANEOUS APPROACH: ICD-10-PCS | Performed by: NEUROLOGICAL SURGERY

## 2024-01-29 PROCEDURE — 710N000010 HC RECOVERY PHASE 1, LEVEL 2, PER MIN: Performed by: NEUROLOGICAL SURGERY

## 2024-01-29 PROCEDURE — 360N000085 HC SURGERY LEVEL 5 W/ FLUORO, PER MIN: Performed by: NEUROLOGICAL SURGERY

## 2024-01-29 PROCEDURE — 85025 COMPLETE CBC W/AUTO DIFF WBC: CPT | Performed by: EMERGENCY MEDICINE

## 2024-01-29 PROCEDURE — 85652 RBC SED RATE AUTOMATED: CPT

## 2024-01-29 PROCEDURE — 258N000003 HC RX IP 258 OP 636: Performed by: EMERGENCY MEDICINE

## 2024-01-29 PROCEDURE — 87186 SC STD MICRODIL/AGAR DIL: CPT

## 2024-01-29 PROCEDURE — 80048 BASIC METABOLIC PNL TOTAL CA: CPT | Performed by: EMERGENCY MEDICINE

## 2024-01-29 PROCEDURE — 70450 CT HEAD/BRAIN W/O DYE: CPT

## 2024-01-29 PROCEDURE — 86900 BLOOD TYPING SEROLOGIC ABO: CPT | Performed by: EMERGENCY MEDICINE

## 2024-01-29 PROCEDURE — 120N000002 HC R&B MED SURG/OB UMMC

## 2024-01-29 PROCEDURE — 250N000011 HC RX IP 250 OP 636: Performed by: EMERGENCY MEDICINE

## 2024-01-29 PROCEDURE — 87040 BLOOD CULTURE FOR BACTERIA: CPT | Performed by: EMERGENCY MEDICINE

## 2024-01-29 PROCEDURE — 36415 COLL VENOUS BLD VENIPUNCTURE: CPT | Performed by: EMERGENCY MEDICINE

## 2024-01-29 PROCEDURE — 36415 COLL VENOUS BLD VENIPUNCTURE: CPT

## 2024-01-29 PROCEDURE — 87070 CULTURE OTHR SPECIMN AEROBIC: CPT | Performed by: NEUROLOGICAL SURGERY

## 2024-01-29 PROCEDURE — 272N000001 HC OR GENERAL SUPPLY STERILE: Performed by: NEUROLOGICAL SURGERY

## 2024-01-29 PROCEDURE — 999N000141 HC STATISTIC PRE-PROCEDURE NURSING ASSESSMENT: Performed by: NEUROLOGICAL SURGERY

## 2024-01-29 PROCEDURE — 250N000025 HC SEVOFLURANE, PER MIN: Performed by: NEUROLOGICAL SURGERY

## 2024-01-29 PROCEDURE — 370N000017 HC ANESTHESIA TECHNICAL FEE, PER MIN: Performed by: NEUROLOGICAL SURGERY

## 2024-01-29 PROCEDURE — 84703 CHORIONIC GONADOTROPIN ASSAY: CPT | Performed by: EMERGENCY MEDICINE

## 2024-01-29 RX ORDER — ONDANSETRON 2 MG/ML
4 INJECTION INTRAMUSCULAR; INTRAVENOUS EVERY 6 HOURS PRN
Status: DISCONTINUED | OUTPATIENT
Start: 2024-01-29 | End: 2024-02-08

## 2024-01-29 RX ORDER — CEFAZOLIN SODIUM/WATER 2 G/20 ML
2 SYRINGE (ML) INTRAVENOUS SEE ADMIN INSTRUCTIONS
Status: DISCONTINUED | OUTPATIENT
Start: 2024-01-29 | End: 2024-01-29

## 2024-01-29 RX ORDER — SODIUM CHLORIDE 9 MG/ML
INJECTION, SOLUTION INTRAVENOUS CONTINUOUS
Status: ACTIVE | OUTPATIENT
Start: 2024-01-29 | End: 2024-01-30

## 2024-01-29 RX ORDER — LIDOCAINE HYDROCHLORIDE 20 MG/ML
INJECTION, SOLUTION INFILTRATION; PERINEURAL PRN
Status: DISCONTINUED | OUTPATIENT
Start: 2024-01-29 | End: 2024-01-29

## 2024-01-29 RX ORDER — ACETAMINOPHEN 325 MG/1
650 TABLET ORAL EVERY 4 HOURS PRN
Status: DISCONTINUED | OUTPATIENT
Start: 2024-02-01 | End: 2024-02-08

## 2024-01-29 RX ORDER — CYCLOBENZAPRINE HCL 10 MG
10 TABLET ORAL 3 TIMES DAILY PRN
Status: DISCONTINUED | OUTPATIENT
Start: 2024-01-29 | End: 2024-02-09 | Stop reason: HOSPADM

## 2024-01-29 RX ORDER — MECLIZINE HCL 12.5 MG 12.5 MG/1
12.5-25 TABLET ORAL 3 TIMES DAILY PRN
Status: DISCONTINUED | OUTPATIENT
Start: 2024-01-29 | End: 2024-02-09 | Stop reason: HOSPADM

## 2024-01-29 RX ORDER — NALOXONE HYDROCHLORIDE 0.4 MG/ML
0.2 INJECTION, SOLUTION INTRAMUSCULAR; INTRAVENOUS; SUBCUTANEOUS
Status: DISCONTINUED | OUTPATIENT
Start: 2024-01-29 | End: 2024-02-09 | Stop reason: HOSPADM

## 2024-01-29 RX ORDER — PROPOFOL 10 MG/ML
INJECTION, EMULSION INTRAVENOUS PRN
Status: DISCONTINUED | OUTPATIENT
Start: 2024-01-29 | End: 2024-01-29

## 2024-01-29 RX ORDER — POLYETHYLENE GLYCOL 3350 17 G/17G
17 POWDER, FOR SOLUTION ORAL DAILY
Status: DISCONTINUED | OUTPATIENT
Start: 2024-01-30 | End: 2024-02-08

## 2024-01-29 RX ORDER — SODIUM CHLORIDE, SODIUM LACTATE, POTASSIUM CHLORIDE, CALCIUM CHLORIDE 600; 310; 30; 20 MG/100ML; MG/100ML; MG/100ML; MG/100ML
INJECTION, SOLUTION INTRAVENOUS CONTINUOUS
Status: CANCELLED | OUTPATIENT
Start: 2024-01-29

## 2024-01-29 RX ORDER — LABETALOL HYDROCHLORIDE 5 MG/ML
10 INJECTION, SOLUTION INTRAVENOUS
Status: DISCONTINUED | OUTPATIENT
Start: 2024-01-29 | End: 2024-01-29 | Stop reason: HOSPADM

## 2024-01-29 RX ORDER — HYDROMORPHONE HYDROCHLORIDE 4 MG/1
4 TABLET ORAL EVERY 4 HOURS PRN
Status: COMPLETED | OUTPATIENT
Start: 2024-01-29 | End: 2024-01-31

## 2024-01-29 RX ORDER — METOPROLOL TARTRATE 25 MG/1
25 TABLET, FILM COATED ORAL 2 TIMES DAILY
Status: DISCONTINUED | OUTPATIENT
Start: 2024-01-30 | End: 2024-02-09 | Stop reason: HOSPADM

## 2024-01-29 RX ORDER — HALOPERIDOL 5 MG/ML
1 INJECTION INTRAMUSCULAR
Status: CANCELLED | OUTPATIENT
Start: 2024-01-29

## 2024-01-29 RX ORDER — PRAZOSIN HYDROCHLORIDE 2 MG/1
2 CAPSULE ORAL AT BEDTIME
Status: DISCONTINUED | OUTPATIENT
Start: 2024-01-29 | End: 2024-02-09 | Stop reason: HOSPADM

## 2024-01-29 RX ORDER — HYDROXYZINE HYDROCHLORIDE 25 MG/1
25 TABLET, FILM COATED ORAL 4 TIMES DAILY PRN
Status: DISCONTINUED | OUTPATIENT
Start: 2024-01-29 | End: 2024-01-30

## 2024-01-29 RX ORDER — PROPOFOL 10 MG/ML
INJECTION, EMULSION INTRAVENOUS CONTINUOUS PRN
Status: DISCONTINUED | OUTPATIENT
Start: 2024-01-29 | End: 2024-01-29

## 2024-01-29 RX ORDER — FENTANYL CITRATE 50 UG/ML
25 INJECTION, SOLUTION INTRAMUSCULAR; INTRAVENOUS EVERY 5 MIN PRN
Status: CANCELLED | OUTPATIENT
Start: 2024-01-29

## 2024-01-29 RX ORDER — ALBUTEROL SULFATE 0.83 MG/ML
2.5 SOLUTION RESPIRATORY (INHALATION) EVERY 4 HOURS PRN
Status: DISCONTINUED | OUTPATIENT
Start: 2024-01-29 | End: 2024-01-29 | Stop reason: HOSPADM

## 2024-01-29 RX ORDER — OXYCODONE HYDROCHLORIDE 5 MG/1
5 TABLET ORAL EVERY 4 HOURS PRN
Status: DISCONTINUED | OUTPATIENT
Start: 2024-01-29 | End: 2024-01-31

## 2024-01-29 RX ORDER — ONDANSETRON 2 MG/ML
INJECTION INTRAMUSCULAR; INTRAVENOUS PRN
Status: DISCONTINUED | OUTPATIENT
Start: 2024-01-29 | End: 2024-01-29

## 2024-01-29 RX ORDER — ALBUTEROL SULFATE 0.83 MG/ML
2.5 SOLUTION RESPIRATORY (INHALATION) EVERY 4 HOURS PRN
Status: CANCELLED | OUTPATIENT
Start: 2024-01-29

## 2024-01-29 RX ORDER — ONDANSETRON 4 MG/1
4 TABLET, ORALLY DISINTEGRATING ORAL EVERY 30 MIN PRN
Status: CANCELLED | OUTPATIENT
Start: 2024-01-29

## 2024-01-29 RX ORDER — HYDRALAZINE HYDROCHLORIDE 20 MG/ML
10-20 INJECTION INTRAMUSCULAR; INTRAVENOUS EVERY 30 MIN PRN
Status: DISCONTINUED | OUTPATIENT
Start: 2024-01-29 | End: 2024-02-08

## 2024-01-29 RX ORDER — HYDROMORPHONE HCL IN WATER/PF 6 MG/30 ML
0.4 PATIENT CONTROLLED ANALGESIA SYRINGE INTRAVENOUS
Status: DISCONTINUED | OUTPATIENT
Start: 2024-01-29 | End: 2024-01-31

## 2024-01-29 RX ORDER — HYDROMORPHONE HCL IN WATER/PF 6 MG/30 ML
0.4 PATIENT CONTROLLED ANALGESIA SYRINGE INTRAVENOUS EVERY 5 MIN PRN
Status: DISCONTINUED | OUTPATIENT
Start: 2024-01-29 | End: 2024-01-29 | Stop reason: HOSPADM

## 2024-01-29 RX ORDER — NALOXONE HYDROCHLORIDE 0.4 MG/ML
0.4 INJECTION, SOLUTION INTRAMUSCULAR; INTRAVENOUS; SUBCUTANEOUS
Status: DISCONTINUED | OUTPATIENT
Start: 2024-01-29 | End: 2024-02-09 | Stop reason: HOSPADM

## 2024-01-29 RX ORDER — FLUTICASONE FUROATE AND VILANTEROL 100; 25 UG/1; UG/1
1 POWDER RESPIRATORY (INHALATION) DAILY
Status: DISCONTINUED | OUTPATIENT
Start: 2024-01-30 | End: 2024-01-29

## 2024-01-29 RX ORDER — FENTANYL CITRATE 50 UG/ML
INJECTION, SOLUTION INTRAMUSCULAR; INTRAVENOUS PRN
Status: DISCONTINUED | OUTPATIENT
Start: 2024-01-29 | End: 2024-01-29

## 2024-01-29 RX ORDER — HYDRALAZINE HYDROCHLORIDE 20 MG/ML
2.5-5 INJECTION INTRAMUSCULAR; INTRAVENOUS EVERY 10 MIN PRN
Status: CANCELLED | OUTPATIENT
Start: 2024-01-29

## 2024-01-29 RX ORDER — PANTOPRAZOLE SODIUM 40 MG/1
40 TABLET, DELAYED RELEASE ORAL
Status: DISCONTINUED | OUTPATIENT
Start: 2024-01-30 | End: 2024-02-09 | Stop reason: HOSPADM

## 2024-01-29 RX ORDER — BUDESONIDE AND FORMOTEROL FUMARATE DIHYDRATE 80; 4.5 UG/1; UG/1
2 AEROSOL RESPIRATORY (INHALATION) 2 TIMES DAILY
Status: DISCONTINUED | OUTPATIENT
Start: 2024-01-29 | End: 2024-02-09 | Stop reason: HOSPADM

## 2024-01-29 RX ORDER — ACETAMINOPHEN 325 MG/1
975 TABLET ORAL EVERY 8 HOURS
Qty: 27 TABLET | Refills: 0 | Status: COMPLETED | OUTPATIENT
Start: 2024-01-30 | End: 2024-02-01

## 2024-01-29 RX ORDER — LABETALOL HYDROCHLORIDE 5 MG/ML
10 INJECTION, SOLUTION INTRAVENOUS
Status: CANCELLED | OUTPATIENT
Start: 2024-01-29

## 2024-01-29 RX ORDER — CEFAZOLIN SODIUM 2 G/100ML
2 INJECTION, SOLUTION INTRAVENOUS EVERY 8 HOURS
Qty: 300 ML | Refills: 0 | Status: COMPLETED | OUTPATIENT
Start: 2024-01-29 | End: 2024-01-30

## 2024-01-29 RX ORDER — LIDOCAINE 40 MG/G
CREAM TOPICAL
Status: DISCONTINUED | OUTPATIENT
Start: 2024-01-29 | End: 2024-02-09 | Stop reason: HOSPADM

## 2024-01-29 RX ORDER — ACETAMINOPHEN 325 MG/1
975 TABLET ORAL ONCE
Status: COMPLETED | OUTPATIENT
Start: 2024-01-29 | End: 2024-01-29

## 2024-01-29 RX ORDER — FENTANYL CITRATE 50 UG/ML
50 INJECTION, SOLUTION INTRAMUSCULAR; INTRAVENOUS EVERY 5 MIN PRN
Status: DISCONTINUED | OUTPATIENT
Start: 2024-01-29 | End: 2024-01-29 | Stop reason: HOSPADM

## 2024-01-29 RX ORDER — ACETAZOLAMIDE 250 MG/1
250 TABLET ORAL
Status: DISCONTINUED | OUTPATIENT
Start: 2024-01-30 | End: 2024-01-29

## 2024-01-29 RX ORDER — ONDANSETRON 4 MG/1
4 TABLET, ORALLY DISINTEGRATING ORAL EVERY 30 MIN PRN
Status: DISCONTINUED | OUTPATIENT
Start: 2024-01-29 | End: 2024-01-29 | Stop reason: HOSPADM

## 2024-01-29 RX ORDER — PROCHLORPERAZINE MALEATE 10 MG
10 TABLET ORAL EVERY 6 HOURS PRN
Status: DISCONTINUED | OUTPATIENT
Start: 2024-01-29 | End: 2024-02-08

## 2024-01-29 RX ORDER — FENTANYL CITRATE 50 UG/ML
25 INJECTION, SOLUTION INTRAMUSCULAR; INTRAVENOUS EVERY 5 MIN PRN
Status: DISCONTINUED | OUTPATIENT
Start: 2024-01-29 | End: 2024-01-29 | Stop reason: HOSPADM

## 2024-01-29 RX ORDER — LABETALOL HYDROCHLORIDE 5 MG/ML
10-40 INJECTION, SOLUTION INTRAVENOUS EVERY 10 MIN PRN
Status: DISCONTINUED | OUTPATIENT
Start: 2024-01-29 | End: 2024-02-08

## 2024-01-29 RX ORDER — AMOXICILLIN 250 MG
1 CAPSULE ORAL 2 TIMES DAILY
Status: DISCONTINUED | OUTPATIENT
Start: 2024-01-29 | End: 2024-02-08

## 2024-01-29 RX ORDER — HYDROMORPHONE HYDROCHLORIDE 2 MG/1
2 TABLET ORAL EVERY 4 HOURS PRN
Status: COMPLETED | OUTPATIENT
Start: 2024-01-29 | End: 2024-01-31

## 2024-01-29 RX ORDER — ONDANSETRON 2 MG/ML
4 INJECTION INTRAMUSCULAR; INTRAVENOUS EVERY 30 MIN PRN
Status: DISCONTINUED | OUTPATIENT
Start: 2024-01-29 | End: 2024-01-29 | Stop reason: HOSPADM

## 2024-01-29 RX ORDER — ONDANSETRON 4 MG/1
4 TABLET, ORALLY DISINTEGRATING ORAL EVERY 6 HOURS PRN
Status: DISCONTINUED | OUTPATIENT
Start: 2024-01-29 | End: 2024-02-08

## 2024-01-29 RX ORDER — DROSPIRENONE AND ETHINYL ESTRADIOL 0.02-3(28)
1 KIT ORAL AT BEDTIME
Status: DISCONTINUED | OUTPATIENT
Start: 2024-01-29 | End: 2024-02-09 | Stop reason: HOSPADM

## 2024-01-29 RX ORDER — HYDRALAZINE HYDROCHLORIDE 20 MG/ML
2.5-5 INJECTION INTRAMUSCULAR; INTRAVENOUS EVERY 10 MIN PRN
Status: DISCONTINUED | OUTPATIENT
Start: 2024-01-29 | End: 2024-01-29 | Stop reason: HOSPADM

## 2024-01-29 RX ORDER — HYDROMORPHONE HYDROCHLORIDE 1 MG/ML
0.2 INJECTION, SOLUTION INTRAMUSCULAR; INTRAVENOUS; SUBCUTANEOUS EVERY 5 MIN PRN
Status: CANCELLED | OUTPATIENT
Start: 2024-01-29

## 2024-01-29 RX ORDER — SODIUM CHLORIDE, SODIUM LACTATE, POTASSIUM CHLORIDE, CALCIUM CHLORIDE 600; 310; 30; 20 MG/100ML; MG/100ML; MG/100ML; MG/100ML
INJECTION, SOLUTION INTRAVENOUS CONTINUOUS
Status: DISCONTINUED | OUTPATIENT
Start: 2024-01-29 | End: 2024-01-29 | Stop reason: HOSPADM

## 2024-01-29 RX ORDER — HYDROMORPHONE HCL IN WATER/PF 6 MG/30 ML
0.2 PATIENT CONTROLLED ANALGESIA SYRINGE INTRAVENOUS EVERY 5 MIN PRN
Status: DISCONTINUED | OUTPATIENT
Start: 2024-01-29 | End: 2024-01-29 | Stop reason: HOSPADM

## 2024-01-29 RX ORDER — DEXAMETHASONE SODIUM PHOSPHATE 4 MG/ML
INJECTION, SOLUTION INTRA-ARTICULAR; INTRALESIONAL; INTRAMUSCULAR; INTRAVENOUS; SOFT TISSUE PRN
Status: DISCONTINUED | OUTPATIENT
Start: 2024-01-29 | End: 2024-01-29

## 2024-01-29 RX ORDER — ACETAZOLAMIDE 250 MG/1
250 TABLET ORAL
Status: DISCONTINUED | OUTPATIENT
Start: 2024-01-29 | End: 2024-01-30

## 2024-01-29 RX ORDER — HALOPERIDOL 5 MG/ML
1 INJECTION INTRAMUSCULAR
Status: DISCONTINUED | OUTPATIENT
Start: 2024-01-29 | End: 2024-01-29 | Stop reason: HOSPADM

## 2024-01-29 RX ORDER — CEFAZOLIN SODIUM/WATER 2 G/20 ML
2 SYRINGE (ML) INTRAVENOUS
Status: COMPLETED | OUTPATIENT
Start: 2024-01-29 | End: 2024-01-29

## 2024-01-29 RX ORDER — FENTANYL CITRATE 50 UG/ML
50 INJECTION, SOLUTION INTRAMUSCULAR; INTRAVENOUS EVERY 5 MIN PRN
Status: CANCELLED | OUTPATIENT
Start: 2024-01-29

## 2024-01-29 RX ORDER — ONDANSETRON 2 MG/ML
4 INJECTION INTRAMUSCULAR; INTRAVENOUS EVERY 30 MIN PRN
Status: CANCELLED | OUTPATIENT
Start: 2024-01-29

## 2024-01-29 RX ORDER — HYDROMORPHONE HYDROCHLORIDE 1 MG/ML
0.4 INJECTION, SOLUTION INTRAMUSCULAR; INTRAVENOUS; SUBCUTANEOUS EVERY 5 MIN PRN
Status: CANCELLED | OUTPATIENT
Start: 2024-01-29

## 2024-01-29 RX ORDER — SODIUM CHLORIDE, SODIUM LACTATE, POTASSIUM CHLORIDE, CALCIUM CHLORIDE 600; 310; 30; 20 MG/100ML; MG/100ML; MG/100ML; MG/100ML
INJECTION, SOLUTION INTRAVENOUS CONTINUOUS PRN
Status: DISCONTINUED | OUTPATIENT
Start: 2024-01-29 | End: 2024-01-29

## 2024-01-29 RX ORDER — HYDROMORPHONE HCL IN WATER/PF 6 MG/30 ML
0.2 PATIENT CONTROLLED ANALGESIA SYRINGE INTRAVENOUS
Status: DISCONTINUED | OUTPATIENT
Start: 2024-01-29 | End: 2024-02-08

## 2024-01-29 RX ORDER — SODIUM CHLORIDE 9 MG/ML
INJECTION, SOLUTION INTRAVENOUS CONTINUOUS
Status: DISCONTINUED | OUTPATIENT
Start: 2024-01-29 | End: 2024-01-29 | Stop reason: HOSPADM

## 2024-01-29 RX ORDER — BISACODYL 10 MG
10 SUPPOSITORY, RECTAL RECTAL DAILY PRN
Status: DISCONTINUED | OUTPATIENT
Start: 2024-01-29 | End: 2024-02-08

## 2024-01-29 RX ADMIN — HYDROMORPHONE HYDROCHLORIDE 0.4 MG: 0.2 INJECTION, SOLUTION INTRAMUSCULAR; INTRAVENOUS; SUBCUTANEOUS at 18:14

## 2024-01-29 RX ADMIN — CEFAZOLIN SODIUM 2 G: 2 INJECTION, SOLUTION INTRAVENOUS at 21:51

## 2024-01-29 RX ADMIN — DEXMEDETOMIDINE HYDROCHLORIDE 8 MCG: 100 INJECTION, SOLUTION INTRAVENOUS at 15:22

## 2024-01-29 RX ADMIN — LIDOCAINE HYDROCHLORIDE 100 MG: 20 INJECTION, SOLUTION INFILTRATION; PERINEURAL at 14:47

## 2024-01-29 RX ADMIN — SODIUM CHLORIDE, POTASSIUM CHLORIDE, SODIUM LACTATE AND CALCIUM CHLORIDE: 600; 310; 30; 20 INJECTION, SOLUTION INTRAVENOUS at 14:40

## 2024-01-29 RX ADMIN — SUCCINYLCHOLINE CHLORIDE 100 MG: 20 INJECTION, SOLUTION INTRAMUSCULAR; INTRAVENOUS; PARENTERAL at 14:47

## 2024-01-29 RX ADMIN — FENTANYL CITRATE 100 MCG: 50 INJECTION INTRAMUSCULAR; INTRAVENOUS at 14:54

## 2024-01-29 RX ADMIN — HYDROMORPHONE HYDROCHLORIDE 0.4 MG: 0.2 INJECTION, SOLUTION INTRAMUSCULAR; INTRAVENOUS; SUBCUTANEOUS at 20:41

## 2024-01-29 RX ADMIN — PROPOFOL 50 MG: 10 INJECTION, EMULSION INTRAVENOUS at 15:40

## 2024-01-29 RX ADMIN — ONDANSETRON 4 MG: 2 INJECTION INTRAMUSCULAR; INTRAVENOUS at 16:05

## 2024-01-29 RX ADMIN — DEXMEDETOMIDINE HYDROCHLORIDE 8 MCG: 100 INJECTION, SOLUTION INTRAVENOUS at 15:09

## 2024-01-29 RX ADMIN — SUGAMMADEX 200 MG: 100 INJECTION, SOLUTION INTRAVENOUS at 16:28

## 2024-01-29 RX ADMIN — FENTANYL CITRATE 50 MCG: 50 INJECTION, SOLUTION INTRAMUSCULAR; INTRAVENOUS at 17:15

## 2024-01-29 RX ADMIN — SERTRALINE HYDROCHLORIDE 75 MG: 50 TABLET ORAL at 21:51

## 2024-01-29 RX ADMIN — ACETAMINOPHEN 975 MG: 325 TABLET, FILM COATED ORAL at 14:13

## 2024-01-29 RX ADMIN — FENTANYL CITRATE 50 MCG: 50 INJECTION INTRAMUSCULAR; INTRAVENOUS at 16:29

## 2024-01-29 RX ADMIN — PROPOFOL 40 MG: 10 INJECTION, EMULSION INTRAVENOUS at 15:50

## 2024-01-29 RX ADMIN — Medication 150 MG: at 21:50

## 2024-01-29 RX ADMIN — DROSPIRENONE AND ETHINYL ESTRADIOL 1 TABLET: KIT at 21:51

## 2024-01-29 RX ADMIN — PROPOFOL 200 MG: 10 INJECTION, EMULSION INTRAVENOUS at 14:47

## 2024-01-29 RX ADMIN — BUDESONIDE AND FORMOTEROL FUMARATE DIHYDRATE 2 PUFF: 80; 4.5 AEROSOL RESPIRATORY (INHALATION) at 21:50

## 2024-01-29 RX ADMIN — MIDAZOLAM 2 MG: 1 INJECTION INTRAMUSCULAR; INTRAVENOUS at 14:32

## 2024-01-29 RX ADMIN — DEXAMETHASONE SODIUM PHOSPHATE 6 MG: 4 INJECTION, SOLUTION INTRA-ARTICULAR; INTRALESIONAL; INTRAMUSCULAR; INTRAVENOUS; SOFT TISSUE at 14:54

## 2024-01-29 RX ADMIN — PROPOFOL 50 MCG/KG/MIN: 10 INJECTION, EMULSION INTRAVENOUS at 14:47

## 2024-01-29 RX ADMIN — ROCURONIUM BROMIDE 20 MG: 10 INJECTION, SOLUTION INTRAVENOUS at 15:50

## 2024-01-29 RX ADMIN — OXYCODONE HYDROCHLORIDE 5 MG: 5 TABLET ORAL at 22:01

## 2024-01-29 RX ADMIN — FENTANYL CITRATE 50 MCG: 50 INJECTION INTRAMUSCULAR; INTRAVENOUS at 15:38

## 2024-01-29 RX ADMIN — SODIUM CHLORIDE: 9 INJECTION, SOLUTION INTRAVENOUS at 17:17

## 2024-01-29 RX ADMIN — Medication 2 G: at 14:36

## 2024-01-29 RX ADMIN — SODIUM CHLORIDE 1000 ML: 9 INJECTION, SOLUTION INTRAVENOUS at 13:06

## 2024-01-29 RX ADMIN — FENTANYL CITRATE 50 MCG: 50 INJECTION, SOLUTION INTRAMUSCULAR; INTRAVENOUS at 17:30

## 2024-01-29 RX ADMIN — FENTANYL CITRATE 50 MCG: 50 INJECTION INTRAMUSCULAR; INTRAVENOUS at 15:05

## 2024-01-29 RX ADMIN — DEXMEDETOMIDINE HYDROCHLORIDE 4 MCG: 100 INJECTION, SOLUTION INTRAVENOUS at 15:16

## 2024-01-29 RX ADMIN — PHENYLEPHRINE HYDROCHLORIDE 100 MCG: 10 INJECTION INTRAVENOUS at 15:28

## 2024-01-29 RX ADMIN — SENNOSIDES AND DOCUSATE SODIUM 1 TABLET: 8.6; 5 TABLET ORAL at 20:41

## 2024-01-29 RX ADMIN — HYDROMORPHONE HYDROCHLORIDE 1 MG: 1 INJECTION, SOLUTION INTRAMUSCULAR; INTRAVENOUS; SUBCUTANEOUS at 13:00

## 2024-01-29 ASSESSMENT — ENCOUNTER SYMPTOMS
DYSRHYTHMIAS: 1
SEIZURES: 0

## 2024-01-29 ASSESSMENT — ACTIVITIES OF DAILY LIVING (ADL)
ADLS_ACUITY_SCORE: 35
ADLS_ACUITY_SCORE: 22
ADLS_ACUITY_SCORE: 35

## 2024-01-29 ASSESSMENT — LIFESTYLE VARIABLES: TOBACCO_USE: 0

## 2024-01-29 NOTE — ED PROVIDER NOTES
ED Provider Note  [facility]      History     Chief Complaint   Patient presents with    Wound Infection    Shunt Malfunction     The history is provided by the patient and medical records.     Leigh Sloan is a 23 year old female with history of IIH s/p  shunt on 1/22 presenting to the ED as advised by Neurosurgery after blood cultures taken from her  shunt returned positive. Patient reports headache as well as right sided neck pain at the site of her surgical scar. She denies nausea or vomiting. No other complaints at this time.     Past Medical History  Past Medical History:   Diagnosis Date    Acid reflux     Anxiety     Asthma     Depression     High cholesterol     IIH (idiopathic intracranial hypertension)     Non-alcoholic fatty liver disease     PCOS (polycystic ovarian syndrome)     PTSD (post-traumatic stress disorder)     Scoliosis     Tachycardia      Past Surgical History:   Procedure Laterality Date    CV HEART CATHETERIZATION WITH POSSIBLE INTERVENTION      IMPLANT SHUNT VENTRICULOPERITONEAL      OPTICAL TRACKING SYSTEM IMPLANT SHUNT VENTRICULOPERITONEAL Right 1/22/2024    Procedure: Stealth guided right sided ventriculoperitoneal shunt exploration and replacement of right sided proximal ventricular catheter and shunt valve;  Surgeon: Calvin Cabrera MD;  Location:  OR     No current outpatient medications on file.    Allergies   Allergen Reactions    Sulfa Antibiotics Unknown    Sulfamethoxazole-Trimethoprim Nausea and Nausea and Vomiting    Trimethoprim Rash     Family History  Family History   Problem Relation Age of Onset    Anxiety Disorder Mother     Ataxia Mother     Depression Mother     Diabetes Mother     Fibromyalgia Mother     Hypertension Mother     Anxiety Disorder Father     Depression Father     Diabetes Father     Anxiety Disorder Sister     Bipolar Disorder Sister     Fibromyalgia Sister     Breast Cancer Paternal Grandmother      "Skin Cancer Paternal Grandmother     Colon Cancer Paternal Grandfather     Nystagmus No family hx of     Strabismus No family hx of     Amblyopia No family hx of     Glasses (<7 y/o) No family hx of      Social History   Social History     Tobacco Use    Smoking status: Never    Smokeless tobacco: Never   Substance Use Topics    Alcohol use: Not Currently    Drug use: Yes     Types: Marijuana     Comment: Gummies on occasion         A medically appropriate review of systems was performed with pertinent positives and negatives noted in the HPI, and all other systems negative.    Physical Exam   BP: 121/62  Pulse: (!) 123  Temp: 98.3  F (36.8  C)  Resp: 20  Height: 165.1 cm (5' 5\")  Weight: 115.2 kg (253 lb 15.5 oz)  SpO2: 97 %  Physical Exam  Vitals and nursing note reviewed.   Constitutional:       General: She is not in acute distress.     Appearance: Normal appearance. She is not diaphoretic.   HENT:      Head: Normocephalic and atraumatic.        Comments: Surgical scar behind the R ear with palpable tenderness along the scar and just above it.     Mouth/Throat:      Mouth: Mucous membranes are moist.   Eyes:      General: No scleral icterus.     Conjunctiva/sclera: Conjunctivae normal.   Cardiovascular:      Rate and Rhythm: Regular rhythm. Tachycardia present.      Heart sounds: Normal heart sounds.   Pulmonary:      Effort: Pulmonary effort is normal. No respiratory distress.      Breath sounds: Normal breath sounds.   Abdominal:      General: Abdomen is flat. Bowel sounds are normal.      Palpations: Abdomen is soft.   Musculoskeletal:         General: Normal range of motion.      Cervical back: Normal range of motion and neck supple. No tenderness.   Lymphadenopathy:      Cervical: No cervical adenopathy.   Skin:     General: Skin is warm.      Findings: No rash.   Neurological:      General: No focal deficit present.      Mental Status: She is alert and oriented to person, place, and time.      Cranial " Nerves: No cranial nerve deficit.      Sensory: No sensory deficit.      Motor: No weakness.   Psychiatric:         Mood and Affect: Mood normal.         Behavior: Behavior normal.         ED Course, Procedures, & Data      Procedures       A consult was attained from the neurosurgery service. The case was discussed with resident from that service.           Results for orders placed or performed during the hospital encounter of 01/29/24   INR     Status: Normal   Result Value Ref Range    INR 0.97 0.85 - 1.15   Partial thromboplastin time     Status: Normal   Result Value Ref Range    aPTT 23 22 - 38 Seconds   Basic metabolic panel     Status: Normal   Result Value Ref Range    Sodium 138 135 - 145 mmol/L    Potassium 4.4 3.4 - 5.3 mmol/L    Chloride 103 98 - 107 mmol/L    Carbon Dioxide (CO2) 22 22 - 29 mmol/L    Anion Gap 13 7 - 15 mmol/L    Urea Nitrogen 12.5 6.0 - 20.0 mg/dL    Creatinine 0.80 0.51 - 0.95 mg/dL    GFR Estimate >90 >60 mL/min/1.73m2    Calcium 9.4 8.6 - 10.0 mg/dL    Glucose 96 70 - 99 mg/dL   Erythrocyte sedimentation rate auto     Status: Abnormal   Result Value Ref Range    Erythrocyte Sedimentation Rate 25 (H) 0 - 20 mm/hr   CRP inflammation     Status: Abnormal   Result Value Ref Range    CRP Inflammation 15.00 (H) <5.00 mg/L   CBC with platelets and differential     Status: None   Result Value Ref Range    WBC Count 8.3 4.0 - 11.0 10e3/uL    RBC Count 4.58 3.80 - 5.20 10e6/uL    Hemoglobin 12.7 11.7 - 15.7 g/dL    Hematocrit 37.5 35.0 - 47.0 %    MCV 82 78 - 100 fL    MCH 27.7 26.5 - 33.0 pg    MCHC 33.9 31.5 - 36.5 g/dL    RDW 13.6 10.0 - 15.0 %    Platelet Count 407 150 - 450 10e3/uL    % Neutrophils 59 %    % Lymphocytes 34 %    % Monocytes 5 %    % Eosinophils 1 %    % Basophils 1 %    % Immature Granulocytes 0 %    NRBCs per 100 WBC 0 <1 /100    Absolute Neutrophils 4.9 1.6 - 8.3 10e3/uL    Absolute Lymphocytes 2.8 0.8 - 5.3 10e3/uL    Absolute Monocytes 0.4 0.0 - 1.3 10e3/uL     Absolute Eosinophils 0.1 0.0 - 0.7 10e3/uL    Absolute Basophils 0.0 0.0 - 0.2 10e3/uL    Absolute Immature Granulocytes 0.0 <=0.4 10e3/uL    Absolute NRBCs 0.0 10e3/uL   HCG qualitative pregnancy (blood)     Status: Normal   Result Value Ref Range    hCG Serum Qualitative Negative Negative   Adult Type and Screen     Status: None   Result Value Ref Range    ABO/RH(D) O POS     Antibody Screen Negative Negative    SPECIMEN EXPIRATION DATE [id number]    CBC with platelets differential     Status: None    Narrative    The following orders were created for panel order CBC with platelets differential.  Procedure                               Abnormality         Status                     ---------                               -----------         ------                     CBC with platelets and d...[[id number]]                      Final result                 Please view results for these tests on the individual orders.   ABO/Rh type and screen     Status: None    Narrative    The following orders were created for panel order ABO/Rh type and screen.  Procedure                               Abnormality         Status                     ---------                               -----------         ------                     Adult Type and Screen[[id number]]                            Final result                 Please view results for these tests on the individual orders.     Medications   sodium chloride 0.9 % infusion (has no administration in time range)   sodium chloride 0.9% BOLUS 1,000 mL (1,000 mLs Intravenous $New Bag 1/29/24 1306)   HYDROmorphone (DILAUDID) injection 1 mg (1 mg Intravenous $Given 1/29/24 1300)   ceFAZolin Sodium (ANCEF) injection 2 g (2 g Intravenous $Given 1/29/24 1436)   acetaminophen (TYLENOL) tablet 975 mg (975 mg Oral $Given 1/29/24 1413)     Labs Ordered and Resulted from Time of ED Arrival to Time of ED Departure   ERYTHROCYTE SEDIMENTATION RATE AUTO - Abnormal       Result Value     Erythrocyte Sedimentation Rate 25 (*)    CRP INFLAMMATION - Abnormal    CRP Inflammation 15.00 (*)    INR - Normal    INR 0.97     PARTIAL THROMBOPLASTIN TIME - Normal    aPTT 23     BASIC METABOLIC PANEL - Normal    Sodium 138      Potassium 4.4      Chloride 103      Carbon Dioxide (CO2) 22      Anion Gap 13      Urea Nitrogen 12.5      Creatinine 0.80      GFR Estimate >90      Calcium 9.4      Glucose 96     HCG QUALITATIVE PREGNANCY - Normal    hCG Serum Qualitative Negative     CBC WITH PLATELETS AND DIFFERENTIAL    WBC Count 8.3      RBC Count 4.58      Hemoglobin 12.7      Hematocrit 37.5      MCV 82      MCH 27.7      MCHC 33.9      RDW 13.6      Platelet Count 407      % Neutrophils 59      % Lymphocytes 34      % Monocytes 5      % Eosinophils 1      % Basophils 1      % Immature Granulocytes 0      NRBCs per 100 WBC 0      Absolute Neutrophils 4.9      Absolute Lymphocytes 2.8      Absolute Monocytes 0.4      Absolute Eosinophils 0.1      Absolute Basophils 0.0      Absolute Immature Granulocytes 0.0      Absolute NRBCs 0.0     ERYTHROCYTE SEDIMENTATION RATE AUTO   TYPE AND SCREEN, ADULT    ABO/RH(D) O POS      Antibody Screen Negative      SPECIMEN EXPIRATION DATE [id number]     BLOOD CULTURE   BLOOD CULTURE   BLOOD CULTURE   ABO/RH TYPE AND SCREEN     No orders to display          Critical care was not performed.     Medical Decision Making  The patient's presentation was of high complexity (an acute health issue posing potential threat to life or bodily function).    The patient's evaluation involved:  ordering and/or review of 3+ test(s) in this encounter (see separate area of note for details)  discussion of management or test interpretation with another health professional (see separate area of note for details)    The patient's management necessitated high risk (a decision regarding emergency major procedure (went to the OR with operative service)) and high risk (a decision regarding  hospitalization).    Assessment & Plan    22 yo female here VPS that was recently placed and now infected after cultures from the shunt came back positive. She was called by neurosurgery and told to come in for surgery for shunt removal. They requested preop labs and a CT head.    Labs and CT head had not returned before NS brought her to the OR. She will be admitted to their service afterwards.    I have reviewed the nursing notes. I have reviewed the findings, diagnosis, plan and need for follow up with the patient.    Current Discharge Medication List          Final diagnoses:   Infection of ventriculoperitoneal shunt, initial encounter (H24)   I, Court Mckeon, am serving as a trained medical scribe to document services personally performed by Calvin Alexis MD, based on the provider's statements to me.     ICalvin MD, was physically present and have reviewed and verified the accuracy of this note documented by Court Mckeon.      Calvin Alexis MD  [facility] EMERGENCY DEPARTMENT  1/29/2024     Calvin Alexis MD  01/29/24 1524

## 2024-01-29 NOTE — ED TRIAGE NOTES
Arrives ambulatory with an infection in her shunt. Per patient had her shunt replace 1/22. Per patient lab called her over the weekend concerning the infection      Triage Assessment (Adult)       Row Name 01/29/24 1133          Triage Assessment    Airway WDL WDL        Respiratory WDL    Respiratory WDL WDL        Skin Circulation/Temperature WDL    Skin Circulation/Temperature WDL WDL        Cardiac WDL    Cardiac WDL WDL        Peripheral/Neurovascular WDL    Peripheral Neurovascular WDL WDL        Cognitive/Neuro/Behavioral WDL    Cognitive/Neuro/Behavioral WDL WDL

## 2024-01-29 NOTE — H&P
[facility]       NEUROSURGERY H&P NOTE      HPI:  Leigh Sloan is a 23 year-old female with history of idiopathic intracranial hypertension s/p right occipital ventriculoperitoneal shunt (Codman Hakim 120) placement in 2020, replaced (Certas at 4) recently on 1/22/2024 with Dr. Cabrera for elevated ICP on lumbar puncture and recurrent papilledema on ophthalmologic evaluation.     Since shunt insertion patient reports resolution of pulsatile tinnitus since shunt placement. However, she reports that headache is largely unchanged. Denies significant changes in vision as well. Denies fever/chills, drainage at the incision site. Patient was called to return to [facility] due to concern for cultures obtained intra-op during her shunt revision on 1/22, as cultures taken during the shunt replacement had speciated with Cutibacterium acnes. She now presents for explantation of her shunt system and management of her idiopathic intracranial hypertension.    No recent fevers, chills, nausea, vomiting, chest pain, shortness of breath, and denies weakness, LOC, numbness/weakness/paresthesias in extremities, changes in sensation, taste, smell, nor trouble speaking or other neurologic symptoms.    PAST MEDICAL HISTORY:   Past Medical History:   Diagnosis Date    Acid reflux     Anxiety     Asthma     Depression     High cholesterol     IIH (idiopathic intracranial hypertension)     Non-alcoholic fatty liver disease     PCOS (polycystic ovarian syndrome)     PTSD (post-traumatic stress disorder)     Scoliosis     Tachycardia        PAST SURGICAL HISTORY:   Past Surgical History:   Procedure Laterality Date    CV HEART CATHETERIZATION WITH POSSIBLE INTERVENTION      IMPLANT SHUNT VENTRICULOPERITONEAL      OPTICAL TRACKING SYSTEM IMPLANT SHUNT VENTRICULOPERITONEAL Right 1/22/2024    Procedure: Stealth guided right sided ventriculoperitoneal shunt exploration and replacement of right sided  proximal ventricular catheter and shunt valve;  Surgeon: Calvin Cabrera MD;  Location: [facility] OR       FAMILY HISTORY:   Family History   Problem Relation Age of Onset    Anxiety Disorder Mother     Ataxia Mother     Depression Mother     Diabetes Mother     Fibromyalgia Mother     Hypertension Mother     Anxiety Disorder Father     Depression Father     Diabetes Father     Anxiety Disorder Sister     Bipolar Disorder Sister     Fibromyalgia Sister     Breast Cancer Paternal Grandmother     Skin Cancer Paternal Grandmother     Colon Cancer Paternal Grandfather     Nystagmus No family hx of     Strabismus No family hx of     Amblyopia No family hx of     Glasses (<9 y/o) No family hx of        SOCIAL HISTORY:   Social History     Tobacco Use    Smoking status: Never    Smokeless tobacco: Never   Substance Use Topics    Alcohol use: Not Currently       MEDICATIONS:  No current outpatient medications on file.       Allergies:  Allergies   Allergen Reactions    Sulfa Antibiotics Unknown    Sulfamethoxazole-Trimethoprim Nausea and Nausea and Vomiting    Trimethoprim Rash       ROS: 10 point ROS of systems including Constitutional, Eyes, Respiratory, Cardiovascular, Gastroenterology, Genitourinary, Integumentary, Muscularskeletal, Psychiatric were all negative except for pertinent positives noted in my HPI.    Physical exam:   Blood pressure 125/88, pulse 105, temperature 98.8  F (37.1  C), temperature source Oral, resp. rate 16, last menstrual period 12/10/2023, SpO2 98%, not currently breastfeeding.  General: awake and alert  HEENT: Normocephalic. Prior incision site clean, dry, intact (wound in chart)  PULM: breathing comfortably on room air  MSK: no gross deformities  : rectal tone deferred  NEUROLOGIC:  -- Awake; Alert; oriented x 3  -- Follows commands briskly  -- +repetition, calculation, and naming  -- Speech fluent, spontaneous. No aphasia or dysarthria.  -- no gaze preference. No apparent  hemineglect.  Cranial Nerves:  -- visual fields full to confrontation, PERRL 3-2mm bilat and brisk, extraocular movements intact  -- face symmetrical, tongue midline  -- sensory V1-V3 intact bilaterally  -- palate elevates symmetrically, uvula midline  -- hearing grossly intact bilat  -- Trapezii 5/5 strength bilat symmetric  -- Cerebellar: Finger nose finger without dysmetria, intact rapid alternating motions bilaterally    Motor:  Normal bulk / tone; no tremor, rigidity, or bradykinesia.  No muscle wasting or fasciculations  No Pronator Drift     Delt Bi Tri Hand Flexion/  Extension Iliopsoas Quadriceps Hamstrings Tibialis Anterior Gastroc    C5 C6 C7 C8/T1 L2 L3 L4-S1 L4 S1   R 5 5 5 5 5 5 5 5 5   L 5 5 5 5 5 5 5 5 5     Sensory:  intact to LT x 4 extremities       Reflexes: no clonus       Bi Tri BR Jyoti Pat Ach Bab     C5-6 C7-8 C6 UMN L2-4 S1 UMN   R 2+ 2+ 2+ Norm 2+ 2+ Norm   L 2+ 2+ 2+ Norm 2+ 2+ Norm      Gait: Normal.       IMAGING:  No recent imaging.      LABS:   Last Comprehensive Metabolic Panel:  Lab Results   Component Value Date     01/29/2024    POTASSIUM 4.4 01/29/2024    CHLORIDE 103 01/29/2024    CO2 22 01/29/2024    ANIONGAP 13 01/29/2024    GLC 96 01/29/2024    BUN 12.5 01/29/2024    CR 0.80 01/29/2024    GFRESTIMATED >90 01/29/2024    KEV 9.4 01/29/2024         Lab Results   Component Value Date    WBC 8.3 01/29/2024     Lab Results   Component Value Date    RBC 4.58 01/29/2024     Lab Results   Component Value Date    HGB 12.7 01/29/2024     Lab Results   Component Value Date    HCT 37.5 01/29/2024     Lab Results   Component Value Date    MCV 82 01/29/2024     Lab Results   Component Value Date    MCH 27.7 01/29/2024     Lab Results   Component Value Date    MCHC 33.9 01/29/2024     Lab Results   Component Value Date    RDW 13.6 01/29/2024     Lab Results   Component Value Date     01/29/2024     INR   Date Value Ref Range Status   01/29/2024 0.97 0.85 - 1.15 Final      aPTT    Date Value Ref Range Status   01/29/2024 23 22 - 38 Seconds Final      @Fibrinogen1@    ASSESSMENT:  Leigh Sloan is a 23 year-old female with history of idiopathic intracranial hypertension s/p right occipital ventriculoperitoneal shunt (Codman Hakim 120) placement in 2020, recently replaced shunt system 1/22/2024 with Dr. Cabrera due to elevated ICP and papilledema (Certas at 4), who has returned for shunt removal due to concern for shunt colonization with C. acnes.      PLAN:  No neurosurgical intervention indicated at this time   If concern for wound leaking CSF, make NPO and add on for OR EVD if leaking from incision  Infectious Disease Consult, appreciate reccs  Activity: Up ad desean  Serial neuro exams   Pain control  Trial of diamox for IIH  Supplemental oxygen PRN  Incentive spirometry Q1H while awake  Advance diet as tolerated to regular  High Electrolyte replacement protocol  Continue to monitor intake/output  Ancef x3 for antibiotics   Continue to monitor for fevers and/or signs of infection    Continue glucose checks  Platelets > 100,000  INR < 1.5  Hemoglobin > 8  DVT: SCDs while in bed  Disposition: Floor  PT/OT consulted    The patient was discussed with Dr. Dahl, neurosurgery chief resident, and Dr. Cabrera, neurosurgery staff.      WILLIAM RUSSO MD on 1/29/2024 at 8:38 PM  PGY1 Neurosurgery Intern

## 2024-01-29 NOTE — LETTER
Transition Communication Hand-off for Care Transitions to Next Level of Care Provider    Name: Leigh Sloan  : 2000  MRN #: [id number]  Primary Care Provider: Saravanan Malik     Primary Clinic: [facility] [address] [address]     Reason for Hospitalization:  Infection of ventriculoperitoneal shunt, initial encounter (H24) [T85.730A]  Admit Date/Time: 2024 11:57 AM  Discharge Date: 24  Payor Source: Payor: BLUE PLUS / Plan: [employer]US ADVANTAGE MA / Product Type: HMO /            Reason for Communication Hand-off Referral: Other conitnuity of care    Discharge Plan: discharge home with IV anitbiotics    Concern for non-adherence with plan of care:   Y/N no  Discharge Needs Assessment:  Needs      Flowsheet Row Most Recent Value   Equipment Currently Used at Home walker, rolling          Follow-up specialty is recommended: Yes    Follow-up plan:    Future Appointments   Date Time Provider Department Center   2024 12:00 PM Ofelia Cortez, PT [facility]   2/10/2024  9:30 AM [facility] PT WAITLIST [facility] O       Any outstanding tests or procedures:        Referrals       Future Labs/Procedures    Home Infusion Referral     Comments:    [facility] - home IV antibiotics  Ph: [phone]  Fax: [phone]              Key Recommendations:  please see attached AVS    Jennifer Batista RN    AVS/Discharge Summary is the source of truth; this is a helpful guide for improved communication of patient story

## 2024-01-29 NOTE — ANESTHESIA PREPROCEDURE EVALUATION
Anesthesia Pre-Procedure Evaluation    Patient: Leigh Sloan   MRN: [id number] : 2000        Procedure : Procedure(s):  REMOVAL, SHUNT, VENTRICULOPERITONEAL, possible external ventricular drain placement          Past Medical History:   Diagnosis Date    Acid reflux     Anxiety     Asthma     Depression     High cholesterol     IIH (idiopathic intracranial hypertension)     Non-alcoholic fatty liver disease     PCOS (polycystic ovarian syndrome)     PTSD (post-traumatic stress disorder)     Scoliosis     Tachycardia       Past Surgical History:   Procedure Laterality Date    CV HEART CATHETERIZATION WITH POSSIBLE INTERVENTION      IMPLANT SHUNT VENTRICULOPERITONEAL      OPTICAL TRACKING SYSTEM IMPLANT SHUNT VENTRICULOPERITONEAL Right 2024    Procedure: Stealth guided right sided ventriculoperitoneal shunt exploration and replacement of right sided proximal ventricular catheter and shunt valve;  Surgeon: Calvin Cabrera MD;  Location: [facility] OR      Allergies   Allergen Reactions    Sulfa Antibiotics Unknown    Sulfamethoxazole-Trimethoprim Nausea and Nausea and Vomiting    Trimethoprim Rash      Social History     Tobacco Use    Smoking status: Never    Smokeless tobacco: Never   Substance Use Topics    Alcohol use: Not Currently      Wt Readings from Last 1 Encounters:   24 117.3 kg (258 lb 9.6 oz)        Anesthesia Evaluation   Pt has had prior anesthetic. Type: General and MAC.    History of anesthetic complications (Agitated wakeup)  - PONV.      ROS/MED HX  ENT/Pulmonary:     (+)     EDDA risk factors,   obese,  observed stopped breathing,           Mild Persistent, asthma Last exacerbation: 2023,  Treatment: Oral steroids and Inhaled steroids,       recent URI,  resolving, completed antibiotics and steroid dosing:     (-) tobacco use   Neurologic: Comment: IIH s/p R  shunt with question on functionality  Recurrent papilledema    (+)      migraines,                        (-) no seizures and no CVA   Cardiovascular: Comment: Tachycardia with negative work up, on metprolol    (+) Dyslipidemia - -   -  - -                        dysrhythmias, Other,        Previous cardiac testing  (-) taking anticoagulants/antiplatelets and URRUTIA   METS/Exercise Tolerance: 3 - Able to walk 1-2 blocks without stopping    Hematologic:    (-) history of blood clots and history of blood transfusion   Musculoskeletal: Comment: Reports possible fibromyalgia, scoliosis, and inflammation       GI/Hepatic: Comment: Fatty liver    (+) GERD, Asymptomatic on medication,           liver disease,       Renal/Genitourinary:  - neg Renal ROS     Endo: Comment: PCOS    (+)               Obesity,    (-) chronic steroid usage   Psychiatric/Substance Use:     (+) psychiatric history anxiety, depression and other (comment) (PTSD)   Recreational drug usage: Cannabis (Occ edibles).    Infectious Disease:  - neg infectious disease ROS     Malignancy:  - neg malignancy ROS     Other:  - neg other ROS    (+)  , H/O Chronic Pain,         Physical Exam    Airway        Mallampati: II   TM distance: > 3 FB   Neck ROM: full   Mouth opening: > 3 cm    Respiratory Devices and Support         Dental       (+) Minor Abnormalities - some fillings, tiny chips      Cardiovascular   cardiovascular exam normal          Pulmonary   pulmonary exam normal                OUTSIDE LABS:  CBC:   Lab Results   Component Value Date    WBC 9.1 01/23/2024    WBC 9.3 01/22/2024    HGB 12.1 01/23/2024    HGB 12.1 01/22/2024    HCT 36.7 01/23/2024    HCT 36.0 01/22/2024     01/23/2024     01/22/2024     BMP:   Lab Results   Component Value Date     01/23/2024     01/22/2024    POTASSIUM 4.0 01/23/2024    POTASSIUM 3.9 01/22/2024    CHLORIDE 106 01/23/2024    CHLORIDE 104 01/22/2024    CO2 23 01/23/2024    CO2 19 (L) 01/22/2024    BUN 9.2 01/23/2024    BUN 14.7 01/22/2024    CR 0.71 01/23/2024    CR 0.79 01/22/2024    GLC 85  "01/24/2024    GLC 84 01/23/2024     COAGS:   Lab Results   Component Value Date    PTT 25 01/22/2024    INR 1.01 01/22/2024     POC: No results found for: \"BGM\", \"HCG\", \"HCGS\"  HEPATIC: No results found for: \"ALBUMIN\", \"PROTTOTAL\", \"ALT\", \"AST\", \"GGT\", \"ALKPHOS\", \"BILITOTAL\", \"BILIDIRECT\", \"BRET\"  OTHER:   Lab Results   Component Value Date    KEV 8.7 01/23/2024       Anesthesia Plan    ASA Status:  3       Anesthesia Type: General.     - Airway: ETT   Induction: Intravenous.   Maintenance: Inhalation.        Consents    Anesthesia Plan(s) and associated risks, benefits, and realistic alternatives discussed. Questions answered and patient/representative(s) expressed understanding.     - Discussed:     - Discussed with:  Patient      - Extended Intubation/Ventilatory Support Discussed: No.      - Patient is DNR/DNI Status: No     Use of blood products discussed: No .     Postoperative Care    Pain management: IV analgesics, Oral pain medications.   PONV prophylaxis: Ondansetron (or other 5HT-3), Dexamethasone or Solumedrol     Comments:               Elsa Chong MD    I have reviewed the pertinent notes and labs in the chart from the past 30 days and (re)examined the patient.  Any updates or changes from those notes are reflected in this note.              # Severe Obesity: Estimated body mass index is 43.03 kg/m  as calculated from the following:    Height as of 1/22/24: 1.651 m (5' 5\").    Weight as of 1/22/24: 117.3 kg (258 lb 9.6 oz).      "

## 2024-01-29 NOTE — ANESTHESIA CARE TRANSFER NOTE
Patient: Leigh Sloan    Procedure: Procedure(s):  REMOVAL, SHUNT, VENTRICULOPERITONEAL       Diagnosis: IIH (idiopathic intracranial hypertension) [G93.2]  Diagnosis Additional Information: No value filed.    Anesthesia Type:   General     Note:    Oropharynx: oropharynx clear of all foreign objects and spontaneously breathing  Level of Consciousness: drowsy  Oxygen Supplementation: room air    Independent Airway: airway patency satisfactory and stable  Dentition: dentition unchanged  Vital Signs Stable: post-procedure vital signs reviewed and stable  Report to RN Given: handoff report given  Patient transferred to: PACU    Handoff Report: Identifed the Patient, Identified the Reponsible Provider, Reviewed the pertinent medical history, Discussed the surgical course, Reviewed Intra-OP anesthesia mangement and issues during anesthesia, Set expectations for post-procedure period and Allowed opportunity for questions and acknowledgement of understanding      Vitals:  Vitals Value Taken Time   /67 01/29/24 1652   Temp     Pulse 104 01/29/24 1654   Resp 11 01/29/24 1654   SpO2 96 % 01/29/24 1654   Vitals shown include unfiled device data.    Electronically Signed By: NOHEMI Arias CRNA  January 29, 2024  4:54 PM

## 2024-01-29 NOTE — OP NOTE
PATIENT NAME: Leigh Sloan  PATIENT MRN: [id number]  PATIENT ACCOUNT: [id number]  PATIENT YOB: 2000    NEUROSURGERY OPERATIVE REPORT     DATE OF SURGERY: 01/29/24     PREOPERATIVE DIAGNOSIS:  1. IIH (idiopathic intracranial hypertension) [G93.2]      POSTOPERATIVE DIAGNOSIS:  1. IIH (idiopathic intracranial hypertension) [G93.2]      PROCEDURES PERFORMED:  1. REMOVAL, SHUNT, VENTRICULOPERITONEAL, Right - Head      STAFF SURGEON: Dr. Calvin Cabrera MD     RESIDENT SURGEONS: Nicole Rinaldi MD PhD     ANESTHESIA: General endotracheal anesthesia     ESTIMATED BLOOD LOSS: 5 mL     IMPLANTS: None    EXPLANTS:           Name Type Inv. Item Serial No.  Lot No. LRB No. Used Action   SHUNT CATH VENTRICULAR ANTIBIOTIC-IMPREG KIMBERLY 23CM [id number] - [id number] Shunt SHUNT CATH VENTRICULAR ANTIBIOTIC-IMPREG KIMBERLY 23CM 19955   [employer] [id number] Right 1 Explanted   SHUNT VALVE CERTAS PLUS W / SIPHONGUARD [id number] - [id number] Shunt SHUNT VALVE CERTAS PLUS W / SIPHONGUARD [id number]   [employer] [id number]           DRAINS: none     FINDINGS: No signs of infection. Entire shunt system removed successfully via scalp incision.      COMPLICATIONS: None     INDICATIONS: Leigh Sloan is a 23 year-old female with history of idiopathic intracranial hypertension s/p right occipital ventriculoperitoneal shunt (Codman Hakim 120) placement in 2020 in [place]. She was found with recurrent papilledema after she moved to [place], so she was referred for shunt evaluation. We could not reprogram her Codman shunt after multiple attempts, so we reviewed options for management and the option of shunt exploration. We explored the shunt on 1/22/2024 and there was a proximal and valve failure but no clear signs of infection, so cultures were sent and the proximal and valves were exchanged for a Certas system set to 4.  Her symptoms improved but cultures from the shunt system placed in [place] grew P acnes, so I  called the patient to come to the ED for shunt explantation. She now presents for explantation of her shunt system and management of her idiopathic intracranial hypertension. Risks and intended benefits of the procedure were discussed and she agreed to proceed.     DESCRIPTION OF PROCEDURE:  Leigh Sloan was brought to the operating room and their identity was verified. The patient was transferred to the operating table and placed in supine position on a horseshoe with head turned slightly to the right. General endotracheal anesthesia was obtained. The bed was turned 180 degrees. Preoperative antibiotics were administered.     The patient was cleaned, prepared, and draped in sterile fashion over her prior right scalp incision and along the shunt tract to the right abdomen. Timeout was performed.     The patients prior incision on the right scalp was opened bluntly with tips of Metzenbaum scissors after removing nylon suture material from the skin. All prior suture material was removed, as was the stitch anchoring the valve to the periosteum. The distal catheter was clamped with a hemostat and the silk tie connecting the valve to the distal catheter was cut. The distal catheter was disconnected from the valve and easily withdrawn from the abdomen. The peritoneal catheter tip was cut and sent for culture. The valve and proximal catheter were then similarly explanted. The tip of the ventricular catheter was cut and sent for culture.     Thereafter, we closed in layers with inverted, interrupted 2-0 vicryl for the galea and 3-0 running nylon for the skin. Sterile dressings were applied. The patient was extubated and transferred to PACU in stable position. At the end of the procedure, sponge and needle counts were correct.     Dr. Cabrera was present for the critical portions of the procedure and immediately available for the remainder.     Operative note prepared by Nicole Rinaldi MD PhD, Neurosurgery  PGY-2      Upon conclusion of the procedure, I called the patient's family for an update. I was scrubbed and present through the critical parts of the procedure and remained immediately available throughout.    Calvin Cabrera MD

## 2024-01-29 NOTE — BRIEF OP NOTE
[facility]    Brief Operative Note    Pre-operative diagnosis: IIH (idiopathic intracranial hypertension) [G93.2]  Post-operative diagnosis Same as pre-operative diagnosis    Procedure: REMOVAL, SHUNT, VENTRICULOPERITONEAL, Right - Head    Surgeon: Surgeon(s) and Role:     * Calvin Cabrera MD - Primary     * Nicole Rinaldi MD - Assisting  Anesthesia: General   Estimated Blood Loss: Minimal    Drains: None  Specimens:   ID Type Source Tests Collected by Time Destination   A : Distal Catheter Foreign Body Catheter tip ANAEROBIC BACTERIAL CULTURE ROUTINE, AEROBIC BACTERIAL CULTURE ROUTINE Calvin Cabrera MD 1/29/2024  3:52 PM    B : Proximal Catheter Foreign Body Catheter tip ANAEROBIC BACTERIAL CULTURE ROUTINE, AEROBIC BACTERIAL CULTURE ROUTINE Calvin Cabrera MD 1/29/2024  3:56 PM    C : Valve Foreign Body Catheter tip ANAEROBIC BACTERIAL CULTURE ROUTINE, AEROBIC BACTERIAL CULTURE ROUTINE Calvin Cabrera MD 1/29/2024  3:56 PM      Findings:   No signs of infection. Entire shunt system removed successfully via scalp incision. Closed with 3-0 running nylon.  Complications: None.  Implants:   Implant Name Type Inv. Item Serial No.  Lot No. LRB No. Used Action   SHUNT CATH VENTRICULAR ANTIBIOTIC-IMPREG KIMBERLY 23CM 56971 - [id number] Shunt SHUNT CATH VENTRICULAR ANTIBIOTIC-IMPREG KIMBERLY 23CM 93004  [employer] [id number] Right 1 Explanted   SHUNT VALVE CERTAS PLUS W / SIPHONGUARD [id number] - [id number] Shunt SHUNT VALVE CERTAS PLUS W / SIPHONGUARD [id number]  [employer] [id number] Right 1 Explanted     Nicole Rinaldi MD, PhD  PGY-2 Neurosurgery    Please contact neurosurgery resident on call with questions.    Dial * * [phone], enter [phone] when prompted.

## 2024-01-29 NOTE — ANESTHESIA POSTPROCEDURE EVALUATION
Patient: Leigh Sloan    Procedure: Procedure(s):  REMOVAL, SHUNT, VENTRICULOPERITONEAL       Anesthesia Type:  General    Note:  Disposition: Inpatient   Postop Pain Control: Uneventful            Sign Out: Well controlled pain   PONV: No   Neuro/Psych: Uneventful            Sign Out: Acceptable/Baseline neuro status   Airway/Respiratory: Uneventful            Sign Out: Acceptable/Baseline resp. status   CV/Hemodynamics: Uneventful            Sign Out: Acceptable CV status; No obvious hypovolemia; No obvious fluid overload   Other NRE: NONE   DID A NON-ROUTINE EVENT OCCUR? No           Last vitals:  Vitals Value Taken Time   /59 01/29/24 1730   Temp 36.1  C (96.98  F) 01/29/24 1739   Pulse 106 01/29/24 1739   Resp 14 01/29/24 1730   SpO2 96 % 01/29/24 1739   Vitals shown include unfiled device data.    Electronically Signed By: Chato Cuba MD  January 29, 2024  5:40 PM

## 2024-01-29 NOTE — ANESTHESIA PROCEDURE NOTES
Airway       Patient location during procedure: OR       Procedure Start/Stop Times: 1/29/2024 2:50 PM  Staff -        Anesthesiologist:  Carlos Medley MD       CRNA: Phil Babb APRN CRNA       Performed By: CRNA  Consent for Airway        Urgency: elective  Indications and Patient Condition       Indications for airway management: lynette-procedural       Induction type:intravenous       Mask difficulty assessment: 1 - vent by mask    Final Airway Details       Final airway type: endotracheal airway       Successful airway: ETT - single and Oral  Endotracheal Airway Details        ETT size (mm): 7.0       Cuffed: yes       Successful intubation technique: direct laryngoscopy       DL Blade Type: MAC 3       Grade View of Cords: 1       Adjucts: stylet       Position: Right       Measured from: gums/teeth       Secured at (cm): 21       Bite block used: None    Post intubation assessment        Placement verified by: capnometry, equal breath sounds and chest rise        Number of attempts at approach: 1       Number of other approaches attempted: 0       Secured with: tape       Ease of procedure: easy       Dentition: Intact    Medication(s) Administered   Medication Administration Time: 1/29/2024 2:50 PM

## 2024-01-30 ENCOUNTER — APPOINTMENT (OUTPATIENT)
Dept: INTERVENTIONAL RADIOLOGY/VASCULAR | Facility: CLINIC | Age: 24
End: 2024-01-30
Payer: COMMERCIAL

## 2024-01-30 LAB
% BASOPHILS, CSF: 1 %
ANION GAP SERPL CALCULATED.3IONS-SCNC: 13 MMOL/L (ref 7–15)
APPEARANCE CSF: ABNORMAL
BUN SERPL-MCNC: 7.9 MG/DL (ref 6–20)
CALCIUM SERPL-MCNC: 8.8 MG/DL (ref 8.6–10)
CHLORIDE SERPL-SCNC: 102 MMOL/L (ref 98–107)
COLOR CSF: COLORLESS
CREAT SERPL-MCNC: 0.67 MG/DL (ref 0.51–0.95)
DEPRECATED HCO3 PLAS-SCNC: 20 MMOL/L (ref 22–29)
EGFRCR SERPLBLD CKD-EPI 2021: >90 ML/MIN/1.73M2
EOSINOPHIL NFR CSF MANUAL: 1 %
ERYTHROCYTE [DISTWIDTH] IN BLOOD BY AUTOMATED COUNT: 13.5 % (ref 10–15)
GLUCOSE CSF-MCNC: 54 MG/DL (ref 40–70)
GLUCOSE SERPL-MCNC: 99 MG/DL (ref 70–99)
HCT VFR BLD AUTO: 33 % (ref 35–47)
HGB BLD-MCNC: 11 G/DL (ref 11.7–15.7)
LYMPH ABN NFR CSF MANUAL: 6 %
MAGNESIUM SERPL-MCNC: 1.7 MG/DL (ref 1.7–2.3)
MCH RBC QN AUTO: 26.9 PG (ref 26.5–33)
MCHC RBC AUTO-ENTMCNC: 33.3 G/DL (ref 31.5–36.5)
MCV RBC AUTO: 81 FL (ref 78–100)
MONOS+MACROS NFR CSF MANUAL: NORMAL %
NEUTROPHILS NFR CSF MANUAL: 93 %
PHOSPHATE SERPL-MCNC: 2.9 MG/DL (ref 2.5–4.5)
PLATELET # BLD AUTO: 353 10E3/UL (ref 150–450)
POTASSIUM SERPL-SCNC: 3.6 MMOL/L (ref 3.4–5.3)
PROT CSF-MCNC: 65 MG/DL (ref 15–45)
RBC # BLD AUTO: 4.09 10E6/UL (ref 3.8–5.2)
RBC # CSF MANUAL: 227 /UL (ref 0–2)
SODIUM SERPL-SCNC: 135 MMOL/L (ref 135–145)
TUBE # CSF: ABNORMAL
WBC # BLD AUTO: 10.1 10E3/UL (ref 4–11)
WBC # CSF MANUAL: 1190 /UL (ref 0–5)

## 2024-01-30 PROCEDURE — 250N000013 HC RX MED GY IP 250 OP 250 PS 637: Performed by: NEUROLOGICAL SURGERY

## 2024-01-30 PROCEDURE — 250N000011 HC RX IP 250 OP 636: Performed by: NEUROLOGICAL SURGERY

## 2024-01-30 PROCEDURE — 999N000128 HC STATISTIC PERIPHERAL IV START W/O US GUIDANCE

## 2024-01-30 PROCEDURE — 87205 SMEAR GRAM STAIN: CPT

## 2024-01-30 PROCEDURE — 009U30Z DRAINAGE OF SPINAL CANAL WITH DRAINAGE DEVICE, PERCUTANEOUS APPROACH: ICD-10-PCS | Performed by: RADIOLOGY

## 2024-01-30 PROCEDURE — 87102 FUNGUS ISOLATION CULTURE: CPT | Performed by: STUDENT IN AN ORGANIZED HEALTH CARE EDUCATION/TRAINING PROGRAM

## 2024-01-30 PROCEDURE — 62329 THER SPI PNXR CSF FLUOR/CT: CPT

## 2024-01-30 PROCEDURE — 89051 BODY FLUID CELL COUNT: CPT

## 2024-01-30 PROCEDURE — 62329 THER SPI PNXR CSF FLUOR/CT: CPT | Mod: GC | Performed by: RADIOLOGY

## 2024-01-30 PROCEDURE — 84157 ASSAY OF PROTEIN OTHER: CPT

## 2024-01-30 PROCEDURE — 250N000013 HC RX MED GY IP 250 OP 250 PS 637

## 2024-01-30 PROCEDURE — 250N000011 HC RX IP 250 OP 636: Performed by: STUDENT IN AN ORGANIZED HEALTH CARE EDUCATION/TRAINING PROGRAM

## 2024-01-30 PROCEDURE — 85027 COMPLETE CBC AUTOMATED: CPT

## 2024-01-30 PROCEDURE — 99254 IP/OBS CNSLTJ NEW/EST MOD 60: CPT | Mod: 24 | Performed by: INTERNAL MEDICINE

## 2024-01-30 PROCEDURE — 250N000009 HC RX 250: Performed by: STUDENT IN AN ORGANIZED HEALTH CARE EDUCATION/TRAINING PROGRAM

## 2024-01-30 PROCEDURE — 36415 COLL VENOUS BLD VENIPUNCTURE: CPT

## 2024-01-30 PROCEDURE — 99152 MOD SED SAME PHYS/QHP 5/>YRS: CPT | Mod: TC

## 2024-01-30 PROCEDURE — 82945 GLUCOSE OTHER FLUID: CPT

## 2024-01-30 PROCEDURE — 80048 BASIC METABOLIC PNL TOTAL CA: CPT

## 2024-01-30 PROCEDURE — 99152 MOD SED SAME PHYS/QHP 5/>YRS: CPT | Mod: GC | Performed by: RADIOLOGY

## 2024-01-30 PROCEDURE — 87075 CULTR BACTERIA EXCEPT BLOOD: CPT

## 2024-01-30 PROCEDURE — 83735 ASSAY OF MAGNESIUM: CPT

## 2024-01-30 PROCEDURE — 84100 ASSAY OF PHOSPHORUS: CPT

## 2024-01-30 PROCEDURE — 120N000002 HC R&B MED SURG/OB UMMC

## 2024-01-30 PROCEDURE — 258N000003 HC RX IP 258 OP 636: Performed by: NEUROLOGICAL SURGERY

## 2024-01-30 PROCEDURE — 250N000011 HC RX IP 250 OP 636

## 2024-01-30 PROCEDURE — C1729 CATH, DRAINAGE: HCPCS

## 2024-01-30 RX ORDER — POTASSIUM CHLORIDE 750 MG/1
20 TABLET, EXTENDED RELEASE ORAL ONCE
Status: COMPLETED | OUTPATIENT
Start: 2024-01-30 | End: 2024-01-30

## 2024-01-30 RX ORDER — CEFEPIME HYDROCHLORIDE 2 G/1
2 INJECTION, POWDER, FOR SOLUTION INTRAVENOUS EVERY 8 HOURS
Status: DISCONTINUED | OUTPATIENT
Start: 2024-01-30 | End: 2024-02-01

## 2024-01-30 RX ORDER — NALOXONE HYDROCHLORIDE 0.4 MG/ML
0.2 INJECTION, SOLUTION INTRAMUSCULAR; INTRAVENOUS; SUBCUTANEOUS
Status: DISCONTINUED | OUTPATIENT
Start: 2024-01-30 | End: 2024-01-30

## 2024-01-30 RX ORDER — ACETAZOLAMIDE 250 MG/1
500 TABLET ORAL
Status: DISCONTINUED | OUTPATIENT
Start: 2024-01-30 | End: 2024-02-03

## 2024-01-30 RX ORDER — FLUMAZENIL 0.1 MG/ML
0.2 INJECTION, SOLUTION INTRAVENOUS
Status: DISCONTINUED | OUTPATIENT
Start: 2024-01-30 | End: 2024-02-08 | Stop reason: HOSPADM

## 2024-01-30 RX ORDER — NALOXONE HYDROCHLORIDE 0.4 MG/ML
0.4 INJECTION, SOLUTION INTRAMUSCULAR; INTRAVENOUS; SUBCUTANEOUS
Status: DISCONTINUED | OUTPATIENT
Start: 2024-01-30 | End: 2024-01-30

## 2024-01-30 RX ORDER — HYDROXYZINE HYDROCHLORIDE 25 MG/1
25 TABLET, FILM COATED ORAL 4 TIMES DAILY PRN
Status: DISCONTINUED | OUTPATIENT
Start: 2024-01-30 | End: 2024-02-09 | Stop reason: HOSPADM

## 2024-01-30 RX ORDER — FENTANYL CITRATE 50 UG/ML
25-50 INJECTION, SOLUTION INTRAMUSCULAR; INTRAVENOUS EVERY 5 MIN PRN
Status: DISCONTINUED | OUTPATIENT
Start: 2024-01-30 | End: 2024-02-08 | Stop reason: HOSPADM

## 2024-01-30 RX ORDER — DIPHENHYDRAMINE HCL 50 MG
50 CAPSULE ORAL 2 TIMES DAILY
Status: DISCONTINUED | OUTPATIENT
Start: 2024-01-30 | End: 2024-02-03

## 2024-01-30 RX ORDER — DIAZEPAM 2 MG
2 TABLET ORAL ONCE
Status: COMPLETED | OUTPATIENT
Start: 2024-01-30 | End: 2024-01-30

## 2024-01-30 RX ORDER — MAGNESIUM OXIDE 400 MG/1
400 TABLET ORAL EVERY 4 HOURS
Status: COMPLETED | OUTPATIENT
Start: 2024-01-30 | End: 2024-01-30

## 2024-01-30 RX ADMIN — OXYCODONE HYDROCHLORIDE 5 MG: 5 TABLET ORAL at 02:35

## 2024-01-30 RX ADMIN — LIDOCAINE HYDROCHLORIDE 30 ML: 10 INJECTION, SOLUTION EPIDURAL; INFILTRATION; INTRACAUDAL; PERINEURAL at 11:21

## 2024-01-30 RX ADMIN — FENTANYL CITRATE 50 MCG: 50 INJECTION INTRAMUSCULAR; INTRAVENOUS at 11:01

## 2024-01-30 RX ADMIN — HYDROMORPHONE HYDROCHLORIDE 0.4 MG: 0.2 INJECTION, SOLUTION INTRAMUSCULAR; INTRAVENOUS; SUBCUTANEOUS at 17:13

## 2024-01-30 RX ADMIN — PROCHLORPERAZINE MALEATE 10 MG: 10 TABLET ORAL at 12:41

## 2024-01-30 RX ADMIN — FENTANYL CITRATE 50 MCG: 50 INJECTION INTRAMUSCULAR; INTRAVENOUS at 10:38

## 2024-01-30 RX ADMIN — DIAZEPAM 2 MG: 2 TABLET ORAL at 09:04

## 2024-01-30 RX ADMIN — POTASSIUM CHLORIDE 20 MEQ: 750 TABLET, EXTENDED RELEASE ORAL at 12:34

## 2024-01-30 RX ADMIN — PANTOPRAZOLE SODIUM 40 MG: 40 TABLET, DELAYED RELEASE ORAL at 08:44

## 2024-01-30 RX ADMIN — METOPROLOL TARTRATE 25 MG: 25 TABLET, FILM COATED ORAL at 20:25

## 2024-01-30 RX ADMIN — FENTANYL CITRATE 50 MCG: 50 INJECTION INTRAMUSCULAR; INTRAVENOUS at 10:27

## 2024-01-30 RX ADMIN — Medication 150 MG: at 22:04

## 2024-01-30 RX ADMIN — HYDROMORPHONE HYDROCHLORIDE 2 MG: 2 TABLET ORAL at 01:06

## 2024-01-30 RX ADMIN — FENTANYL CITRATE 50 MCG: 50 INJECTION INTRAMUSCULAR; INTRAVENOUS at 10:43

## 2024-01-30 RX ADMIN — CEFEPIME HYDROCHLORIDE 2 G: 2 INJECTION, POWDER, FOR SOLUTION INTRAVENOUS at 16:08

## 2024-01-30 RX ADMIN — SENNOSIDES AND DOCUSATE SODIUM 1 TABLET: 8.6; 5 TABLET ORAL at 20:24

## 2024-01-30 RX ADMIN — HYDROMORPHONE HYDROCHLORIDE 0.4 MG: 0.2 INJECTION, SOLUTION INTRAMUSCULAR; INTRAVENOUS; SUBCUTANEOUS at 04:27

## 2024-01-30 RX ADMIN — MIDAZOLAM 1 MG: 1 INJECTION INTRAMUSCULAR; INTRAVENOUS at 10:27

## 2024-01-30 RX ADMIN — MAGNESIUM OXIDE TAB 400 MG (241.3 MG ELEMENTAL MG) 400 MG: 400 (241.3 MG) TAB at 16:09

## 2024-01-30 RX ADMIN — HYDROMORPHONE HYDROCHLORIDE 0.4 MG: 0.2 INJECTION, SOLUTION INTRAMUSCULAR; INTRAVENOUS; SUBCUTANEOUS at 22:29

## 2024-01-30 RX ADMIN — HYDROMORPHONE HYDROCHLORIDE 0.4 MG: 0.2 INJECTION, SOLUTION INTRAMUSCULAR; INTRAVENOUS; SUBCUTANEOUS at 08:37

## 2024-01-30 RX ADMIN — PRAZOSIN HYDROCHLORIDE 2 MG: 2 CAPSULE ORAL at 22:04

## 2024-01-30 RX ADMIN — CYCLOBENZAPRINE 10 MG: 10 TABLET, FILM COATED ORAL at 12:34

## 2024-01-30 RX ADMIN — OXYCODONE HYDROCHLORIDE 5 MG: 5 TABLET ORAL at 16:08

## 2024-01-30 RX ADMIN — MAGNESIUM OXIDE TAB 400 MG (241.3 MG ELEMENTAL MG) 400 MG: 400 (241.3 MG) TAB at 12:34

## 2024-01-30 RX ADMIN — MIDAZOLAM 1 MG: 1 INJECTION INTRAMUSCULAR; INTRAVENOUS at 10:38

## 2024-01-30 RX ADMIN — MIDAZOLAM 2 MG: 1 INJECTION INTRAMUSCULAR; INTRAVENOUS at 10:47

## 2024-01-30 RX ADMIN — VANCOMYCIN HYDROCHLORIDE 1500 MG: 10 INJECTION, POWDER, LYOPHILIZED, FOR SOLUTION INTRAVENOUS at 17:13

## 2024-01-30 RX ADMIN — CEFAZOLIN SODIUM 2 G: 2 INJECTION, SOLUTION INTRAVENOUS at 14:29

## 2024-01-30 RX ADMIN — FENTANYL CITRATE 50 MCG: 50 INJECTION INTRAMUSCULAR; INTRAVENOUS at 10:55

## 2024-01-30 RX ADMIN — ACETAMINOPHEN 975 MG: 325 TABLET, FILM COATED ORAL at 12:34

## 2024-01-30 RX ADMIN — HYDROMORPHONE HYDROCHLORIDE 0.4 MG: 0.2 INJECTION, SOLUTION INTRAMUSCULAR; INTRAVENOUS; SUBCUTANEOUS at 20:25

## 2024-01-30 RX ADMIN — CEFAZOLIN SODIUM 2 G: 2 INJECTION, SOLUTION INTRAVENOUS at 06:36

## 2024-01-30 RX ADMIN — FENTANYL CITRATE 50 MCG: 50 INJECTION INTRAMUSCULAR; INTRAVENOUS at 11:10

## 2024-01-30 RX ADMIN — ACETAZOLAMIDE 500 MG: 250 TABLET ORAL at 16:16

## 2024-01-30 RX ADMIN — MIDAZOLAM 1 MG: 1 INJECTION INTRAMUSCULAR; INTRAVENOUS at 11:01

## 2024-01-30 RX ADMIN — HYDROXYZINE HYDROCHLORIDE 25 MG: 25 TABLET, FILM COATED ORAL at 03:30

## 2024-01-30 RX ADMIN — MECLIZINE 12.5 MG: 12.5 TABLET ORAL at 14:29

## 2024-01-30 RX ADMIN — ONDANSETRON 4 MG: 4 TABLET, ORALLY DISINTEGRATING ORAL at 08:58

## 2024-01-30 RX ADMIN — SERTRALINE HYDROCHLORIDE 75 MG: 50 TABLET ORAL at 22:04

## 2024-01-30 RX ADMIN — HYDROMORPHONE HYDROCHLORIDE 0.4 MG: 0.2 INJECTION, SOLUTION INTRAMUSCULAR; INTRAVENOUS; SUBCUTANEOUS at 14:52

## 2024-01-30 RX ADMIN — ACETAZOLAMIDE 500 MG: 250 TABLET ORAL at 08:58

## 2024-01-30 RX ADMIN — CYCLOBENZAPRINE 10 MG: 10 TABLET, FILM COATED ORAL at 20:24

## 2024-01-30 RX ADMIN — HYDROMORPHONE HYDROCHLORIDE 0.4 MG: 0.2 INJECTION, SOLUTION INTRAMUSCULAR; INTRAVENOUS; SUBCUTANEOUS at 06:32

## 2024-01-30 RX ADMIN — DIPHENHYDRAMINE HYDROCHLORIDE 50 MG: 50 CAPSULE ORAL at 20:20

## 2024-01-30 RX ADMIN — ACETAMINOPHEN 975 MG: 325 TABLET, FILM COATED ORAL at 20:24

## 2024-01-30 RX ADMIN — MIDAZOLAM 1 MG: 1 INJECTION INTRAMUSCULAR; INTRAVENOUS at 11:10

## 2024-01-30 RX ADMIN — DROSPIRENONE AND ETHINYL ESTRADIOL 1 TABLET: KIT at 22:04

## 2024-01-30 RX ADMIN — ACETAMINOPHEN 975 MG: 325 TABLET, FILM COATED ORAL at 02:34

## 2024-01-30 RX ADMIN — METOPROLOL TARTRATE 25 MG: 25 TABLET, FILM COATED ORAL at 08:44

## 2024-01-30 ASSESSMENT — ACTIVITIES OF DAILY LIVING (ADL)
ADLS_ACUITY_SCORE: 23
ADLS_ACUITY_SCORE: 23
ADLS_ACUITY_SCORE: 25
ADLS_ACUITY_SCORE: 22
ADLS_ACUITY_SCORE: 23
ADLS_ACUITY_SCORE: 25
ADLS_ACUITY_SCORE: 22
ADLS_ACUITY_SCORE: 22
ADLS_ACUITY_SCORE: 25
ADLS_ACUITY_SCORE: 25

## 2024-01-30 NOTE — CONSULTS
GENERAL ID ORANGE SERVICE: NEW CONSULTATION  Patient:  Leigh Sloan, Date of birth 2000, Medical record number [id number]  Date of Admission: 1/29/2024  Date of Visit:  1/30/2024  Requesting Provider: Calvin Cabrera         Assessment and Recommendations:   carol ann Sloan is a 22 yo F w/ PMH idiopathic intracranial hypertension, s/p right occipital  shunt (2020), replaced 1/22/2024 who presented to ED with persistent headache and advised to return to [facility] due to c/f Cutibacterium acnes growing from intraoperative cultures during her shunt revision.   shunt removed 1/29. ID was consulted to determine antibiotic duration and timing of  shunt replacement. S/p LP drain for ongoing headache, vertigo, and elevated ICP on 1/30.     Problem List:   Shunt Cutibacterium acnes infection s/p  shunt removal 1/29/23  Neck pain, myalgia, fever, headache and photophobia     Discussion:  It would be appropriate to treat patient's Cutibacterium acnes infection. Appreciate neurosurgery's prompt removal of all hardware on 1/29. Patient would likely require 2 week course of continuous infusion of Penicillin G which is the preferred treatment for Cutibacterium CNS infection with hardware. Alternative treatment would be daily Ceftriaxone for 2 weeks.Typically patient's prefer CTX as this does not require continuous infusion and is more convenient. CTX does work well treating infection, but we acknowledge that this is not preferred treatment and has more side effects. We will discuss risks and benefits of both treatments with patient. Deferred today as pt in acute distress due to neck pain. In regards to timing of  shunt replacement, given CSF findings could explained be attributed to bloody tap and removal of hardware,  shunt can be replaced 3 days after removal pending improvement on antibiotics.    Given patient's new onset fever, severe neck pain, photophobia, headache, and myalgia there is some concern  for iatrogenic meningitis. Though suspicion is low, would recommend repeat CSF culture and meningitis panel. Would recommend starting cefepime and vancomycin following cultures. This regimen would cover Cutibacterium infection. Pending further meningitis workup, will narrow to regimen as above in coming days. Patient's fever and myalgias can be explained by diamox use in setting of hx of high grade fever with sulfa drugs.     Recommendations:  - CSF culture and meningitis panel to evaluate for iatrogenic meningitis   - cefepime and vancomycin following CSF culture   - Will discuss penicillin G vs CTX 2 week antibiotic course with patient   -  shunt can be replaced 3 days following removal pending improvement on antibiotics     Recommendations discussed with Leuck.    Thank you for this consult. ID will continue to follow this patient. Please feel free to call with any questions.     Karen Yeboah MD  Internal Medicine, PGY-2       History of Present Illness:   Leigh Sloan is a 22 yo F w/ PMH idiopathic intracranial hypertension, s/p right occipital  shunt (2020), replaced 1/22/2024 who presented to ED with persistent headache and advised to return to [facility] due to c/f Cutibacterium acnes growing from intraoperative cultures during her shunt revision. She is s/p ancef x 3 for pre-op prophylaxis.  shunt was removed on 1/29/2024. Currently post op day 1. ID was consulted to determine antibiotic duration and timing of  shunt replacement. Post op she developed painful eye movements and new whooshing sensation in back of head 2/2 CSF leak from incision. S/p lumbar drain placement by IR on1/30/2024.     Family present at bedside during visit. Patient reports she was healing well prior to hospitalization. She did have pain, but was healing appropriately. Family states she did not appear ill. She denies fever, chills, cough, congestion, n/v, myalgia, fatigue, diarrhea, or dysuria prior to hospitalization. Post  shunt  removal pt states she has experienced significant photophobia, myalgia, chills, fever (Tmax 101.2F), headache, and neck pain. She states that slight movement causes significant neck pain. Occasionally pain in groin following LP drain placement. During visit pt crying out in pain throughout conversation. She has continued to require opioids without much improvement of pain. Q1h diamox started this morning. Pt reports hx of sulfa drug allergy. Mother states she required 5 day hospitalization as a child due to fever of 103 with TMP SMX use. No hx of hives or anaphylaxis. She does report intermittent peripheral leukocytosis in 2021 which was being worked up by her neurosurgeon in [place]. They were unable to determine cause.          Review of Systems:   Complete 12 point review of systems negative except as noted in HPI.        Past Medical History:     Past Medical History:   Diagnosis Date    Acid reflux     Anxiety     Asthma     Depression     High cholesterol     IIH (idiopathic intracranial hypertension)     Non-alcoholic fatty liver disease     PCOS (polycystic ovarian syndrome)     PTSD (post-traumatic stress disorder)     Scoliosis     Tachycardia          Allergies:      Allergies   Allergen Reactions    Sulfa Antibiotics Unknown    Sulfamethoxazole-Trimethoprim Nausea and Nausea and Vomiting    Trimethoprim Rash           Recent Antimicrobials::   Ancef x 3: 12/30       Family History:   Reviewed and noncontributory.   Family History   Problem Relation Age of Onset    Anxiety Disorder Mother     Ataxia Mother     Depression Mother     Diabetes Mother     Fibromyalgia Mother     Hypertension Mother     Anxiety Disorder Father     Depression Father     Diabetes Father     Anxiety Disorder Sister     Bipolar Disorder Sister     Fibromyalgia Sister     Breast Cancer Paternal Grandmother     Skin Cancer Paternal Grandmother     Colon Cancer Paternal Grandfather     Nystagmus No family hx of     Strabismus No  "family hx of     Amblyopia No family hx of     Glasses (<7 y/o) No family hx of             Social History:     Social History     Socioeconomic History    Marital status: Single     Spouse name: Not on file    Number of children: Not on file    Years of education: Not on file    Highest education level: Not on file   Occupational History    Not on file   Tobacco Use    Smoking status: Never    Smokeless tobacco: Never   Substance and Sexual Activity    Alcohol use: Not Currently    Drug use: Yes     Types: Marijuana     Comment: Gummies on occasion    Sexual activity: Not on file   Other Topics Concern    Not on file   Social History Narrative    Not on file     Social Determinants of Health     Financial Resource Strain: Not on file   Food Insecurity: Not on file   Transportation Needs: Not on file   Physical Activity: Not on file   Stress: Not on file   Social Connections: Not on file   Interpersonal Safety: Not on file   Housing Stability: Not on file              Physical Exam:   BP (!) 149/93 (BP Location: Left leg)   Pulse 108   Temp (!) 101.2  F (38.4  C) (Oral)   Resp 16   Ht 1.651 m (5' 5\")   Wt 115.2 kg (253 lb 15.5 oz)   LMP 12/10/2023 (Within Days)   SpO2 98%   BMI 42.26 kg/m     Exam: limited due to pain and patient deference   GENERAL:  Well-developed, well-nourished, lying in moderate distress  ENT:  Head is normocephalic, R head surgical wound present  EYES unable to assess as pt complaining of photophobia  NECK:  tender to minimal palpation  LUNGS:  Clear to auscultation.  CARDIOVASCULAR:  Tachycardic, Regular rhythm with no murmurs, gallops or rubs.  ABDOMEN:  Normal bowel sounds, soft, mild tenderness to palpation.  EXT: Extremities warm and without edema.  SKIN:  No acute rashes.   NEUROLOGIC:  Grossly nonfocal.       Pertinent Recent Microbiology Data:   No results for input(s): \"CULT\", \"SDES\" in the last 168 hours.         Laboratory Data:     Creatinine   Date Value Ref Range Status " "  01/30/2024 0.67 0.51 - 0.95 mg/dL Final   01/29/2024 0.80 0.51 - 0.95 mg/dL Final   01/23/2024 0.71 0.51 - 0.95 mg/dL Final   01/22/2024 0.79 0.51 - 0.95 mg/dL Final   01/10/2024 0.72 0.51 - 0.95 mg/dL Final     WBC Count   Date Value Ref Range Status   01/30/2024 10.1 4.0 - 11.0 10e3/uL Final   01/29/2024 8.3 4.0 - 11.0 10e3/uL Final   01/23/2024 9.1 4.0 - 11.0 10e3/uL Final   01/22/2024 9.3 4.0 - 11.0 10e3/uL Final   01/10/2024 14.5 (H) 4.0 - 11.0 10e3/uL Final     Hemoglobin   Date Value Ref Range Status   01/30/2024 11.0 (L) 11.7 - 15.7 g/dL Final     Platelet Count   Date Value Ref Range Status   01/30/2024 353 150 - 450 10e3/uL Final     Lab Results   Component Value Date     01/30/2024    BUN 7.9 01/30/2024    CO2 20 (L) 01/30/2024     No results found for: \"CRP\"          Imaging:     Recent Results (from the past 48 hour(s))   CT Head w/o Contrast    Narrative    EXAM: CT HEAD W/O CONTRAST  1/29/2024 6:11 PM     HISTORY: s/p  shunt removal, assess for hemorrhage       COMPARISON: Head CT 1/23/2024, 1/22/2024, 1/3/2024    TECHNIQUE: Using multidetector thin collimation helical acquisition  technique, axial, coronal and sagittal CT images from the skull base  to the vertex were obtained without intravenous contrast.   (topogram) image(s) also obtained and reviewed.    FINDINGS: Interval removal of right posterior parietal approach  ventriculostomy catheter. Mildly increased pneumocephalus involving  the right anterior ventricular horn. No hemorrhage involving the prior  ventriculostomy catheter tract. The ventricular size is otherwise  stable. No new intraventricular or intraparenchymal hemorrhage. No  gray-white matter differentiation loss. No mass effect or midline  shift. The ventricles are proportionate to the cerebral sulci. The  basal cisterns.    Right parietal calvarial defect. There is mild subcutaneous edema and  stranding with subcutaneous emphysema overlying the prior right  parietal " ventriculostomy catheter tract. The paranasal sinuses are  clear. Mastoid air cells are clear. The orbits are unremarkable.      Impression    IMPRESSION:  1. Interval removal of right parietal approach ventriculostomy  catheter without evidence of new intraparenchymal or intraventricular  hemorrhage.  The ventricular size is otherwise stable.  2. Mild subcutaneous edema, fat stranding, and subcutaneous emphysema  involving the previous right parietal ventriculostomy catheter site.    I have personally reviewed the examination and initial interpretation  and I agree with the findings.    ESPINOZA VELAZCO MD         SYSTEM ID:  [id number]   IR Lumbar Drain Placement w Fluoro    Narrative    This exam was marked as non-reportable because it will not be read by a   radiologist or a [place] non-radiologist provider.

## 2024-01-30 NOTE — PLAN OF CARE
"Status: pt POD 1 s/p  shunt removal d/t infection; PMH  shunt implantation 2020 and  revision 1/23.  Vitals: tachy up to 120, baseline. OVSS  Neuros: A&Ox4. Pain in bilateral eyes with movement, MD aware. \"Whooshing feeling\" in posterior head when leaning forward, MD aware. Atarax given for anxiety.   IV: PIV TKO   Resp/trach: LSC on RA  Diet: regular. Strict I/O  Bowel status: BM PTA. BS+  : voiding spont in bathroom. On menses   Skin: R head incision w/ primapore, drainage extended this shift and remarked - MD aware.  Pain: HA managed with PRN/scheduled meds around the clock.  Activity: SBA/GB, utilizes IV pole.   Plan: See below. Continue poc.  Updates this shift: history of having severe allergic reaction to sulfa medications at 5 years old- was hospitalized for 5 days with high temp and vomiting per pt. Has not taken these type of medications since. At risk of allergic reaction to Diamox in relation to this.      0430: MD came to bedside for patient having increased pain and continuing to have the Whooshing feeling in head with movement. IV dilaudid was given by bedside RN. No new orders placed.     0700: NSG notified about concerns for CSF leak. Pt has increased eye sensitivity to light, increased pain with eye movement, and increasing HA. Marker on head dressing is bleeding into the dressing. Incision very painful upon palpitation. Pt is now NPO this AM for possible drain placement.   "

## 2024-01-30 NOTE — PHARMACY-VANCOMYCIN DOSING SERVICE
Pharmacy Vancomycin Initial Note  Date of Service 2024  Patient's  2000  23 year old, female    Indication: Meningitis    Current estimated CrCl = Estimated Creatinine Clearance: 165.5 mL/min (based on SCr of 0.67 mg/dL).    Creatinine for last 3 days  2024: 12:18 PM Creatinine 0.80 mg/dL  2024:  6:55 AM Creatinine 0.67 mg/dL    Recent Vancomycin Level(s) for last 3 days  No results found for requested labs within last 3 days.      Vancomycin IV Administrations (past 72 hours)        No vancomycin orders with administrations in past 72 hours.                    Nephrotoxins and other renal medications (From now, onward)      Start     Dose/Rate Route Frequency Ordered Stop    24 1530  vancomycin (VANCOCIN) 1,500 mg in 0.9% NaCl 250 mL intermittent infusion         1,500 mg  over 90 Minutes Intravenous EVERY 8 HOURS 24 1521              Contrast Orders - past 72 hours (72h ago, onward)      None                Plan:  Start vancomycin  1500 mg IV q8h.   Vancomycin monitoring method: Trough (Method 1 = dosing nomogram)  Vancomycin therapeutic monitoring goal: 15-20 mg/L  Pharmacy will check vancomycin levels as appropriate in 1-3 Days.    Serum creatinine levels will be ordered daily for the first week of therapy and at least twice weekly for subsequent weeks.      Frances Mei, KiyaD

## 2024-01-30 NOTE — PROGRESS NOTES
[facility]     Endovascular Surgical Neuroradiology Pre-Procedure Note      HPI:  Leigh Sloan is a 23 year old woman with history of IIH s/p  shunt which was removed yesterday due to infection, who now has a CSF leak from the incision. Neuro IR is consulted to assist in LD placement for treatment of this CSF leak.    Medical History:  Reviewed    Surgical History:  Reviewed    Family History:  Reviewed    Social History:  Reviewed    Allergies:  Allergies   Allergen Reactions    Sulfa Antibiotics Unknown    Sulfamethoxazole-Trimethoprim Nausea and Nausea and Vomiting    Trimethoprim Rash       Is there a contrast allergy?  No    Medications:  I have reviewed this patient's current medications.    ROS:  The 10 point Review of Systems is negative other than noted in the HPI or here.     PHYSICAL EXAMINATION  Vital Signs:  B/P: 140/76,  T: 99.9,  P: 118,  R: 16    Cardio:  RRR  Pulmonary:  no respiratory distress  Abdomen:  soft, non-tender, non-distended    Neurologic  Mental Status:  fully alert, attentive and oriented, follows commands, speech clear and fluent  Cranial Nerves:  visual fields intact, PERRL, EOMI with normal smooth pursuit, facial sensation intact and symmetric, facial movements symmetric, hearing not formally tested but intact to conversation, palate elevation symmetric and uvula midline, no dysarthria, shoulder shrug strong bilaterally, tongue protrusion midline  Motor:  strength 5/5 throughout upper and lower extremities  Sensory:  intact/symmetric to light touch and pin prick throughout upper and lower extremities  Coordination:  FNF and HS intact without dysmetria    Pre-procedure National Institutes of Health Stroke Scale:   Not applicable    LABS  (most recent Cr, BUN, GFR, PLT, INR, PTT within the past 7 days):  Recent Labs   Lab 01/30/24  0655 01/29/24  1218   CR 0.67 0.80   BUN 7.9 12.5   GFRESTIMATED >90 >90    407   INR  --   0.97   PTT  --  23        Platelet Function P2Y12 (PRU):  Not applicable      ASSESSMENT: CSF leak post- shunt removal    PLAN: Lumbar drain placement under fluoroscopic guidance        PRE-PROCEDURE SEDATION ASSESSMENT     Pre-Procedure Sedation Assessment done at 1030.    Expected Level:  Moderate Sedation    Indication:  Sedation is required to allow for neurointerventional procedure.    Consent obtained from patient after discussing the risks, benefits and alternatives.     PO Intake:  Appropriately NPO for procedure    ASA Class:  Class 2 - MILD SYSTEMIC DISEASE, NO ACUTE PROBLEMS, NO FUNCTIONAL LIMITATIONS.    Mallampati:  Grade 3:  Soft palate visible, posterior pharyngeal wall not visible    History and physical reviewed and no updates needed. I have reviewed the lab findings, diagnostic data, medications, and the plan for sedation. I have determined this patient to be an appropriate candidate for the planned sedation and procedure and have reassessed the patient IMMEDIATELY PRIOR to sedation and procedure.    Patient was discussed with the Attending, Dr. Neely, who agrees with the plan.    Cheryl Orr MD   Pager: [phone]

## 2024-01-30 NOTE — PLAN OF CARE
Arrived from: PACU @ 1920   Belongings/meds: clothing, birth control pills and inhaler labeled in med room, phone, stuffed animal, keys  2 RN Skin Assessment Completed by: Ivy WELLS and Jose Alejandro GAO    Non-intact findings documented (yes/no/NA): R head incision w/ primipore marked drainage    Status: pt POD 0 s/p  shunt removal d/t infection; PMH  shunt implantation 2020 and  revision 1/23,   Vitals: tachy on599t, baseline per pt; OVSS on RA, Capno in place  Neuros: Aox4, 5/5 t/o, c/o int peripheral vision pain improving per pt, Mds aware  IV: PIV infusing NS @ 100  Labs/Electrolytes: WNL  Resp/trach: LSC  Diet: regular, Strict I/O  Bowel status: BS+, LBM PTA  : void spont to BR  Skin: see above  Pain: managed w/ PRN meds  Activity: A1 GB w/ IV pole  Plan: continue to manage pain

## 2024-01-30 NOTE — IR NOTE
Patient Name: Leigh Sloan  Medical Record Number: [id number]  Today's Date: 1/30/2024    Procedure: Lumbar drain  Proceduralist: Dr. Neely, Dr. Orr,   Pathology present: na    Procedure Start: 1053  Procedure end: 1145  Sedation medications administered:   Fentanyl 300 mcg  Versed 6 mg  Sedation time: 78 minutes (1027 - 1145)     Report given to:  Court  : artur    Other Notes: Pt arrived to IR room 3 from . Consent reviewed. Pt denies any questions or concerns regarding procedure. Pt positioned left side down and monitored per protocol.     Pt tolerated procedure without any noted complications.     Lumbar drain secured to back with suture and tape. Drain clamped for transport.     Pt transferred back to .

## 2024-01-30 NOTE — OR NURSING
Neurosurgery team reviewed CT scan. Stated it looks good, patient can transfer to 6A when bed ready.

## 2024-01-30 NOTE — PROGRESS NOTES
"[facility], [place]  Neurosurgery Progress Note:  01/30/2024      Interval History: NAEO. Complaining of pain with eye movements, headache, and a \"whooshing\" feeling in back of head. No significant discharge from incision.    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 She is now POD-1 s/p removal of  shunt.    Clinically Significant Risk Factors Present on Admission                  # Hypertension: Home medication list includes antihypertensive(s)      # Severe Obesity: Estimated body mass index is 42.26 kg/m  as calculated from the following:    Height as of this encounter: 1.651 m (5' 5\").    Weight as of this encounter: 115.2 kg (253 lb 15.5 oz).       # Asthma: noted on problem list       Pre-operative anticoagulation/antiplatelet: none    Plan:  Serial neuro exams  Careful attention to incisional drainage; will consider EVD vs LD if incision begins leaking but hoping to hold off given P. Acnes colonization  ID consult  Diamox 500mg BID, watch for allergic reaction  HOB > 45 degrees  Pain control  Advance diet as tolerated  Bowel regimen  PRN antiemetics  IVF until taking adequate PO  PT/OT  SCDs for DVT proph    -----------------------------------  Nicole Rinaldi MD, PhD  PGY-2 Neurosurgery    Please contact neurosurgery resident on call with questions.    Dial * * *[phone], enter [phone] when prompted.   -----------------------------------    Gen: Appears uncomfortable  Wound: incision dressing with small degree of strikethrough; possible subcutaneous/subgaleal fluid collection under incision  Neurologic:  Alert & Oriented to person, place, time, and situation  Follows commands briskly  Speech fluent, spontaneous. No aphasia or dysarthria.  No gaze preference. No apparent hemineglect.  PERRL, EOMI but painful  Face symmetric with sensation intact to light touch  Palate elevates " symmetrically, uvula midline, tongue protrudes midline  Trapezii muscles 5/5 bilaterally  No pronator drift     Del Tr Bi WE WF Gr   R 5 5 5 5 5 5   L 5 5 5 5 5 5    HF KE KF DF PF EHL   R 5 5 5 5 5 5   L 5 5 5 5 5 5     Reflexes 2+ throughout    Sensation intact and symmetric to light touch throughout    Objective:   Temp:  [96.7  F (35.9  C)-100.5  F (38.1  C)] 99.9  F (37.7  C)  Pulse:  [] 118  Resp:  [1-23] 16  BP: (121-140)/(59-91) 140/76  SpO2:  [94 %-100 %] 98 %  I/O last 3 completed shifts:  In: 2560 [P.O.:1360; I.V.:1200]  Out: 1150 [Urine:1150]        LABS:  Recent Labs   Lab 01/29/24  1218 01/24/24  0600     --    POTASSIUM 4.4  --    CHLORIDE 103  --    CO2 22  --    ANIONGAP 13  --    GLC 96 85   BUN 12.5  --    CR 0.80  --    KEV 9.4  --        Recent Labs   Lab 01/30/24  0655   WBC 10.1   RBC 4.09   HGB 11.0*   HCT 33.0*   MCV 81   MCH 26.9   MCHC 33.3   RDW 13.5          IMAGING:  Recent Results (from the past 24 hour(s))   CT Head w/o Contrast    Narrative    EXAM: CT HEAD W/O CONTRAST  1/29/2024 6:11 PM     HISTORY: s/p  shunt removal, assess for hemorrhage       COMPARISON: Head CT 1/23/2024, 1/22/2024, 1/3/2024    TECHNIQUE: Using multidetector thin collimation helical acquisition  technique, axial, coronal and sagittal CT images from the skull base  to the vertex were obtained without intravenous contrast.   (topogram) image(s) also obtained and reviewed.    FINDINGS: Interval removal of right posterior parietal approach  ventriculostomy catheter. Mildly increased pneumocephalus involving  the right anterior ventricular horn. No hemorrhage involving the prior  ventriculostomy catheter tract. The ventricular size is otherwise  stable. No new intraventricular or intraparenchymal hemorrhage. No  gray-white matter differentiation loss. No mass effect or midline  shift. The ventricles are proportionate to the cerebral sulci. The  basal cisterns.    Right parietal calvarial  defect. There is mild subcutaneous edema and  stranding with subcutaneous emphysema overlying the prior right  parietal ventriculostomy catheter tract. The paranasal sinuses are  clear. Mastoid air cells are clear. The orbits are unremarkable.      Impression    IMPRESSION:  1. Interval removal of right parietal approach ventriculostomy  catheter without evidence of new intraparenchymal or intraventricular  hemorrhage.  The ventricular size is otherwise stable.  2. Mild subcutaneous edema, fat stranding, and subcutaneous emphysema  involving the previous right parietal ventriculostomy catheter site.    I have personally reviewed the examination and initial interpretation  and I agree with the findings.    ESPINOZA VELAZCO MD         SYSTEM ID:  [id number]

## 2024-01-30 NOTE — PLAN OF CARE
Status: Pt on 6A POD #1 s/p  shunt removal d/t infection. Hx of shunt implantation in 2020 and revision in 01/2023. LD placed today d/t CSF leak post-op.  Vitals: VSS on RA ex tachy into 120s and febrile up to 101.2.  Neuros: A&O x4. Photophobia and pain in bilateral eyes. Denies N/T. Generalized weakness d/t pain. Unable to complete a full neuro exam d/t pt refusing parts of assessment d/t pain and photophobia.   IV: PIV TKO.   Labs/Electrolytes: Mag and K replaced, redraw tomorrow AM.   Resp/trach: LS clear. Denies SOB.   Diet: Regular, poor PO intake. PO Zofran and compazine both given x1 for nausea, effective.   Bowel status: BS+. LBM PTA. Passing gas.   : Voids spontaneously via bedside commode.   Skin: R head incision covered with primapore - marked with extension, team aware. LD site covered, CDI.   Pain: C/o severe pain in head, neck, and eyes. Scheduled Tylenol and PRN IV Dilaudid, PO Flexeril, one-time Valium, and meclizine given (for dizziness associated with pain). Pain medications do not seem to be helpful d/t feelings of pressure from CSF leak. Cold packs and dim lighting also utilized d/t photophobia.   Activity: Assist of 1-2, GB, up to bedside commode d/t pain at this time.   Social: Family at bedside, helpful and supportive of pt.   Plan: Pain control. LD management.   Updates this shift: LD placed this shift, order to keep clamped and drain 10 mL Q1h. Diamox started this shift - no reaction noted.   Education completed: LD education done with pt and family; reminders to ensure drain is clamped by RN and to be mindful of tubing.       Goal Outcome Evaluation:    Plan of Care Reviewed With: patient, parent  Overall Patient Progress: no change  Outcome Evaluation: LD placed this shift. Pain uncontrolled.

## 2024-01-30 NOTE — PROVIDER NOTIFICATION
"Provider Notification: NSG Dr. Sanders     0227:  \"Pt continuing to have whooshing feeling in posterior head while leaning forward. Dressing drainage extended. Can you come take a look at it?\"    0230: Call back from Dr. Sanders. Notify MD if dressing is completely saturated with drainage.          "

## 2024-01-30 NOTE — PROGRESS NOTES
[facility]     Endovascular Surgical Neuroradiology Pre-Procedure Note      HPI:  Leigh Sloan is a 23 year old female with medical history of idiopathic intracranial hypertension s/p right occipital ventriculoperitoneal shunt placement that was done 2020 then it was replaced on 1/22/24 with Dr. Conner for elevated ICP due to elevated ICP and papilledema evidence. Cultures obtained during shunt placement grew Cutibacterium acnes and shunt was removed 1/29. Patient was noted to have painful eye movement and a new whooshing sensation in back of head.    Medical History:  Reviewed    Surgical History:  Reviewed    Family History:  Reviewed    Social History:  Reviewed    Allergies:  Reviewed    Is there a contrast allergy?  No    Medications:  I have reviewed this patient's current medications.    ROS:  The 10 point Review of Systems is negative other than noted in the HPI or here.     PHYSICAL EXAMINATION  Vital Signs:  B/P: 140/76,  T: 99.9,  P: 118,  R: 16    Cardio:  RRR  Pulmonary:  no respiratory distress  Abdomen:  soft, non-tender, non-distended, bowel sounds present    Neurologic  Mental Status:  fully alert  Cranial Nerves:  visual fields intact  Motor:  no abnormal movements, normal tone throughout, normal muscle bulk  Sensory:  intact/symmetric to light touch and pin prick throughout upper and lower extremities  Coordination:  FNF and HS intact without dysmetria    Pre-procedure National Institutes of Health Stroke Scale:   Not applicable    LABS  (most recent Cr, BUN, GFR, PLT, INR, PTT within the past 7 days):  Recent Labs   Lab 01/30/24  0655 01/29/24  1218   CR 0.67 0.80   BUN 7.9 12.5   GFRESTIMATED >90 >90    407   INR  --  0.97   PTT  --  23        Platelet Function P2Y12 (PRU):  Not applicable      ASSESSMENT: 23 year female with IIH, new shunt replacement 1/22 then shunt removal 1/29 due to painful eye movements. Here for lumbar  drain today    PLAN: Planned for lumbar drain procedure        PRE-PROCEDURE SEDATION ASSESSMENT     Pre-Procedure Sedation Assessment done at 1023    Expected Level:  Moderate Sedation    Indication:  Sedation is required to allow for neurointerventional procedure.    Consent obtained from patient after discussing the risks, benefits and alternatives.     PO Intake:  Appropriately NPO for procedure    ASA Class:  Class 1 - HEALTHY PATIENT    Mallampati:  Grade 1:  Soft palate, uvula, tonsillar pillars, and posterior pharyngeal wall visible    History and physical reviewed and no updates needed. I have reviewed the lab findings, diagnostic data, medications, and the plan for sedation. I have determined this patient to be an appropriate candidate for the planned sedation and procedure and have reassessed the patient IMMEDIATELY PRIOR to sedation and procedure.    Patient was discussed with the Attending, Dr. Neely who agrees with the plan.    Carrol Mack MD   Neuroendovascular Surgical Neuroradiology Fellow  Pager: [phone]

## 2024-01-30 NOTE — PROCEDURES
[facility]     Endovascular Surgical Neuroradiology Post-Procedure Note    Pre-Procedure Diagnosis:  CSF leak  Post-Procedure Diagnosis:  CSF leak    Procedure(s):   Lumbar drain placement under fluoroscopic guidance    Findings:  Scar tissue at L2-3 and L4-5. Successful lumbar drain placement with blood tinged CSF draining freely flowing at L3-4.    Plan:  Management per [facility]    Primary Surgeon:  Dr. Dimitri Neely  Secondary Surgeon:  Not applicable  Secondary Surgeon Review:  None  Fellow:  Frederick Orr  Additional Assistants:  Gentry ([facility] resident)    Prior to the start of the procedure and with procedural staff participation, I verbally confirmed: the patient s identity using two indicators, relevant allergies, that the procedure was appropriate and matched the consent or emergent situation, and that the correct equipment/implants were available. Immediately prior to starting the procedure I conducted the Time Out with the procedural staff and re-confirmed the patient s name, procedure, and site/side. (The Joint Commission universal protocol was followed.)  Yes    PRU value:  not applicable    Anesthesia:  Conscious Sedation  Medications:   lidocaine 1% 40 ml intradermal, fentanyl 300 mcg IV, midazolam 6 mg IV  Puncture site:   n/a    Fluoroscopy time (minutes):  15.2  Radiation dose (mGy):   1160     Contrast amount (mL):  Not applicable     Estimated blood loss (mL):  <5    Closure:  n/a    Disposition:  Will be followed in hospital by the Neurosurgery team.        Sedation Post-Procedure Summary    Sedatives: Fentanyl and Midazolam (Versed)    Vital signs and pulse oximetry were monitored and remained stable throughout the procedure, and sedation was maintained until the procedure was complete.  The patient was monitored by staff until sedation discharge criteria were met.    Patient tolerance:  Patient tolerated the procedure well with no immediate  complications.    Time of sedation in minutes:  78 minutes from beginning to end of physician one to one monitoring.    Cheryl Orr MD  Pager:  [phone]

## 2024-01-31 ENCOUNTER — DOCUMENTATION ONLY (OUTPATIENT)
Dept: OTHER | Facility: CLINIC | Age: 24
End: 2024-01-31
Payer: COMMERCIAL

## 2024-01-31 ENCOUNTER — HOME INFUSION (PRE-WILLOW HOME INFUSION) (OUTPATIENT)
Dept: PHARMACY | Facility: CLINIC | Age: 24
End: 2024-01-31
Payer: COMMERCIAL

## 2024-01-31 ENCOUNTER — APPOINTMENT (OUTPATIENT)
Dept: PHYSICAL THERAPY | Facility: CLINIC | Age: 24
End: 2024-01-31
Payer: COMMERCIAL

## 2024-01-31 LAB
ANION GAP SERPL CALCULATED.3IONS-SCNC: 11 MMOL/L (ref 7–15)
BACTERIA SPEC CULT: ABNORMAL
BUN SERPL-MCNC: 10.1 MG/DL (ref 6–20)
CALCIUM SERPL-MCNC: 8.7 MG/DL (ref 8.6–10)
CHLORIDE SERPL-SCNC: 107 MMOL/L (ref 98–107)
CREAT SERPL-MCNC: 0.84 MG/DL (ref 0.51–0.95)
DEPRECATED HCO3 PLAS-SCNC: 18 MMOL/L (ref 22–29)
EGFRCR SERPLBLD CKD-EPI 2021: >90 ML/MIN/1.73M2
ENTEROCOCCUS FAECALIS: NOT DETECTED
ENTEROCOCCUS FAECIUM: NOT DETECTED
ERYTHROCYTE [DISTWIDTH] IN BLOOD BY AUTOMATED COUNT: 14 % (ref 10–15)
GLUCOSE SERPL-MCNC: 101 MG/DL (ref 70–99)
HCT VFR BLD AUTO: 30.8 % (ref 35–47)
HGB BLD-MCNC: 10 G/DL (ref 11.7–15.7)
LISTERIA SPECIES (DETECTED/NOT DETECTED): NOT DETECTED
MAGNESIUM SERPL-MCNC: 2 MG/DL (ref 1.7–2.3)
MCH RBC QN AUTO: 27 PG (ref 26.5–33)
MCHC RBC AUTO-ENTMCNC: 32.5 G/DL (ref 31.5–36.5)
MCV RBC AUTO: 83 FL (ref 78–100)
PHOSPHATE SERPL-MCNC: 3.1 MG/DL (ref 2.5–4.5)
PLATELET # BLD AUTO: 281 10E3/UL (ref 150–450)
POTASSIUM SERPL-SCNC: 3.8 MMOL/L (ref 3.4–5.3)
RBC # BLD AUTO: 3.71 10E6/UL (ref 3.8–5.2)
SODIUM SERPL-SCNC: 136 MMOL/L (ref 135–145)
STAPHYLOCOCCUS AUREUS: NOT DETECTED
STAPHYLOCOCCUS EPIDERMIDIS: NOT DETECTED
STAPHYLOCOCCUS LUGDUNENSIS: NOT DETECTED
STAPHYLOCOCCUS SPECIES: NOT DETECTED
STREPTOCOCCUS AGALACTIAE: NOT DETECTED
STREPTOCOCCUS ANGINOSUS GROUP: NOT DETECTED
STREPTOCOCCUS PNEUMONIAE: NOT DETECTED
STREPTOCOCCUS PYOGENES: NOT DETECTED
STREPTOCOCCUS SPECIES: DETECTED
WBC # BLD AUTO: 7.7 10E3/UL (ref 4–11)

## 2024-01-31 PROCEDURE — 85027 COMPLETE CBC AUTOMATED: CPT

## 2024-01-31 PROCEDURE — 272N000451 HC KIT SHRLOCK 5FR POWER PICC DOUBLE LUMEN

## 2024-01-31 PROCEDURE — 36592 COLLECT BLOOD FROM PICC: CPT

## 2024-01-31 PROCEDURE — 97162 PT EVAL MOD COMPLEX 30 MIN: CPT | Mod: GP | Performed by: PHYSICAL THERAPIST

## 2024-01-31 PROCEDURE — 80048 BASIC METABOLIC PNL TOTAL CA: CPT

## 2024-01-31 PROCEDURE — 272N000277 HC DEVICE 4FR SECURACATH

## 2024-01-31 PROCEDURE — 250N000011 HC RX IP 250 OP 636: Performed by: NEUROLOGICAL SURGERY

## 2024-01-31 PROCEDURE — 250N000013 HC RX MED GY IP 250 OP 250 PS 637

## 2024-01-31 PROCEDURE — 99233 SBSQ HOSP IP/OBS HIGH 50: CPT | Mod: 24 | Performed by: INTERNAL MEDICINE

## 2024-01-31 PROCEDURE — 250N000011 HC RX IP 250 OP 636

## 2024-01-31 PROCEDURE — 83735 ASSAY OF MAGNESIUM: CPT

## 2024-01-31 PROCEDURE — 36569 INSJ PICC 5 YR+ W/O IMAGING: CPT

## 2024-01-31 PROCEDURE — 120N000002 HC R&B MED SURG/OB UMMC

## 2024-01-31 PROCEDURE — 36415 COLL VENOUS BLD VENIPUNCTURE: CPT

## 2024-01-31 PROCEDURE — 87040 BLOOD CULTURE FOR BACTERIA: CPT

## 2024-01-31 PROCEDURE — 97530 THERAPEUTIC ACTIVITIES: CPT | Mod: GP | Performed by: PHYSICAL THERAPIST

## 2024-01-31 PROCEDURE — 97116 GAIT TRAINING THERAPY: CPT | Mod: GP | Performed by: PHYSICAL THERAPIST

## 2024-01-31 PROCEDURE — 272N000019 HC KIT OPEN ENDED DOUBLE LUMEN

## 2024-01-31 PROCEDURE — 250N000013 HC RX MED GY IP 250 OP 250 PS 637: Performed by: NEUROLOGICAL SURGERY

## 2024-01-31 PROCEDURE — 84100 ASSAY OF PHOSPHORUS: CPT

## 2024-01-31 PROCEDURE — 258N000003 HC RX IP 258 OP 636: Performed by: NEUROLOGICAL SURGERY

## 2024-01-31 RX ORDER — OXYCODONE HYDROCHLORIDE 5 MG/1
5-10 TABLET ORAL EVERY 4 HOURS PRN
Status: DISCONTINUED | OUTPATIENT
Start: 2024-01-31 | End: 2024-02-08

## 2024-01-31 RX ORDER — HEPARIN SODIUM,PORCINE 10 UNIT/ML
5-20 VIAL (ML) INTRAVENOUS EVERY 24 HOURS
Status: DISCONTINUED | OUTPATIENT
Start: 2024-01-31 | End: 2024-02-09 | Stop reason: HOSPADM

## 2024-01-31 RX ORDER — POTASSIUM CHLORIDE 750 MG/1
20 TABLET, EXTENDED RELEASE ORAL ONCE
Status: COMPLETED | OUTPATIENT
Start: 2024-01-31 | End: 2024-01-31

## 2024-01-31 RX ORDER — HEPARIN SODIUM,PORCINE 10 UNIT/ML
5-20 VIAL (ML) INTRAVENOUS
Status: DISCONTINUED | OUTPATIENT
Start: 2024-01-31 | End: 2024-02-09 | Stop reason: HOSPADM

## 2024-01-31 RX ORDER — MAGNESIUM OXIDE 400 MG/1
400 TABLET ORAL EVERY 4 HOURS
Status: COMPLETED | OUTPATIENT
Start: 2024-01-31 | End: 2024-01-31

## 2024-01-31 RX ORDER — HEPARIN SODIUM 5000 [USP'U]/.5ML
5000 INJECTION, SOLUTION INTRAVENOUS; SUBCUTANEOUS EVERY 8 HOURS
Status: DISCONTINUED | OUTPATIENT
Start: 2024-01-31 | End: 2024-02-08

## 2024-01-31 RX ORDER — LIDOCAINE 40 MG/G
CREAM TOPICAL
Status: ACTIVE | OUTPATIENT
Start: 2024-01-31 | End: 2024-02-03

## 2024-01-31 RX ADMIN — Medication 5 ML: at 22:01

## 2024-01-31 RX ADMIN — PANTOPRAZOLE SODIUM 40 MG: 40 TABLET, DELAYED RELEASE ORAL at 08:06

## 2024-01-31 RX ADMIN — DROSPIRENONE AND ETHINYL ESTRADIOL 1 TABLET: KIT at 21:58

## 2024-01-31 RX ADMIN — SERTRALINE HYDROCHLORIDE 75 MG: 50 TABLET ORAL at 21:57

## 2024-01-31 RX ADMIN — PRAZOSIN HYDROCHLORIDE 2 MG: 2 CAPSULE ORAL at 21:57

## 2024-01-31 RX ADMIN — VANCOMYCIN HYDROCHLORIDE 1500 MG: 10 INJECTION, POWDER, LYOPHILIZED, FOR SOLUTION INTRAVENOUS at 20:01

## 2024-01-31 RX ADMIN — HYDROMORPHONE HYDROCHLORIDE 4 MG: 4 TABLET ORAL at 04:22

## 2024-01-31 RX ADMIN — VANCOMYCIN HYDROCHLORIDE 1500 MG: 10 INJECTION, POWDER, LYOPHILIZED, FOR SOLUTION INTRAVENOUS at 13:14

## 2024-01-31 RX ADMIN — ACETAMINOPHEN 975 MG: 325 TABLET, FILM COATED ORAL at 13:24

## 2024-01-31 RX ADMIN — CEFEPIME HYDROCHLORIDE 2 G: 2 INJECTION, POWDER, FOR SOLUTION INTRAVENOUS at 00:37

## 2024-01-31 RX ADMIN — ACETAZOLAMIDE 500 MG: 250 TABLET ORAL at 09:01

## 2024-01-31 RX ADMIN — Medication 150 MG: at 21:57

## 2024-01-31 RX ADMIN — CEFEPIME HYDROCHLORIDE 2 G: 2 INJECTION, POWDER, FOR SOLUTION INTRAVENOUS at 16:15

## 2024-01-31 RX ADMIN — BUDESONIDE AND FORMOTEROL FUMARATE DIHYDRATE 2 PUFF: 80; 4.5 AEROSOL RESPIRATORY (INHALATION) at 08:07

## 2024-01-31 RX ADMIN — MAGNESIUM OXIDE TAB 400 MG (241.3 MG ELEMENTAL MG) 400 MG: 400 (241.3 MG) TAB at 11:00

## 2024-01-31 RX ADMIN — ACETAZOLAMIDE 500 MG: 250 TABLET ORAL at 18:02

## 2024-01-31 RX ADMIN — CEFEPIME HYDROCHLORIDE 2 G: 2 INJECTION, POWDER, FOR SOLUTION INTRAVENOUS at 08:06

## 2024-01-31 RX ADMIN — POLYETHYLENE GLYCOL 3350 17 G: 17 POWDER, FOR SOLUTION ORAL at 08:06

## 2024-01-31 RX ADMIN — BUDESONIDE AND FORMOTEROL FUMARATE DIHYDRATE 2 PUFF: 80; 4.5 AEROSOL RESPIRATORY (INHALATION) at 19:59

## 2024-01-31 RX ADMIN — OXYCODONE HYDROCHLORIDE 5 MG: 5 TABLET ORAL at 08:23

## 2024-01-31 RX ADMIN — HEPARIN SODIUM 5000 UNITS: 5000 INJECTION, SOLUTION INTRAVENOUS; SUBCUTANEOUS at 10:09

## 2024-01-31 RX ADMIN — ACETAMINOPHEN 975 MG: 325 TABLET, FILM COATED ORAL at 19:59

## 2024-01-31 RX ADMIN — HYDROMORPHONE HYDROCHLORIDE 0.4 MG: 0.2 INJECTION, SOLUTION INTRAMUSCULAR; INTRAVENOUS; SUBCUTANEOUS at 07:06

## 2024-01-31 RX ADMIN — METOPROLOL TARTRATE 25 MG: 25 TABLET, FILM COATED ORAL at 08:09

## 2024-01-31 RX ADMIN — HYDROMORPHONE HYDROCHLORIDE 0.2 MG: 0.2 INJECTION, SOLUTION INTRAMUSCULAR; INTRAVENOUS; SUBCUTANEOUS at 00:25

## 2024-01-31 RX ADMIN — ACETAMINOPHEN 975 MG: 325 TABLET, FILM COATED ORAL at 04:22

## 2024-01-31 RX ADMIN — HYDROXYZINE HYDROCHLORIDE 25 MG: 25 TABLET, FILM COATED ORAL at 00:23

## 2024-01-31 RX ADMIN — SENNOSIDES AND DOCUSATE SODIUM 1 TABLET: 8.6; 5 TABLET ORAL at 19:59

## 2024-01-31 RX ADMIN — DIPHENHYDRAMINE HYDROCHLORIDE 50 MG: 50 CAPSULE ORAL at 08:07

## 2024-01-31 RX ADMIN — POTASSIUM CHLORIDE 20 MEQ: 750 TABLET, EXTENDED RELEASE ORAL at 11:00

## 2024-01-31 RX ADMIN — SENNOSIDES AND DOCUSATE SODIUM 1 TABLET: 8.6; 5 TABLET ORAL at 08:05

## 2024-01-31 RX ADMIN — MAGNESIUM OXIDE TAB 400 MG (241.3 MG ELEMENTAL MG) 400 MG: 400 (241.3 MG) TAB at 15:13

## 2024-01-31 RX ADMIN — VANCOMYCIN HYDROCHLORIDE 1500 MG: 10 INJECTION, POWDER, LYOPHILIZED, FOR SOLUTION INTRAVENOUS at 01:36

## 2024-01-31 RX ADMIN — DIPHENHYDRAMINE HYDROCHLORIDE 50 MG: 50 CAPSULE ORAL at 17:26

## 2024-01-31 RX ADMIN — CEFEPIME HYDROCHLORIDE 2 G: 2 INJECTION, POWDER, FOR SOLUTION INTRAVENOUS at 23:54

## 2024-01-31 RX ADMIN — HEPARIN SODIUM 5000 UNITS: 5000 INJECTION, SOLUTION INTRAVENOUS; SUBCUTANEOUS at 17:26

## 2024-01-31 RX ADMIN — METOPROLOL TARTRATE 25 MG: 25 TABLET, FILM COATED ORAL at 19:59

## 2024-01-31 ASSESSMENT — ACTIVITIES OF DAILY LIVING (ADL)
ADLS_ACUITY_SCORE: 25
ADLS_ACUITY_SCORE: 24
ADLS_ACUITY_SCORE: 25
ADLS_ACUITY_SCORE: 24
ADLS_ACUITY_SCORE: 26
ADLS_ACUITY_SCORE: 25
ADLS_ACUITY_SCORE: 26

## 2024-01-31 NOTE — PROGRESS NOTES
"[facility], [place]  Neurosurgery Progress Note:  01/31/2024      Interval History: Lumbar drain placed yesterday for CSF leak from scalp incision. CSF sent for meningitis workup and vanc/cefepime started per ID recommendations. Improved headache and photophobia this am.    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 She is now POD-2 s/p removal of  shunt, PPD-1 s/p lumbar drain placement.    Clinically Significant Risk Factors                         # Severe Obesity: Estimated body mass index is 42.26 kg/m  as calculated from the following:    Height as of this encounter: 1.651 m (5' 5\").    Weight as of this encounter: 115.2 kg (253 lb 15.5 oz)., PRESENT ON ADMISSION     # Asthma: noted on problem list        Pre-operative anticoagulation/antiplatelet: none    Plan:  Serial neuro exams  ID consult, appreciate assistance  Vanc/cefepime - will need 2 weeks of IV antibiotics  PICC ordered  Shunt replacement when appropriate per ID  Diamox 500mg BID, watch for allergic reaction  HOB > 45 degrees  Pain control  Advance diet as tolerated  Bowel regimen  PRN antiemetics  IVF until taking adequate PO  PT/OT  SCDs for DVT proph, SQH    -----------------------------------  Nicole Rinaldi MD, PhD  PGY-2 Neurosurgery    Please contact neurosurgery resident on call with questions.    Dial * * *[phone], enter [phone] when prompted.   -----------------------------------    Gen: Appears uncomfortable  Wound: incision dressing dry  Neurologic:  Alert & Oriented to person, place, time, and situation  Follows commands briskly  Speech fluent, spontaneous. No aphasia or dysarthria.  No gaze preference. No apparent hemineglect.  PERRL, EOMI but painful  Face symmetric with sensation intact to light touch  Palate elevates symmetrically, uvula midline, tongue protrudes midline  Trapezii muscles 5/5 bilaterally  No " pronator drift     Del Tr Bi WE WF Gr   R 5 5 5 5 5 5   L 5 5 5 5 5 5    HF KE KF DF PF EHL   R 5 5 5 5 5 5   L 5 5 5 5 5 5     Reflexes 2+ throughout    Sensation intact and symmetric to light touch throughout    Objective:   Temp:  [97.7  F (36.5  C)-101.2  F (38.4  C)] 98.3  F (36.8  C)  Pulse:  [] 94  Resp:  [9-33] 18  BP: ()/(53-93) 99/53  SpO2:  [94 %-100 %] 98 %  I/O last 3 completed shifts:  In: 2650 [P.O.:2400; I.V.:250]  Out: 2940 [Urine:2750; Drains:190]        LABS:  Recent Labs   Lab 24  0654 24  0655 24  1218    135 138   POTASSIUM 3.8 3.6 4.4   CHLORIDE 107 102 103   CO2 18* 20* 22   ANIONGAP 11 13 13   * 99 96   BUN 10.1 7.9 12.5   CR 0.84 0.67 0.80   KEV 8.7 8.8 9.4       Recent Labs   Lab 24  0654   WBC 7.7   RBC 3.71*   HGB 10.0*   HCT 30.8*   MCV 83   MCH 27.0   MCHC 32.5   RDW 14.0          IMAGING:  Recent Results (from the past 24 hour(s))   IR Lumbar Drain Placement w Fluoro    Narrative    Procedure Date: 2024 11:37 AM  Patient Name: RIGO RODRÍGUEZ  : 2000    PROCEDURE PERFORMED: Lumbar drain placement under fluoroscopic  guidance.     SURGEON: Dimitri Neely MD    ASSISTANTS:   1. Cheryl Orr MD, Carrol Mack MD  2. Nicole Rinaldi MD, Neurosurgery Resident    ANESTHESIA: Conscious sedation and local anesthesia were provided.     MEDICATIONS: Fentanyl 300 mcg IV, midazolam 6 mg IV, lidocaine 1% 40  mL intradermal.     IV medications were administered by the IR nursing staff under  attending supervision. The IR nursing staff monitored the patient's  vital signs throughout the procedure.     SEDATION TIME (minutes): 78    FLUOROSCOPY TIME (minutes): 15.2  FLUOROSCOPY DOSE (mGy): 1160    INDICATIONS FOR PROCEDURE: CSF leak     DESCRIPTION OF PROCEDURE: The patient was placed in the lateral  position on the angiography table. Her lower back was prepared and  draped in the usual sterile fashion. A timeout was  performed. Using  fluoroscopic guidance, the lumbar spine was visualized. The L2-3  interspace was felt to be favorable. The skin overlying and the needle  tract were infiltrated with 1% lidocaine. A 14-gauge Tuohy needle was  advanced through the skin and fascia in the midline, through the  lumbar interspinous space. The needle was advanced until it reached  the intervertebral foramen. Due to significant scar tissue, the needle  deflected and would not advance in the midline. This process was  repeated at L4-5, and ultimately at L3-4, where the needle was  advanced without deflection or resistance. When the stylet was  removed, CSF returned. The needle was rotated 90 degrees cephalad. The  lumbar drain catheter was advanced through the needle to the T10  vertebral body. The needle and wire were removed without resistance.  The catheter was connected to a closed drainage system. The catheter  was secured to the skin using sutures and a sterile dressing was  applied. Blood loss was less than 5 mL.     REVIEW OF IMAGES:     Series #1: Single fluoroscopic image of the Tuohy needle in the L3-4  interspace with the tip in the intervertebral foramen.    Series #2: Single fluoroscopic image of the lumbar drain catheter  advanced cephalad with the tip above the T11 vertebral body.  ???????????????????????????????????????????????????????????????    Impression    IMPRESSION: Successful placement of lumbar drain under fluoroscopic  guidance.     Cheryl Orr MD  Endovascular Surgical Neuroradiology Fellow  Pager: [phone]

## 2024-01-31 NOTE — PROGRESS NOTES
Therapy: IV abx  Insurance: [facility]     100% coverage for IV abx    In reference to admission on 1/29/24 to check IV abx coverage    Please contact Intake with any questions, [phone] or In Basket pool, [facility] ([phone]).

## 2024-01-31 NOTE — PROGRESS NOTES
Provider Notification:    Data: Received call from ID Lab. Cancelled Test Meningitis PCR.   This test cannot be done from Lumbar Drains.    Action: Team Notified.     Leigh Clark, BSN, RN, PHN, CNRN  Evening Charge Nurse Unit 6A  [phone]

## 2024-01-31 NOTE — PROGRESS NOTES
[facility]      OUTPATIENT PHYSICAL THERAPY EVALUATION  PLAN OF TREATMENT FOR OUTPATIENT REHABILITATION  (COMPLETE FOR INITIAL CLAIMS ONLY)  Patient's Last Name, First Name, M.I.  YOB: 2000  Leigh Sloan                        Provider's Name  [facility] Medical Record No.  [id number]                               Onset Date:  01/22/24   Start of Care Date:  01/22/24      Type:     _X_PT   ___OT   ___SLP Medical Diagnosis:  Shunt revision                        PT Diagnosis:  impaired mobility   Visits from SOC:  1   _________________________________________________________________________________  Plan of Treatment/Functional Goals    Planned Interventions: balance training, bed mobility training, gait training, home exercise program, neuromuscular re-education, patient/family education, postural re-education, ROM (range of motion), stair training, strengthening, stretching, transfer training, risk factor education, home program guidelines, progressive activity/exercise     Goals: See Physical Therapy Goals on Care Plan in [employer] electronic health record.    Therapy Frequency: Daily  Predicted Duration of Therapy Intervention: 01/30/24  _________________________________________________________________________________    I CERTIFY THE NEED FOR THESE SERVICES FURNISHED UNDER        THIS PLAN OF TREATMENT AND WHILE UNDER MY CARE .             Physician Signature               Date    X_____________________________________________________                  Certification date from: 01/22/24, Certification date to: 01/24/24    Referring Physician: Yesica Stevens PA-C            Initial Assessment        See Physical Therapy evaluation dated 01/22/24 in Epic electronic health record.

## 2024-01-31 NOTE — PROCEDURES
[facility]    Double Lumen PICC Placement    Date/Time: 1/31/2024 12:53 PM    Performed by: Swati Starr RN  Authorized by: Calvin Cabrera MD  Indications: vascular access      UNIVERSAL PROTOCOL   Site Marked: Yes  Prior Images Obtained and Reviewed:  Yes  Required items: Required blood products, implants, devices and special equipment available    Patient identity confirmed:  Verbally with patient and arm band  NA - No sedation, light sedation, or local anesthesia  Confirmation Checklist:  Patient's identity using two indicators and relevant allergies  Time out: Immediately prior to the procedure a time out was called    Universal Protocol: the Joint Commission Universal Protocol was followed    Preparation: Patient was prepped and draped in usual sterile fashion       ANESTHESIA    Anesthesia:  Local infiltration  Local Anesthetic:  Lidocaine 1% without epinephrine  Anesthetic Total (mL):  5      SEDATION    Patient Sedated: No        Preparation: skin prepped with ChloraPrep  Skin prep agent: skin prep agent completely dried prior to procedure  Sterile barriers: maximum sterile barriers were used: cap, mask, sterile gown, sterile gloves, and large sterile sheet  Hand hygiene: hand hygiene performed prior to central venous catheter insertion  Type of line used: PICC  Catheter type: double lumen  Lumen type: non-valved and power PICC  Lumen Identification: Purple and Red  Catheter size: 4 Fr  Brand: Bard  Lot number: [id number]  Placement method: venipuncture, MST, ultrasound and tip navigation system  Number of attempts: 1  Difficulty threading catheter: no  Successful placement: yes  Orientation: left  Catheter to Vein (%): 21  Location: basilic vein  Tip Location: SVC  Arm circumference: adults 10 cm  Extremity circumference: 35  Visible catheter length: 3  Total catheter length: 48  Dressing and securement: adhesive securement device,  alcohol impregnated caps, chlorhexidine disc applied, gloves changed prior to final dressing, glue, securement device, site cleansed and subcutaneous anchor securement system  Post procedure assessment: blood return through all ports, free fluid flow and placement verified by 3CG technology  PROCEDURE   Patient Tolerance:  Patient tolerated the procedure well with no immediate complicationsDescribe Procedure: Picc ok to use  Disposal: sharps and needle count correct at the end of procedure, needles and guidewire disposed in sharps container

## 2024-01-31 NOTE — PROVIDER NOTIFICATION
Dr. Gentry barron/ KASSI @ 9229    Leigh sharma - Updated critical result regarding previous positive blood culture from 1/29 2207 - growing streptococcus species. thx Nicole [phone]

## 2024-01-31 NOTE — PLAN OF CARE
Status: pt POD 1 s/p  shunt removal d/t infection; PMH  shunt implantation 2020 and  revision 1/23; LD placed 1/30 draining 10ml/hr   Vitals: tachy [id number], baseline per pt; OVSS on RA  Neuros: Aox4, 5/5 t/o, c/o int peripheral vision pain improving per pt, Mds aware  IV: PIV infusing NS TKO  Labs/Electrolytes: CSF labs sent this shift  Resp/trach: LSC  Diet: regular, Strict I/O  Bowel status: BS+, LBM PTA, bowel meds given  : void spont to BR or BSC  Skin: R head incision covered with primapore w/ marked drainage. LD site covered, CDI.    Pain: managed w/ PRN meds  Activity: A1 GB w/ IV pole, HOB >45  Plan: continue to manage pain  Updates this shift: pt c/o itching and warmth around neck/chest 3 hours afer diamox which is a potenita allergen, Mds notified and benadryl given; MD at bedside to assess LD dressing d/t concern for potnetial drainage at site, no worries at this time, to continue monitoring; IV vanco and cefipime started this shift w/o complication            To NS @ 1916: pt 6208 c/o itching and redness to chest/neck r/t Diamox, can we get Benadryl or something? Thanks Ivy islas MD ordered BID benadryl for 20 min before diamox admin and dose for 1930 1/30    To NSG @ 1932: pt 6208 Luther: I uploaded a pic of pt LD site to [facility], can you look and see if you are concerned for drainage? Thanks Ivy islas MD called back and said will assess at bedside    To NS @ 2203: pt [id number] still c/o itching to head. thanks Ivy islas

## 2024-01-31 NOTE — PROGRESS NOTES
General ID [facility] Service: Follow-up Note      Patient:  Leigh Sloan, Date of birth 2000, Medical record number [id number]  Date of Visit:  January 31, 2024  Reason for consult: Antibiotic regimen for  shunt infection          Assessment and Recommendations:   Leigh Sloan is a 22 yo F w/ PMH idiopathic intracranial hypertension, s/p right occipital  shunt (2020), replaced 1/22/2024 who presented to ED with persistent headache and advised to return to [facility] due to c/f Cutibacterium acnes growing from intraoperative cultures during her shunt revision.   shunt removed 1/29. ID was consulted to determine antibiotic duration and timing of  shunt replacement. S/p LP drain for ongoing headache, vertigo, and elevated ICP on 1/30.      Problem List:   Shunt Cutibacterium acnes infection s/p  shunt removal 1/29/23  S/p LP drain placement on 1/30  Neck pain, myalgia, fever, headache and photophobia   Erythematous neck/chest rash   Blood culture growing GPC in pairs and chains (1 of 2 bottles)    Discussion:  It is unclear if significant improvement is due to diamox, pain control, and/or antibiotics. Unfortunately meningitis panel was unable to be done from lumbar drain. Cultures remain negative. CSF collected on 1/30/24 was colorless, cloudy, 1,190 PMN (93% neutrophils, 6% lymphocytes, 1% eosinophil, 1% basophils), and 227 RBC. Per discussion with neurosurgery, this significant increase in PMNs is not expected post surgically. It is reassuring that cultures did not show positive gram stain and culture remains negative. This makes bacterial meningitis less likely. Though would recommend continuing vancomycin and cefepime until cultures remain negative for 48 hours.     Her erythematous neck/upper chest rash could be due to diamox as previously patient has had high fever (103F) requiring 5 day hospitalization as a child following TMP-SMX use or due to antibiotics. This reaction could also be due to  initiation of antibiotics. Per literature review though there is some evidence of cross reactivity, likelihood of cross reactivity between antibiotic sulfa drugs and non-antibiotic sulfa drugs is low. Would recommend continuing to monitor rash, as if antibiotic induced rash is suspected then would need to change antibiotic regimen.     In regards to Cutibacterium acnes infection, patient would likely require 2 week course of continuous infusion of Penicillin G which is the preferred treatment for Cutibacterium CNS infection with hardware. Alternative treatment would be daily Ceftriaxone for 2 weeks.Typically patient's prefer CTX as this does not require continuous infusion and is more convenient. CTX does work well treating infection, but we acknowledge that this is not preferred treatment and has more side effects. Discussed plan of penicillin G vs CTX with family. Family will discuss with patient once she is more awake. Currently her infection is being covered by vancomycin and cefepime. In regards to timing of  shunt replacement, given repeat CSF findings with elevated WBC with neutrophilic predominance not consistent with post surgical results per neurosurgery. Per neurosurgery tentative plan for  shunt replacement next week.     Incidentally found to have positive blood cultures collected on 1/29 growing GPCs in pairs and chains in 1 of 2 bottles. Suspect this is contamination. Would recommend continuing vanc and cefepime and repeat blood cultures.      Recommendations:  - Continue to follow CSF cultures and blood cultures   -  Continue cefepime and vancomycin until CSF cultures negative for 48 hours and pending repeat blood cultures   - repeat blood culture  - Will narrow to penicillin G vs CTX in coming days for 2 week antibiotic course pending repeat blood cultures.   - Continue to monitor neck/upper chest rash   - Per neurosurgery, tentatively plan for  shunt replacement next week.       Recommendations discussed with Leuck.    Recs given to primary team. We will continue to follow. Thank you for allowing us to participate in the care of this patient. Can call with questions.     Karen Yeboah MD  Internal Medicine, PGY-2   01/31/2024            Interval History:   Overnight pt developed rash and itching around neck and upper chest developed 3 hours post diamox could possibly due to diamox (hx of sulfa allergy) vs antibiotic use. Symptoms improving with prn benadryl use. Pt reports she is somnolent today due to benadryl. She states her headache, neck pain, and myalgias are improved following changes to pain regimen, antibiotics, and diamox. She was able to stand to chair and walk with PT. Previously had significant difficulty due to pain. She remained afebrile and chills resolved overnight. Mother at bedside today. Photophobia has significantly improved today and is able to tolerate lights being on. Denies congestion today. S/p PICC placement today prior to blood cultures returned positive for GPC in cocci in pairs and chains 1/2 bottles.          Current Antimicrobials   Vancomycin and cefepime 2g q8h: 1/30 - present    Past Antimicrobials:   Ancef x 3: 12/30          Physical Exam:   Ranges for vital signs:  Temp:  [97.7  F (36.5  C)-98.5  F (36.9  C)] 97.9  F (36.6  C)  Pulse:  [91-95] 93  Resp:  [16-20] 18  BP: ()/(53-93) 119/62  SpO2:  [98 %-100 %] 98 %    Intake/Output Summary (Last 24 hours) at 1/31/2024 1520  Last data filed at 1/31/2024 0700  Gross per 24 hour   Intake 1290 ml   Output 1810 ml   Net -520 ml     Exam:  GENERAL:  well-developed, well-nourished, lying in bed in no acute distress.   ENT:  Head is normocephalic, atraumatic. Oropharynx is moist without exudates or ulcers.  EYES:  Eyes have anicteric sclerae. No conjunctival injection.   NECK:  Supple.  LUNGS:  Unlabored breathing. Clear to auscultation.  CARDIOVASCULAR:  Regular rate and rhythm with no murmurs, rubs, or  gallops.  ABDOMEN:  Non-distended, soft, nontender.  EXT: Extremities warm and well perfused. No edema.  SKIN:  erythematous rash around neck and chest present. No warmth or pain. Line is in place without any surrounding erythema.  NEUROLOGIC:  Awake, alert, interactive.    Lines:   PICC: 1/31/24 - present          Laboratory Data:       Inflammatory Markers    Recent Labs   Lab Test 01/29/24  2207 01/29/24  1218   SED 26* 25*   CRPI  --  15.00*       Hematology Studies    Recent Labs   Lab Test 01/31/24  0654 01/30/24  0655 01/29/24  1218 01/23/24  0638 01/22/24  0649 01/10/24  1800   WBC 7.7 10.1 8.3 9.1 9.3 14.5*   HGB 10.0* 11.0* 12.7 12.1 12.1 13.0   MCV 83 81 82 84 82 80    353 407 316 296 438       Metabolic Studies     Recent Labs   Lab Test 01/31/24  0654 01/30/24  0655 01/29/24  1218 01/23/24  0638 01/22/24  0649    135 138 140 137   POTASSIUM 3.8 3.6 4.4 4.0 3.9   CHLORIDE 107 102 103 106 104   CO2 18* 20* 22 23 19*   BUN 10.1 7.9 12.5 9.2 14.7   CR 0.84 0.67 0.80 0.71 0.79   GFRESTIMATED >90 >90 >90 >90 >90       Hepatic Studies  No lab results found.    Microbiology:  Culture   Date Value Ref Range Status   01/30/2024 No growth, less than 1 day  Preliminary   01/29/2024 Positive on the 2nd day of incubation (A)  Preliminary   01/29/2024 Gram positive cocci in pairs and chains (AA)  Preliminary     Comment:     1 of 2 bottles   01/29/2024 No anaerobic organisms isolated after 1 day  Preliminary   01/29/2024 No growth after 1 day  Preliminary   01/29/2024 No anaerobic organisms isolated after 1 day  Preliminary   01/29/2024 No growth after 1 day  Preliminary   01/29/2024 No anaerobic organisms isolated after 1 day  Preliminary   01/29/2024 No growth after 1 day  Preliminary   01/29/2024 No growth after 2 days  Preliminary   01/29/2024 No growth after 2 days  Preliminary   01/22/2024 No anaerobic organisms isolated  Final   01/22/2024 No growth after 9 days  Preliminary   01/22/2024 (A)   "Corrected    Isolated in broth only Cutibacterium (Propionibacterium) acnes     Comment:     On day 5 of incubation  Susceptibilities done on previous cultures   01/22/2024 No anaerobic organisms isolated after 9 days  Preliminary   01/22/2024 No growth after 9 days  Preliminary   01/22/2024 No Growth  Final   01/22/2024 (A)  Final    Isolated in broth only Cutibacterium (Propionibacterium) acnes     Comment:     On day 5 of incubation   01/22/2024 No growth after 9 days  Preliminary   01/22/2024 Isolated in broth only Anaerobic diphtheroids (A)  Corrected     Comment:     On day 5 of incubation  See anaerobic report for identification   01/12/2024 50,000-100,000 CFU/mL Mixture of Urogenital Brie  Final       Urine Studies    Recent Labs   Lab Test 01/12/24  1339   LEUKEST Negative   WBCU <1       Vancomycin Levels  No lab results found.    Invalid input(s): \"VANCO\"    Hepatitis B Testing No lab results found.  Hepatitis C Testing   No results found for: \"HCVAB\", \"HQTG\", \"HCGENO\", \"HCPCR\", \"HQTRNA\", \"HEPRNA\"  Respiratory Virus Testing    No results found for: \"RS\", \"FLUAG\"         Imaging:   No new imaging   "

## 2024-01-31 NOTE — PROGRESS NOTES
Home Infusion  Received referral from Jennifer Batista RNCC for IV ABX.  Benefits verified.  Patient has [employer] PMAP and is covered 100%.  Called and spoke with Leigh to review home infusion services, review benefits and offer choice of providers.  Patient would like to remain in the "[employer] [place] system and will use [facility] for home infusion.  Confirmed discharge address, phone, and emergency contact information. Patient denies recent illness (not related to pre-existing conditions) or travel in the house hold. Confirmed allergies. No home health agency has been seeing the patient.     Leigh is willing to learn and manage home IV therapy.  Questions answered.    [facility] will continue to follow until discharge and update pt once final orders are determined.    Thank you for the referral    Gopal Armas LPN, Coordinator   [facility]   [email or web address]  Office: [phone]

## 2024-01-31 NOTE — PHARMACY-ADMISSION MEDICATION HISTORY
Pharmacist Admission Medication History    Admission medication history is complete. The information provided in this note is only as accurate as the sources available at the time of the update.    Information Source(s): Patient and CareEverywhere/SureScripts via phone    Pertinent Information: Patient states they struggle with adherence sometimes.    Changes made to PTA medication list:  Added: None  Deleted: meclizine, metoclopramide, ondansetron, senna  Changed: None      Medication History Completed By: Avery Diane RPH 1/31/2024 3:31 PM    PTA Med List   Medication Sig Last Dose    budesonide-formoterol (SYMBICORT) 80-4.5 MCG/ACT Inhaler Inhale 2 puffs into the lungs as needed     cetirizine (ZYRTEC) 10 MG tablet Take 10 mg by mouth as needed     cyclobenzaprine (FLEXERIL) 10 MG tablet Take 10 mg by mouth 3 times daily as needed     drospirenone-ethinyl estradiol (SAMMY) 3-0.02 MG tablet Take 1 tablet by mouth at bedtime     hydrOXYzine (VISTARIL) 25 MG capsule Take 25 mg by mouth as needed     meclizine (ANTIVERT) 25 MG tablet Take 12.5-25 mg by mouth 3 times daily as needed     meloxicam (MOBIC) 15 MG tablet Take 15 mg by mouth at bedtime     metoprolol succinate ER (TOPROL XL) 50 MG 24 hr tablet Take 1 tablet by mouth at bedtime     omeprazole (PRILOSEC) 20 MG DR capsule Take 20 mg by mouth at bedtime     oxyCODONE (ROXICODONE) 5 MG tablet Take 1 tablet (5 mg) by mouth every 4 hours as needed for moderate pain     polyethylene glycol (MIRALAX) 17 GM/Dose powder Take 17 g by mouth daily     prazosin (MINIPRESS) 2 MG capsule Take 1 capsule by mouth at bedtime     QUEtiapine Fumarate 150 MG TABS Take 1 tablet by mouth at bedtime     sertraline (ZOLOFT) 25 MG tablet Take 75 mg by mouth at bedtime

## 2024-01-31 NOTE — PLAN OF CARE
Status: POD 2 s/p  shunt removal d/t infection; PMH  shunt implantation 2020 and  revision 1/23; LD placed 1/30 draining 10ml/hr    Vitals: VSS on RA. Tachy but < 110  Neuros: A&O x4. Dozing between cares. Sleepy from scheduled Benadryl also. Photophobia, improving. Reports numbness to bilateral legs from knees down x1, resolved with position change. 5/5 strengths t/o with generalized weakness.  IV: L PIV removed. PICC infusing TKO between abx  Labs/Electrolytes: No new labs  Resp/trach: LSC on RA  Diet: Regular- good intake today  Bowel status: LBM PTA  : voids spontaneously. Has menses  Skin:  R head incision covered with primapore w/ marked drainage, dressing reinforced x1 to protect incision from her glasses. LD site covered, CDI.     Pain: Pt c/o of constant headache, but  much better today than yesterday. Pain managed with 5mg prn Oxy and scheduled Tylenol. Order adjusted to allow 5-10mg, but pt has not yet needed.  Cold packs applied to neck and head  Activity: A1- with GB to BR. Walked in adler with PT and sat in chair for 1 hour.  Plan:  LD draining 10cc Q1H. Continue to monitor and follow POC

## 2024-01-31 NOTE — PROGRESS NOTES
01/31/24 0797   Appointment Info   Signing Clinician's Name / Credentials (PT) Ese Karimi, PT, DPT   Living Environment   People in Home other relative(s)  (w/aunt)   Current Living Arrangements apartment   Home Accessibility stairs to enter home;stairs within home   Number of Stairs, Main Entrance 2   Stair Railings, Main Entrance none   Number of Stairs, Within Home, Primary six  (railing)   Living Environment Comments reports having good family support including from pt's aunt and father   Self-Care   Usual Activity Tolerance good   Current Activity Tolerance moderate   Equipment Currently Used at Home none   Fall history within last six months no   Activity/Exercise/Self-Care Comment pt's baseline is IND mobility and ADLs, no AD needs. With recent discharge home after hospitalization pt noted using a walker for 1.5 days, not further. Pt was taking baths, had set-up assist for dressing but was able to IND perform.   General Information   Onset of Illness/Injury or Date of Surgery 01/29/24   Referring Physician Nicole Rinaldi MD   Patient/Family Therapy Goals Statement (PT) Good recovery.   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 She is now POD-2 s/p removal of  shunt, PPD-1 s/p lumbar drain placement.   Existing Precautions/Restrictions fall  (craniotomy; pt has lumbar drain; head of bed > 45 deg per neurosurgery notes)   General Observations pt also reports light sensitivity, but tolerating out of room activities ok this date   Cognition   Cognitive Status Comments Per RN pt on meds affecting alertness> pt was noted to close eyes frequently but was able to follow basic commands; sometimes pt's response time was increased   Pain Assessment   Patient Currently in Pain   (pt reporting B inner thigh pain since  lumbar drain was placed, R worse than L)   Posture    Posture Comments slightly reduced initial upright posture; pt can self-correct   Range of Motion (ROM)   ROM Comment increased pain with heel slides B, more so on R; diffcult to perform SLRs per pain   Strength (Manual Muscle Testing)   Strength Comments slightly reduced functional IND; see below   Bed Mobility   Comment, (Bed Mobility) NT-pt sitting in bed with SBA upon arrival; further up to chair for a meal   Transfers   Comment, (Transfers) CGA-SBA sit<>stand   Gait/Stairs (Locomotion)   Comment, (Gait/Stairs) CGA-slight FREDI w/FWW w/some M/L sway, instability, reduced step length and becca; pt also had some reduced ability to keep eyes open related to medications   Balance   Balance Comments reduced stability, benefits from staff assist, FWW when standing and completing static and dynamic balance tasks   Clinical Impression   Criteria for Skilled Therapeutic Intervention Yes, treatment indicated   PT Diagnosis (PT) impaired mobility   Influenced by the following impairments fatigue, pain, reduced activity tolerance, balance, strength, ROM   Functional limitations due to impairments below baseline bed mobility, transfers, gait   Clinical Presentation (PT Evaluation Complexity) evolving   Clinical Presentation Rationale clinical judgement, [facility]   Clinical Decision Making (Complexity) low complexity   Planned Therapy Interventions (PT) balance training;bed mobility training;gait training;home exercise program;neuromuscular re-education;patient/family education;postural re-education;ROM (range of motion);stair training;strengthening;stretching;transfer training;risk factor education;home program guidelines;progressive activity/exercise   Risk & Benefits of therapy have been explained evaluation/treatment results reviewed;care plan/treatment goals reviewed;risks/benefits reviewed;current/potential barriers reviewed;participants voiced agreement with care  plan;participants included;patient   PT Total Evaluation Time   PT Eval, Moderate Complexity Minutes (32594) 5   Physical Therapy Goals   PT Frequency Daily   PT Goals Bed Mobility;Transfers;Gait;Stairs   PT: Bed Mobility Independent;Supine to/from sit;Rolling;Bridging;Within precautions   PT: Transfers Independent;Sit to/from stand;Bed to/from chair;Assistive device;Within precautions   PT: Gait Modified independent;Independent;150 feet;Within precautions   PT: Stairs 6 stairs;Modified independent;Supervision/stand-by assist   PT Discharge Planning   PT Plan assess bed mobility, progress transfers, gait; monitor for OT needs   PT Discharge Recommendation (DC Rec) home with assist   PT Rationale for DC Rec Pt has good support, currently with some reduced activity tolerance and ability to focus related to medical status; anticipate ongoing improvements to allow safe discharge home with family assist.   PT Brief overview of current status Ax1 for transfers, gait; have pt utilize FWW for added stability   Total Session Time   Timed Code Treatment Minutes 35   Total Session Time (sum of timed and untimed services) 40

## 2024-01-31 NOTE — PROVIDER NOTIFICATION
"  MD Sanders paged at [phone] re:    \"Luther in 6208 on 6A-pt reporting numbness to bilat legs from knees down FYI.  [place] [phone]\"  "

## 2024-01-31 NOTE — CONSULTS
Care Management Initial Consult    General Information  Assessment completed with: Patient, Parents, Patient and Mother, Greta  Type of CM/SW Visit: Initial Assessment    Primary Care Provider verified and updated as needed: Yes   Readmission within the last 30 days: yes  Return Category: Post-op/Post-procedure complication     Advance Care Planning: Advance Care Planning Reviewed: no concerns identified          Communication Assessment  Patient's communication style: spoken language (English or Bilingual)    Hearing Difficulty or Deaf: no   Wear Glasses or Blind: yes    Cognitive  Cognitive/Neuro/Behavioral: .WDL except  Level of Consciousness: alert  Arousal Level: opens eyes spontaneously  Orientation: oriented x 4  Mood/Behavior: anxious, cooperative  Best Language: 0 - No aphasia  Speech: clear, spontaneous, logical    Living Environment:   People in home: other relative(s) (Pt lives with Aunt)  Ese  Current living Arrangements: house      Able to return to prior arrangements: yes       Family/Social Support:  Care provided by: self,  (Aunt)  Provides care for:  No one  Marital Status: Single         Description of Support System: Involved, Supportive         Current Resources:   Patient receiving home care services: No     Community Resources: None  Equipment currently used at home: walker, rolling  Supplies currently used at home:      Employment/Financial:  Employment Status:          Financial Concerns:   None identified       Lifestyle & Psychosocial Needs:  Social Determinants of Health     Food Insecurity: Not on file   Depression: Not at risk (1/10/2024)    PHQ-2     PHQ-2 Score: 0   Housing Stability: Not on file   Tobacco Use: Low Risk  (1/22/2024)    Patient History     Smoking Tobacco Use: Never     Smokeless Tobacco Use: Never     Passive Exposure: Not on file   Financial Resource Strain: Not on file   Alcohol Use: Not on file   Transportation Needs: Not on file   Physical Activity: Not on  file   Interpersonal Safety: Not on file   Stress: Not on file   Social Connections: Not on file       Functional Status:  Prior to admission patient was independent with ambulation and ADLs             Mental Health Status: No current concerns          Chemical Dependency Status: No current concerns                Values/Beliefs:  Spiritual, Cultural Beliefs, Restoration Practices, Values that affect care:                 Additional Information:  Patient with recent history of ventriculoperitoneal shunt replacement on 1/22/24, readmitted on 1/29/24. Patient had Lumbar drain placed yesterday for CSF leak and is currently on IV antibiotics.  I have met with Patient and her MotherGreta to introduce myself and care coordinator role. Patients Mother lives in [place], Patient lives with her Aunt in [place], at baseline she is independent. Per Neurosurgery, Patient will require IV antibiotics post discharge, unclear of length of hospital stay. Patient is confident she can learn IV administration with assist from Aunt.  I have made a referral to [facility], #[phone],  RNCC will follow.    Addendum  Physical Therapy has evaluated Patient with recommendation of home with assist.    Cyn A. Cheryle, RN  6A care coordinator #[phone]

## 2024-01-31 NOTE — PLAN OF CARE
Status: POD 2 s/p  shunt removal d/t infection; PMH  shunt implantation 2020 and  revision 1/23; LD placed 1/30 draining 10ml/hr    Vitals: VSS on RA. Tachy but < 110  Neuros: A&O x4. Photophobia, improving. Reports numbness to bilateral legs from knees down, team paged. 5/5 strengths t/o with generalized weakness.  IV: L PIV infusing TKO between abx  Labs/Electrolytes: No new labs  Resp/trach: LSC on RA  Diet: Regular  Bowel status: LBM PTA  : voids spontaneously  Skin:  R head incision covered with primapore w/ marked drainage, dressing reinforced. LD site covered, CDI.     Pain: Pt c/o of constant headache. 5-6/10 pain managed with PRN IV and PO dilaudid. Atarax given X1. Cold packs applied to neck and head  Activity: A1-2 with GB, pivot to bedside commode  Plan: pain management. LD draining 10cc Q1H. Continue to monitor and follow POC       Goal Outcome Evaluation:      Plan of Care Reviewed With: patient    Overall Patient Progress: improvingOverall Patient Progress: improving

## 2024-02-01 ENCOUNTER — APPOINTMENT (OUTPATIENT)
Dept: PHYSICAL THERAPY | Facility: CLINIC | Age: 24
End: 2024-02-01
Payer: COMMERCIAL

## 2024-02-01 LAB
ANION GAP SERPL CALCULATED.3IONS-SCNC: 11 MMOL/L (ref 7–15)
APPEARANCE CSF: CLEAR
BUN SERPL-MCNC: 10.8 MG/DL (ref 6–20)
CALCIUM SERPL-MCNC: 8.9 MG/DL (ref 8.6–10)
CHLORIDE SERPL-SCNC: 111 MMOL/L (ref 98–107)
COLOR CSF: COLORLESS
CREAT SERPL-MCNC: 0.76 MG/DL (ref 0.51–0.95)
DEPRECATED HCO3 PLAS-SCNC: 17 MMOL/L (ref 22–29)
EGFRCR SERPLBLD CKD-EPI 2021: >90 ML/MIN/1.73M2
ERYTHROCYTE [DISTWIDTH] IN BLOOD BY AUTOMATED COUNT: 14.1 % (ref 10–15)
GLUCOSE CSF-MCNC: 68 MG/DL (ref 40–70)
GLUCOSE SERPL-MCNC: 98 MG/DL (ref 70–99)
HCT VFR BLD AUTO: 32.6 % (ref 35–47)
HGB BLD-MCNC: 10.5 G/DL (ref 11.7–15.7)
LYMPH ABN NFR CSF MANUAL: 56 %
MAGNESIUM SERPL-MCNC: 2.1 MG/DL (ref 1.7–2.3)
MCH RBC QN AUTO: 27.1 PG (ref 26.5–33)
MCHC RBC AUTO-ENTMCNC: 32.2 G/DL (ref 31.5–36.5)
MCV RBC AUTO: 84 FL (ref 78–100)
MONOS+MACROS NFR CSF MANUAL: 26 %
NEUTROPHILS NFR CSF MANUAL: 18 %
PHOSPHATE SERPL-MCNC: 3.1 MG/DL (ref 2.5–4.5)
PLATELET # BLD AUTO: 291 10E3/UL (ref 150–450)
POTASSIUM SERPL-SCNC: 3.9 MMOL/L (ref 3.4–5.3)
PROT CSF-MCNC: 51.2 MG/DL (ref 15–45)
RBC # BLD AUTO: 3.88 10E6/UL (ref 3.8–5.2)
RBC # CSF MANUAL: 102 /UL (ref 0–2)
SODIUM SERPL-SCNC: 139 MMOL/L (ref 135–145)
TUBE # CSF: ABNORMAL
WBC # BLD AUTO: 7.4 10E3/UL (ref 4–11)
WBC # CSF MANUAL: 192 /UL (ref 0–5)

## 2024-02-01 PROCEDURE — 97116 GAIT TRAINING THERAPY: CPT | Mod: GP | Performed by: PHYSICAL THERAPIST

## 2024-02-01 PROCEDURE — 258N000003 HC RX IP 258 OP 636: Performed by: NEUROLOGICAL SURGERY

## 2024-02-01 PROCEDURE — 80048 BASIC METABOLIC PNL TOTAL CA: CPT

## 2024-02-01 PROCEDURE — 250N000013 HC RX MED GY IP 250 OP 250 PS 637: Performed by: NEUROLOGICAL SURGERY

## 2024-02-01 PROCEDURE — 250N000011 HC RX IP 250 OP 636

## 2024-02-01 PROCEDURE — 250N000011 HC RX IP 250 OP 636: Performed by: NEUROLOGICAL SURGERY

## 2024-02-01 PROCEDURE — 250N000013 HC RX MED GY IP 250 OP 250 PS 637

## 2024-02-01 PROCEDURE — 97530 THERAPEUTIC ACTIVITIES: CPT | Mod: GP | Performed by: PHYSICAL THERAPIST

## 2024-02-01 PROCEDURE — 83735 ASSAY OF MAGNESIUM: CPT

## 2024-02-01 PROCEDURE — 89051 BODY FLUID CELL COUNT: CPT

## 2024-02-01 PROCEDURE — 99233 SBSQ HOSP IP/OBS HIGH 50: CPT | Mod: 24 | Performed by: INTERNAL MEDICINE

## 2024-02-01 PROCEDURE — 36592 COLLECT BLOOD FROM PICC: CPT

## 2024-02-01 PROCEDURE — 82945 GLUCOSE OTHER FLUID: CPT

## 2024-02-01 PROCEDURE — 120N000002 HC R&B MED SURG/OB UMMC

## 2024-02-01 PROCEDURE — 250N000013 HC RX MED GY IP 250 OP 250 PS 637: Performed by: NURSE PRACTITIONER

## 2024-02-01 PROCEDURE — 84157 ASSAY OF PROTEIN OTHER: CPT

## 2024-02-01 PROCEDURE — 84100 ASSAY OF PHOSPHORUS: CPT

## 2024-02-01 PROCEDURE — 999N000111 HC STATISTIC OT IP EVAL DEFER

## 2024-02-01 PROCEDURE — 85027 COMPLETE CBC AUTOMATED: CPT

## 2024-02-01 RX ORDER — CEFTRIAXONE 2 G/1
2 INJECTION, POWDER, FOR SOLUTION INTRAMUSCULAR; INTRAVENOUS EVERY 12 HOURS
Status: DISCONTINUED | OUTPATIENT
Start: 2024-02-01 | End: 2024-02-09 | Stop reason: HOSPADM

## 2024-02-01 RX ADMIN — HEPARIN SODIUM 5000 UNITS: 5000 INJECTION, SOLUTION INTRAVENOUS; SUBCUTANEOUS at 00:59

## 2024-02-01 RX ADMIN — POLYETHYLENE GLYCOL 3350 17 G: 17 POWDER, FOR SOLUTION ORAL at 09:00

## 2024-02-01 RX ADMIN — SENNOSIDES AND DOCUSATE SODIUM 1 TABLET: 8.6; 5 TABLET ORAL at 09:00

## 2024-02-01 RX ADMIN — SERTRALINE HYDROCHLORIDE 75 MG: 50 TABLET ORAL at 21:11

## 2024-02-01 RX ADMIN — DROSPIRENONE AND ETHINYL ESTRADIOL 1 TABLET: KIT at 21:11

## 2024-02-01 RX ADMIN — DIPHENHYDRAMINE HYDROCHLORIDE 50 MG: 50 CAPSULE ORAL at 19:58

## 2024-02-01 RX ADMIN — HEPARIN SODIUM 5000 UNITS: 5000 INJECTION, SOLUTION INTRAVENOUS; SUBCUTANEOUS at 09:01

## 2024-02-01 RX ADMIN — DIPHENHYDRAMINE HYDROCHLORIDE 50 MG: 50 CAPSULE ORAL at 07:38

## 2024-02-01 RX ADMIN — BUDESONIDE AND FORMOTEROL FUMARATE DIHYDRATE 2 PUFF: 80; 4.5 AEROSOL RESPIRATORY (INHALATION) at 19:58

## 2024-02-01 RX ADMIN — OXYCODONE HYDROCHLORIDE 5 MG: 5 TABLET ORAL at 22:20

## 2024-02-01 RX ADMIN — ACETAMINOPHEN 650 MG: 325 TABLET, FILM COATED ORAL at 22:20

## 2024-02-01 RX ADMIN — ACETAMINOPHEN 975 MG: 325 TABLET, FILM COATED ORAL at 03:22

## 2024-02-01 RX ADMIN — Medication 150 MG: at 21:11

## 2024-02-01 RX ADMIN — HEPARIN SODIUM 5000 UNITS: 5000 INJECTION, SOLUTION INTRAVENOUS; SUBCUTANEOUS at 17:46

## 2024-02-01 RX ADMIN — ACETAZOLAMIDE 500 MG: 250 TABLET ORAL at 09:01

## 2024-02-01 RX ADMIN — Medication 10 ML: at 21:14

## 2024-02-01 RX ADMIN — SENNOSIDES AND DOCUSATE SODIUM 1 TABLET: 8.6; 5 TABLET ORAL at 19:58

## 2024-02-01 RX ADMIN — ACETAMINOPHEN 975 MG: 325 TABLET, FILM COATED ORAL at 13:26

## 2024-02-01 RX ADMIN — PANTOPRAZOLE SODIUM 40 MG: 40 TABLET, DELAYED RELEASE ORAL at 07:39

## 2024-02-01 RX ADMIN — BUDESONIDE AND FORMOTEROL FUMARATE DIHYDRATE 2 PUFF: 80; 4.5 AEROSOL RESPIRATORY (INHALATION) at 08:59

## 2024-02-01 RX ADMIN — PRAZOSIN HYDROCHLORIDE 2 MG: 2 CAPSULE ORAL at 21:11

## 2024-02-01 RX ADMIN — METOPROLOL TARTRATE 25 MG: 25 TABLET, FILM COATED ORAL at 19:58

## 2024-02-01 RX ADMIN — VANCOMYCIN HYDROCHLORIDE 1500 MG: 10 INJECTION, POWDER, LYOPHILIZED, FOR SOLUTION INTRAVENOUS at 04:21

## 2024-02-01 RX ADMIN — CEFEPIME HYDROCHLORIDE 2 G: 2 INJECTION, POWDER, FOR SOLUTION INTRAVENOUS at 08:59

## 2024-02-01 RX ADMIN — ACETAZOLAMIDE 500 MG: 250 TABLET ORAL at 20:16

## 2024-02-01 RX ADMIN — CEFTRIAXONE SODIUM 2 G: 2 INJECTION, POWDER, FOR SOLUTION INTRAMUSCULAR; INTRAVENOUS at 15:09

## 2024-02-01 RX ADMIN — METOPROLOL TARTRATE 25 MG: 25 TABLET, FILM COATED ORAL at 09:00

## 2024-02-01 ASSESSMENT — ACTIVITIES OF DAILY LIVING (ADL)
ADLS_ACUITY_SCORE: 24
ADLS_ACUITY_SCORE: 26
ADLS_ACUITY_SCORE: 22
ADLS_ACUITY_SCORE: 24
ADLS_ACUITY_SCORE: 22
ADLS_ACUITY_SCORE: 24
ADLS_ACUITY_SCORE: 24
ADLS_ACUITY_SCORE: 22
ADLS_ACUITY_SCORE: 22
ADLS_ACUITY_SCORE: 24

## 2024-02-01 NOTE — PLAN OF CARE
Goal Outcome Evaluation:      Plan of Care Reviewed With: patient    Overall Patient Progress: improvingOverall Patient Progress: improving    Outcome Evaluation: LD, draining 10cc well Q1H, pain managed much better today    Status: POD 2 s/p  shunt removal d/t infection; PMH  shunt implantation 2020 and  revision 1/23; LD placed 1/30 draining 10ml/hr, abx course for infection and possible shunt replacement   Vitals: VSS on RA, intermittent tacky  Neuros: AOx4, pt resting in between cares, photophobia improving per pt, denied n/t this tutu, generalized weakness  IV: DL PICC-red infusing TKO btw abx, purple HL  Labs/Electrolytes: Critical lab result notified from lab- blood culture positive for streptococcus from 1/29, team notified  Resp/trach: WNL, denies SOB  Diet: Regular, good intake  Bowel status: BM this tutu  : Voiding spont in bathroom  Skin: R head incision covered with primapore w/ marked drainage, dressing reinforced x1 previous shift to protect incision from her glasses. LD site covered, CDI.   Pain: Pt stated headache improved from yesterday, highest rating was a 4 but did not want pain meds  Activity: Ax1 GB   Social: Family at the bedside today, lot of questions-neurosurgery addressed as well as infectious disease team  Plan: Continue to monitor and POC, continue to drain 10mL from LD  Updates this shift: Updates to family and pt by neurosurgery, pt will continue to take benadryl 20 minutes prior to Diamox and monitor for side effects,     Pt: Leigh Sloan [id number]  Pt family at the bedside and have a lot of questions and wanted to speak to the team. Did you have time to speak to them? Thanks! Gricelda [phone]

## 2024-02-01 NOTE — PROGRESS NOTES
"Care Management Follow Up    Length of Stay (days): 3    Expected Discharge Date:  TBD     Concerns to be Addressed: discharge planning  Patient plan of care discussed at interdisciplinary rounds: Yes    Anticipated Discharge Disposition: Home   Anticipated Discharge Services:  Home Infusion  Anticipated Discharge DME:  None    Patient/family educated on Medicare website which has current facility and service quality ratings:  No  Education Provided on the Discharge Plan: Yes    Patient/Family in Agreement with the Plan:  Yes    Referrals Placed by CM/SW:  [facility]  Private pay costs discussed: Not applicable    Additional Information:  Patient status reviewed, discussed in discharge rounds. Per provider, tentative plan for  shunt replacement on Monday or Thursday of next week. Per ID notes, patient will require 2 weeks of IV antibiotics.    Referral was sent to [facility] yesterday to check benefits. Per [facility]: \"Pt has 100% coverage for IV abx with BCBS PMAP plan.\" [facility] liaison updated on plan.    RNCC will continue to follow.    [facility] - home IV antibiotics  Ph: [phone]  Fax: [phone]    Jennifer Batista RN, BSN  6A RN Care Coordinator  Ph: [phone]   Pager: [phone]  "

## 2024-02-01 NOTE — PROGRESS NOTES
Home Infusion  Leigh is expected to discharge in a few days or early next week and will be going home on IV abx; either continuous PCN or rocephin q 12 hrs both of which are covered by her insurance.  She has never done home IV therapy and will benefit with some information on what to expect and how the abxs are administered.  Met with Leigh at bedside and provided her with information about IV abx therapy with [facility] services.  Explained about administration method which will be IVP over 10 min for the rocephin and can be via the elastomeric ball (or cadd pump) for the continuous PCN.  Showed her the teaching materials/supplies and explained that we can provide teaching either at the hospital or at home for either abx.  Informed her about supplies and delivery of supplies, storage of medication, dosing time/s and plan for home SNV.      Leigh verbalized understanding of all information given.   She is willing and able to learn and manage home IV therapy but may want her aunt to learn as well.  Questions answered.  Will continue to follow and update pt with more information as appropriate.  Terra STEWART  Nurse Liaison  [email or web address]  [address] [place] [address]  [email or web address]  [phone] MJosF PERLA [facility] main: [phone]

## 2024-02-01 NOTE — PLAN OF CARE
Goal Outcome Evaluation:      Plan of Care Reviewed With: patient    Overall Patient Progress: improvingOverall Patient Progress: improving    Outcome Evaluation: Good pain control.  LD draining 10cc/hr.    Status: POD 3 s/p  shunt removal d/t infection; PMH  shunt implantation 2020 and  revision 1/23; LD placed 1/30 draining 10ml/hr, abx course for infection and possible shunt replacement    Vitals: VSS on RA.    Neuros: A&Ox4.  Sleeping between cares.  Photophobia improving per pt.  Generalized weakness.  IV: L DL PICC - red infusing TKO btw abx.  Purple HL  Resp/trach: WNL.  Denies SOB  Diet: regular diet  Bowel status: LBM 1/31  : voiding spontaneously in bathroom  Skin: R head incision covered w/ primapore w/ marked drainage.  Dressing reinforced PRN.  LD site covered, CDI.    Pain: Headache improved from yesterday, per patient.  Managed with tylenol this shift.  Activity: A1/GB  Plan: Continue to drain 10mL from LD Q1hr.  Continue to monitor pain.  Updates this shift: Per previous shift report: pt will continue to take benadryl 20 minutes prior to Diamox, and be monitored for side effects.

## 2024-02-01 NOTE — PROGRESS NOTES
[facility]: Follow-up Note      Patient:  Leigh Sloan, Date of birth 2000, Medical record number [id number]  Date of Visit:  February 1, 2024  Reason for consult:  Shunt infection          Assessment and Recommendations:   Leigh Sloan is a 22 yo F w/ PMH idiopathic intracranial hypertension, s/p right occipital  shunt (2020), replaced 1/22/2024 who presented to ED with persistent headache and advised to return to [facility] due to c/f Cutibacterium acnes growing from intraoperative cultures during her shunt revision.   shunt removed 1/29. ID was consulted to determine antibiotic duration and timing of  shunt replacement. S/p LP drain for ongoing headache, vertigo, and elevated ICP on 1/30.      Problem List:   Shunt Cutibacterium acnes infection s/p  shunt removal 1/29/23  S/p LP drain placement on 1/30  Neck pain, myalgia, fever, headache and photophobia, improved  Erythematous neck/chest rash, resolved  1 out of 3 Blood culture growing from 1/29 growing Streptococcus Sanguinis (1 of 2 bottles) c/f transient bacteremia      Discussion:  Her erythematous neck/upper chest rash has resolved which is reassuring. This could be due to diamox or antibiotics. Will continue to monitor. CSF cultures remain negative. CSF collected on 1/30/24 was colorless, cloudy, 1,190 PMN (93% neutrophils, 6% lymphocytes, 1% eosinophil, 1% basophils), and 227 RBC. Per discussion with neurosurgery, this significant increase in PMNs is not expected post surgically. It is reassuring that cultures did not show positive gram stain and culture remains negative. This makes bacterial meningitis less likely. We are okay with tentative plan to replace  shunt on 2/5 as this would be total 7 days from removal, which is currently preferred given elevated neutrophils on CSF. Would recommend repeat CSF cell count with diff. No need for repeat CSF cultures. Would recommend narrowing from vanc and cefepime to CTX 2g  q12h. Recommend 2 week course of Penicillin G vs CTX outpatient.       Incidentally found to have positive blood cultures collected on 1/29 growing Strep sanguinous in 1 of 2 bottles. 1 of 3 cultures collected on 1/29 was positive. Others remain with NGTD. Oddly this is not a typical contaminant. Suspect this could represent transient bacteremia, which could explain myalgia and fever which resolved following antibiotics. It is reassuring that repeat cultures have remained negative.  Would recommend 2 week course of antibiotics from first negative blood culture (1/31). Pending susceptibilities, antibiotic choice is in between CTX and PCN G. This regimen would cover  shunt infection and possible transient bacteremia. Okay to leave PICC in as cultures from day of placement remain negative. If positive, then will have to be replaced with line holiday.     Recommendations:  - Continue to follow CSF cultures and blood cultures   -  Switch Vanc and Cefepime -> CTX 2g q12h  - no need for repeat blood culture   - Will consider switching to PCN G pending insurance coverage, pt preference, and blood culture susceptibilities   - Recommend total 14 day course from last negative blood culture (1/31 - 2/13)  - Discuss with social work to start process of setting up home infusion to determine if PCN G vs CTX is covered by insurance.   - Recommend repeat CSF sample for cell count and WBC diff to ensure neutrophils are down trending as pt's presentation is unusual post op. No need for repeat CSF cultures.   - Per neurosurgery, tentatively plan for  shunt replacement 2/5, which would be 7 days following  shunt removal. This is preferred in setting of elevated WBC in CSF.   - Okay to keep PICC in as cultures from 1/31 remain negative.      Recommendations discussed with Leuck.     Recs given to primary team. We will continue to follow. Thank you for allowing us to participate in the care of this patient. Can call with questions.      Recs given to primary team. Thank you for allowing us to participate in the care of this patient. Can call with questions.    Karen Yeboah MD  Internal Medicine, PGY-2   02/01/2024            Interval History:   NAOE. Pt currently doing well. Photophobia has resolved. Pain under control. Itching has resolved with benadryl. Rash has resolved. She states she feels well. Denies fevers, chills, N/V, diarrhea, or dysuria.          Current Antimicrobials   Vancomycin and cefepime 2g q8h: 1/30 - present     Past Antimicrobials:   Ancef x 3: 12/30            Physical Exam:   Ranges for vital signs:  Temp:  [97.9  F (36.6  C)-98.9  F (37.2  C)] 98.1  F (36.7  C)  Pulse:  [69-93] 77  Resp:  [16-18] 16  BP: ()/(43-62) 93/46  SpO2:  [98 %-100 %] 99 %    Intake/Output Summary (Last 24 hours) at 2/1/2024 1244  Last data filed at 2/1/2024 1000  Gross per 24 hour   Intake 610 ml   Output 320 ml   Net 290 ml     Exam:  GENERAL:  well-developed, well-nourished, lying in bed in no acute distress.   ENT:  Head is normocephalic, atraumatic. Oropharynx is moist without exudates or ulcers.  EYES:  Eyes have anicteric sclerae. No conjunctival injection.   NECK:  Supple.  LUNGS:  Unlabored breathing. Clear to auscultation.  CARDIOVASCULAR:  Regular rate and rhythm with no murmurs, rubs, or gallops.  ABDOMEN:  Non-distended, soft, nontender.  EXT: Extremities warm and well perfused. No edema.  SKIN:  erythematous rash around neck and chest has resolved. No warmth or pain. Posterior cranial incision clean, dry and intact. Lumbar drain site without erythema, warmth, or induration.   surrounding erythema.  NEUROLOGIC:  Awake, alert, interactive.         Laboratory Data:       Inflammatory Markers    Recent Labs   Lab Test 01/29/24  2207 01/29/24  1218   SED 26* 25*   CRPI  --  15.00*       Hematology Studies    Recent Labs   Lab Test 02/01/24  0743 01/31/24  0654 01/30/24  0655 01/29/24  1218 01/23/24  0638 01/22/24  0649   WBC 7.4  7.7 10.1 8.3 9.1 9.3   HGB 10.5* 10.0* 11.0* 12.7 12.1 12.1   MCV 84 83 81 82 84 82    281 353 407 316 296       Metabolic Studies     Recent Labs   Lab Test 02/01/24  0743 01/31/24  0654 01/30/24  0655 01/29/24  1218 01/23/24  0638    136 135 138 140   POTASSIUM 3.9 3.8 3.6 4.4 4.0   CHLORIDE 111* 107 102 103 106   CO2 17* 18* 20* 22 23   BUN 10.8 10.1 7.9 12.5 9.2   CR 0.76 0.84 0.67 0.80 0.71   GFRESTIMATED >90 >90 >90 >90 >90       Hepatic Studies  No lab results found.    Microbiology:  Culture   Date Value Ref Range Status   01/31/2024 No growth after 12 hours  Preliminary   01/31/2024 No growth after 12 hours  Preliminary   01/30/2024 No anaerobic organisms isolated after 1 day  Preliminary   01/30/2024 No growth after 1 day  Preliminary   01/30/2024 No growth after 1 day  Preliminary   01/29/2024 Positive on the 2nd day of incubation (A)  Preliminary   01/29/2024 Streptococcus sanguinis (AA)  Preliminary     Comment:     1 of 2 bottles   01/29/2024 No anaerobic organisms isolated after 2 days  Preliminary   01/29/2024 No growth after 2 days  Preliminary   01/29/2024 No anaerobic organisms isolated after 2 days  Preliminary   01/29/2024 No growth after 2 days  Preliminary   01/29/2024 No anaerobic organisms isolated after 2 days  Preliminary   01/29/2024 No growth after 2 days  Preliminary   01/29/2024 No growth after 2 days  Preliminary   01/29/2024 No growth after 2 days  Preliminary   01/22/2024 No anaerobic organisms isolated  Final   01/22/2024 No growth after 10 days  Preliminary   01/22/2024 (A)  Corrected    Isolated in broth only Cutibacterium (Propionibacterium) acnes     Comment:     On day 5 of incubation  Susceptibilities done on previous cultures   01/22/2024 No anaerobic organisms isolated after 10 days  Preliminary   01/22/2024 No growth after 10 days  Preliminary   01/22/2024 No Growth  Final   01/22/2024 (A)  Final    Isolated in broth only Cutibacterium (Propionibacterium)  "acnes     Comment:     On day 5 of incubation   01/22/2024 No growth after 10 days  Preliminary   01/22/2024 Isolated in broth only Anaerobic diphtheroids (A)  Corrected     Comment:     On day 5 of incubation  See anaerobic report for identification   01/12/2024 50,000-100,000 CFU/mL Mixture of Urogenital Brie  Final       Urine Studies    Recent Labs   Lab Test 01/12/24  1339   LEUKEST Negative   WBCU <1       Vancomycin Levels  No lab results found.    Invalid input(s): \"VANCO\"    Hepatitis B Testing No lab results found.  Hepatitis C Testing   No results found for: \"HCVAB\", \"HQTG\", \"HCGENO\", \"HCPCR\", \"HQTRNA\", \"HEPRNA\"  Respiratory Virus Testing    No results found for: \"RS\", \"FLUAG\"         Imaging:     No new imaging   "

## 2024-02-01 NOTE — CONSULTS
Occupational Therapy: Orders received. Chart reviewed and discussed with care team.? Occupational Therapy not indicated due to pt is IND with ADLs and presenting without cognitive deficits per chart review and interdisciplinary collaboration. PT is following for post surgical precautions and strengthening/endurance.? Anticipate another crani next week and if new needs arise, can put in another OT consult. Defer discharge recommendations to PT at this time.? Will complete orders.

## 2024-02-01 NOTE — PROGRESS NOTES
"[facility], [place]  Neurosurgery Progress Note:  02/01/2024      Interval History: Lumbar drain placed yesterday for CSF leak from scalp incision. CSF sent for meningitis workup and vanc/cefepime started per ID recommendations. Improved headache and photophobia this am.    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 She is now POD-3 s/p removal of  shunt, PPD-2 s/p lumbar drain placement.    Clinically Significant Risk Factors                         # Severe Obesity: Estimated body mass index is 42.26 kg/m  as calculated from the following:    Height as of this encounter: 1.651 m (5' 5\").    Weight as of this encounter: 115.2 kg (253 lb 15.5 oz)., PRESENT ON ADMISSION     # Asthma: noted on problem list        Pre-operative anticoagulation/antiplatelet: none    Plan:  Serial neuro exams  Continue lumbar drainage 10/hr  ID consult, appreciate assistance  Vanc/cefepime - will need 2 weeks of IV antibiotics, continue this regimen until CSF cultures negative for 48 hours (2/1 pm)  PICC placed  Follow repeat blood cultures  Shunt replacement when appropriate per ID  Diamox 500mg BID, watch for allergic reaction  HOB > 45 degrees  Pain control  Advance diet as tolerated  Bowel regimen  PRN antiemetics  IVF until taking adequate PO  PT/OT  SCDs for DVT proph, SQH    -----------------------------------  Nicole Rinaldi MD, PhD  PGY-2 Neurosurgery    Please contact neurosurgery resident on call with questions.    Dial * * *[phone], enter [phone] when prompted.   -----------------------------------    Gen: Appears mildly uncomfortable  Wound: incision clean dry intact  Neurologic:  Alert & Oriented to person, place, time, and situation  Follows commands briskly  Speech fluent, spontaneous. No aphasia or dysarthria.  No gaze preference. No apparent hemineglect.  PERRL, EOMI but painful  Face symmetric " with sensation intact to light touch  Palate elevates symmetrically, uvula midline, tongue protrudes midline  Trapezii muscles 5/5 bilaterally  No pronator drift     Del Tr Bi WE WF Gr   R 5 5 5 5 5 5   L 5 5 5 5 5 5    HF KE KF DF PF EHL   R 5 5 5 5 5 5   L 5 5 5 5 5 5     Reflexes 2+ throughout    Sensation intact and symmetric to light touch throughout    Objective:   Temp:  [97.9  F (36.6  C)-98.9  F (37.2  C)] 98.1  F (36.7  C)  Pulse:  [69-93] 77  Resp:  [16-18] 16  BP: ()/(43-62) 93/46  SpO2:  [98 %-100 %] 99 %  I/O last 3 completed shifts:  In: 510 [P.O.:500; I.V.:10]  Out: 340 [Urine:100; Drains:240]        LABS:  Recent Labs   Lab 02/01/24  0743 01/31/24  0654 01/30/24  0655    136 135   POTASSIUM 3.9 3.8 3.6   CHLORIDE 111* 107 102   CO2 17* 18* 20*   ANIONGAP 11 11 13   GLC 98 101* 99   BUN 10.8 10.1 7.9   CR 0.76 0.84 0.67   KEV 8.9 8.7 8.8       Recent Labs   Lab 02/01/24  0743   WBC 7.4   RBC 3.88   HGB 10.5*   HCT 32.6*   MCV 84   MCH 27.1   MCHC 32.2   RDW 14.1          IMAGING:  No results found for this or any previous visit (from the past 24 hour(s)).

## 2024-02-02 ENCOUNTER — APPOINTMENT (OUTPATIENT)
Dept: CT IMAGING | Facility: CLINIC | Age: 24
End: 2024-02-02
Attending: NURSE PRACTITIONER
Payer: COMMERCIAL

## 2024-02-02 LAB
ANION GAP SERPL CALCULATED.3IONS-SCNC: 12 MMOL/L (ref 7–15)
BACTERIA BLD CULT: ABNORMAL
BACTERIA BLD CULT: ABNORMAL
BUN SERPL-MCNC: 10.6 MG/DL (ref 6–20)
CALCIUM SERPL-MCNC: 9 MG/DL (ref 8.6–10)
CHLORIDE SERPL-SCNC: 110 MMOL/L (ref 98–107)
CREAT SERPL-MCNC: 0.74 MG/DL (ref 0.51–0.95)
DEPRECATED HCO3 PLAS-SCNC: 17 MMOL/L (ref 22–29)
EGFRCR SERPLBLD CKD-EPI 2021: >90 ML/MIN/1.73M2
ERYTHROCYTE [DISTWIDTH] IN BLOOD BY AUTOMATED COUNT: 13.9 % (ref 10–15)
GLUCOSE SERPL-MCNC: 88 MG/DL (ref 70–99)
HCT VFR BLD AUTO: 32.8 % (ref 35–47)
HGB BLD-MCNC: 10.6 G/DL (ref 11.7–15.7)
MAGNESIUM SERPL-MCNC: 1.9 MG/DL (ref 1.7–2.3)
MCH RBC QN AUTO: 27.2 PG (ref 26.5–33)
MCHC RBC AUTO-ENTMCNC: 32.3 G/DL (ref 31.5–36.5)
MCV RBC AUTO: 84 FL (ref 78–100)
PHOSPHATE SERPL-MCNC: 3.8 MG/DL (ref 2.5–4.5)
PLATELET # BLD AUTO: 291 10E3/UL (ref 150–450)
POTASSIUM SERPL-SCNC: 3.8 MMOL/L (ref 3.4–5.3)
RBC # BLD AUTO: 3.9 10E6/UL (ref 3.8–5.2)
SODIUM SERPL-SCNC: 139 MMOL/L (ref 135–145)
WBC # BLD AUTO: 7.7 10E3/UL (ref 4–11)

## 2024-02-02 PROCEDURE — 36592 COLLECT BLOOD FROM PICC: CPT

## 2024-02-02 PROCEDURE — 250N000011 HC RX IP 250 OP 636

## 2024-02-02 PROCEDURE — 250N000013 HC RX MED GY IP 250 OP 250 PS 637: Performed by: NEUROLOGICAL SURGERY

## 2024-02-02 PROCEDURE — 70450 CT HEAD/BRAIN W/O DYE: CPT

## 2024-02-02 PROCEDURE — 250N000013 HC RX MED GY IP 250 OP 250 PS 637

## 2024-02-02 PROCEDURE — 70450 CT HEAD/BRAIN W/O DYE: CPT | Mod: 26 | Performed by: RADIOLOGY

## 2024-02-02 PROCEDURE — 250N000013 HC RX MED GY IP 250 OP 250 PS 637: Performed by: NURSE PRACTITIONER

## 2024-02-02 PROCEDURE — 80048 BASIC METABOLIC PNL TOTAL CA: CPT

## 2024-02-02 PROCEDURE — 120N000002 HC R&B MED SURG/OB UMMC

## 2024-02-02 PROCEDURE — 99233 SBSQ HOSP IP/OBS HIGH 50: CPT | Mod: 24 | Performed by: INTERNAL MEDICINE

## 2024-02-02 PROCEDURE — 84100 ASSAY OF PHOSPHORUS: CPT

## 2024-02-02 PROCEDURE — 83735 ASSAY OF MAGNESIUM: CPT

## 2024-02-02 PROCEDURE — 85027 COMPLETE CBC AUTOMATED: CPT

## 2024-02-02 RX ORDER — POTASSIUM CHLORIDE 750 MG/1
20 TABLET, EXTENDED RELEASE ORAL ONCE
Status: COMPLETED | OUTPATIENT
Start: 2024-02-02 | End: 2024-02-02

## 2024-02-02 RX ORDER — MAGNESIUM OXIDE 400 MG/1
400 TABLET ORAL EVERY 4 HOURS
Status: COMPLETED | OUTPATIENT
Start: 2024-02-02 | End: 2024-02-02

## 2024-02-02 RX ADMIN — SENNOSIDES AND DOCUSATE SODIUM 1 TABLET: 8.6; 5 TABLET ORAL at 08:05

## 2024-02-02 RX ADMIN — PRAZOSIN HYDROCHLORIDE 2 MG: 2 CAPSULE ORAL at 22:04

## 2024-02-02 RX ADMIN — Medication 5 ML: at 06:50

## 2024-02-02 RX ADMIN — MAGNESIUM OXIDE TAB 400 MG (241.3 MG ELEMENTAL MG) 400 MG: 400 (241.3 MG) TAB at 11:59

## 2024-02-02 RX ADMIN — DIPHENHYDRAMINE HYDROCHLORIDE 50 MG: 50 CAPSULE ORAL at 19:24

## 2024-02-02 RX ADMIN — BUDESONIDE AND FORMOTEROL FUMARATE DIHYDRATE 2 PUFF: 80; 4.5 AEROSOL RESPIRATORY (INHALATION) at 20:04

## 2024-02-02 RX ADMIN — SERTRALINE HYDROCHLORIDE 75 MG: 50 TABLET ORAL at 22:03

## 2024-02-02 RX ADMIN — PANTOPRAZOLE SODIUM 40 MG: 40 TABLET, DELAYED RELEASE ORAL at 08:05

## 2024-02-02 RX ADMIN — OXYCODONE HYDROCHLORIDE 5 MG: 5 TABLET ORAL at 08:21

## 2024-02-02 RX ADMIN — HEPARIN SODIUM 5000 UNITS: 5000 INJECTION, SOLUTION INTRAVENOUS; SUBCUTANEOUS at 03:04

## 2024-02-02 RX ADMIN — METOPROLOL TARTRATE 25 MG: 25 TABLET, FILM COATED ORAL at 08:16

## 2024-02-02 RX ADMIN — SENNOSIDES AND DOCUSATE SODIUM 1 TABLET: 8.6; 5 TABLET ORAL at 20:03

## 2024-02-02 RX ADMIN — HEPARIN SODIUM 5000 UNITS: 5000 INJECTION, SOLUTION INTRAVENOUS; SUBCUTANEOUS at 17:16

## 2024-02-02 RX ADMIN — HEPARIN SODIUM 5000 UNITS: 5000 INJECTION, SOLUTION INTRAVENOUS; SUBCUTANEOUS at 09:56

## 2024-02-02 RX ADMIN — ACETAMINOPHEN 650 MG: 325 TABLET, FILM COATED ORAL at 17:15

## 2024-02-02 RX ADMIN — Medication 150 MG: at 22:03

## 2024-02-02 RX ADMIN — CEFTRIAXONE SODIUM 2 G: 2 INJECTION, POWDER, FOR SOLUTION INTRAMUSCULAR; INTRAVENOUS at 14:33

## 2024-02-02 RX ADMIN — ACETAMINOPHEN 650 MG: 325 TABLET, FILM COATED ORAL at 08:21

## 2024-02-02 RX ADMIN — OXYCODONE HYDROCHLORIDE 5 MG: 5 TABLET ORAL at 17:17

## 2024-02-02 RX ADMIN — DIPHENHYDRAMINE HYDROCHLORIDE 50 MG: 50 CAPSULE ORAL at 08:16

## 2024-02-02 RX ADMIN — CEFTRIAXONE SODIUM 2 G: 2 INJECTION, POWDER, FOR SOLUTION INTRAMUSCULAR; INTRAVENOUS at 03:05

## 2024-02-02 RX ADMIN — METOPROLOL TARTRATE 25 MG: 25 TABLET, FILM COATED ORAL at 20:02

## 2024-02-02 RX ADMIN — ACETAZOLAMIDE 500 MG: 250 TABLET ORAL at 08:58

## 2024-02-02 RX ADMIN — Medication 5 ML: at 17:16

## 2024-02-02 RX ADMIN — ACETAZOLAMIDE 500 MG: 250 TABLET ORAL at 20:03

## 2024-02-02 RX ADMIN — MAGNESIUM OXIDE TAB 400 MG (241.3 MG ELEMENTAL MG) 400 MG: 400 (241.3 MG) TAB at 14:33

## 2024-02-02 RX ADMIN — DROSPIRENONE AND ETHINYL ESTRADIOL 1 TABLET: KIT at 22:03

## 2024-02-02 RX ADMIN — BUDESONIDE AND FORMOTEROL FUMARATE DIHYDRATE 2 PUFF: 80; 4.5 AEROSOL RESPIRATORY (INHALATION) at 08:59

## 2024-02-02 RX ADMIN — POTASSIUM CHLORIDE 20 MEQ: 750 TABLET, EXTENDED RELEASE ORAL at 11:59

## 2024-02-02 ASSESSMENT — VISUAL ACUITY
OU: NORMAL ACUITY;GLASSES
OU: NORMAL ACUITY;GLASSES

## 2024-02-02 ASSESSMENT — ACTIVITIES OF DAILY LIVING (ADL)
ADLS_ACUITY_SCORE: 22

## 2024-02-02 NOTE — PROGRESS NOTES
"[facility], [place]  Neurosurgery Progress Note:  02/02/2024      Interval History: Feeling well this am. Minor headache. CSF resent with significant improvement in laboratory values. Continues on ceftriaxone.    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 She is now POD-4 s/p removal of  shunt, PPD-3 s/p lumbar drain placement.    Clinically Significant Risk Factors                         # Severe Obesity: Estimated body mass index is 42.26 kg/m  as calculated from the following:    Height as of this encounter: 1.651 m (5' 5\").    Weight as of this encounter: 115.2 kg (253 lb 15.5 oz)., PRESENT ON ADMISSION     # Asthma: noted on problem list        Pre-operative anticoagulation/antiplatelet: none    Plan:  Serial neuro exams  Continue lumbar drainage 10/hr  ID consult, appreciate assistance  Ceftriaxone - 2 week course  PICC placed  Follow repeat blood cultures  Shunt replacement planned for 2/8 with general surgery  Diamox 500mg BID, watch for allergic reaction  HOB > 45 degrees  Pain control  Advance diet as tolerated  Bowel regimen  PRN antiemetics  IVF until taking adequate PO  PT/OT  SCDs for DVT proph, SQH    -----------------------------------  Nicole Rinaldi MD, PhD  PGY-2 Neurosurgery    Please contact neurosurgery resident on call with questions.    Dial * * *[phone], enter [phone] when prompted.   -----------------------------------    Gen: Appears mildly uncomfortable  Wound: incision clean dry intact, appropriately healing  Neurologic:  Alert & Oriented to person, place, time, and situation  Follows commands briskly  Speech fluent, spontaneous. No aphasia or dysarthria.  No gaze preference. No apparent hemineglect.  PERRL, EOMI but painful  Face symmetric with sensation intact to light touch  Palate elevates symmetrically, uvula midline, tongue protrudes midline  Trapezii muscles " 5/5 bilaterally  No pronator drift     Del Tr Bi WE WF Gr   R 5 5 5 5 5 5   L 5 5 5 5 5 5    HF KE KF DF PF EHL   R 5 5 5 5 5 5   L 5 5 5 5 5 5     Reflexes 2+ throughout    Sensation intact and symmetric to light touch throughout    Objective:   Temp:  [98  F (36.7  C)-98.8  F (37.1  C)] 98.2  F (36.8  C)  Pulse:  [79-90] 85  Resp:  [16-18] 18  BP: ()/(45-66) 110/61  SpO2:  [98 %-100 %] 100 %  I/O last 3 completed shifts:  In: 460 [P.O.:360; I.V.:100]  Out: 223 [Drains:223]        LABS:  Recent Labs   Lab 02/02/24  0657 02/01/24  0743 01/31/24  0654    139 136   POTASSIUM 3.8 3.9 3.8   CHLORIDE 110* 111* 107   CO2 17* 17* 18*   ANIONGAP 12 11 11   GLC 88 98 101*   BUN 10.6 10.8 10.1   CR 0.74 0.76 0.84   KEV 9.0 8.9 8.7       Recent Labs   Lab 02/02/24  0657   WBC 7.7   RBC 3.90   HGB 10.6*   HCT 32.8*   MCV 84   MCH 27.2   MCHC 32.3   RDW 13.9          IMAGING:  No results found for this or any previous visit (from the past 24 hour(s)).

## 2024-02-02 NOTE — PROVIDER NOTIFICATION
"  MD Sanders paged at [phone] re:    Harmony in 5444 on 6A-BP 98/45, HR in 80's.  Pt lightheaded and feels like she is \"on a cloud\".  ANTIONETTE Espinoza [phone]  "

## 2024-02-02 NOTE — PLAN OF CARE
Status: Pt s/p  shunt removal d/t infection on 1/29, LD placed 1/30   Vitals: VSS on RA  Neuros: AO4, strength 5/5 throughout. Intermittent numbness/tingling to BLE, MD aware, specifically R heel and L foot. Mild photophobia   IV: DL PICC with one port HL, second TKO with IV abx  Labs/Electrolytes: Mag and potassium replaced this shift   Resp/trach: LS diminished in the bases, URRUTIA  Diet: Regular diet, tolerating well   Bowel status: BM 2/1  : Voiding spontaneously   Skin: Crani incision ROSIE with sutures, LD site with drainage, MD aware and came to assess. LD with 10 mL/hr out. Held for 2 hours as pt with increased dizziness.   Pain: Well controlled with PRN Tylenol and Oxycodone  Activity: Up with assist of 1, GB. Ambulated halls x 1  Plan: Benadryl ordered 20 minutes prior to Diamox, and be monitored for side effects. 2 weeks of IV antibiotics. Plan for  shunt insertion 2/8 @ 0730 with Dr. Cabrera. Continue to monitor and follow POC  Updates this shift: CT completed this shift.   Education completed: Needing PICC education with aunt at discharge.

## 2024-02-02 NOTE — PLAN OF CARE
Goal Outcome Evaluation:      Plan of Care Reviewed With: patient    Overall Patient Progress: no changeOverall Patient Progress: no change    Outcome Evaluation: good pain management. minimal N/T episodes. symptomatic soft BPs, 0200 drain held, continues to be 10cc/hr. Dizziness with activity      Status: POD 4 s/p  shunt removal d/t infection; PMH  shunt implantation 2020 and  revision 1/23; LD placed 1/30 draining 10ml/hr, abx course for infection and possible shunt replacement.  Vitals: VSS on RA ex symptomatic soft Bps, MD notified, to not drain LD @ 0200, symptoms improved since.    Neuros: A&Ox4. N/T to R heel and L foot noted @ 2000, denied since. N to R ear and around incision. Mild photophobia improving. Generalized weakness.  IV: L PICC HL in between IV antibiotics.  Resp/trach: on RA. Mild URRUTIA. Hx of asthma.  Diet: regular diet  Bowel status: BS+, no BM this shift. LBM 2/1.  : voiding spontaneously  Skin: R head incision, sutured, ROSIE. LD site covered, CDI.    Pain: Back and headache managed with PRN tylenol x1 and PRN oxycodone x1.  Activity: A1/GB/walker. Postural dizziness.  Plan: Continue to drain 10mL from LD q1hr. Pain management.  To take benadryl 20 minutes prior to Diamox, and be monitored for side effects. 2 weeks of IV antibiotics. Plan for  shunt insertion 2/8 @ 8086 with Dr. Cabrera

## 2024-02-02 NOTE — PROGRESS NOTES
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 Planned replacement 2/8/24. LP drain currently in place.  Neck pain, myalgia, fever, headache and photophobia, improved. CSF rechecked due to new symptoms. 1190 WBC, 93% PMNs on 1/30, improved to 192 WBC, 18% PMNs on 2/1/24.   Erythematous neck/chest rash, resolved  Single blood culture growing from 1/29 growing Streptococcus sanguinis (1 of 2 bottles) c/f transient bacteremia. Penicillin intermediate. Ceftriaxone sensitive. 1/31 blood cultures negative.      Discussion:  Leigh Sloan is a 23 year old woman who was admitted for ventriculoperitoneal shunt replacement due to positive cultures for C acnes. After her shunt removal she developed a CSF leak with lumbar drain placed. She then developed fever, severe headache and photophobia.CSF showed significant pleocytosis with neutrophil predominance, unexpected post-procedurally. No organisms on stain and CSF culture negative. Interestingly, 1 blood culture done during fever workup was positive for S sanguinis.     She is now significantly improved and antibiotics, which were temporarily empirically broadened, have bene narrowed back to ceftriaxone which covers both C acnes and S sanguinis. Her CSF profile is also improved upon recheck. Unclear if the S sanguinis was a transient bacteremia from oral source (dentition is in good repair) or a contributing factor to her symptoms on 1/30. We will plan to cover the S sanguinis in addition to C acnes due to this uncertainty. Since S sanguinis is penicillin intermediate, we will finish course with ceftriaxone.     Recommendations:  - Plan for ceftriaxone 2g IV Q12H x 14 day from 1st  "negative blood culture (through 2/13/24)  - Agree with planned timing of  shunt replacement 2/8, which would be >7 days following  shunt removal.   - Okay to keep PICC in as cultures from 1/31 remain negative.   - See outpatient parenteral antibiotic therapy (OPAT) note below for additional details.     It has been a pleasure to be involved with this patient's care. We will sign off for now, but please feel free to call with additional questions.     My Galicia MD  Infectious Diseases  Pager [phone]     Primary team:  Place order panel  OPAT Pharmacy ([facility]) Review and ID Care Transition   Place imaging order(s) requested above prior to discharge.  Contact ID teams about missed ID clinic appointments and/or ongoing therapy needs.    Prolonged Parenteral/Oral Antibiotic Recommendations and ID Follow up  This template provides final ID recommendations as of this date.     Infectious Diseases Indication: C acnes shunt infection, S sanguinis bacteremia    Antibiotic Information  Name of Antibiotic Dose of Antibiotic1 Anticipated duration Effective start date2 End date   Ceftriaxone 2g IV daily 2 weeks 1/31/24 2/13/24                 1.Dose of antibiotic will need to be renally adjusted if creatinine clearance changes  2.Effective start date is the date of adequate therapy with appropriate spectrum    Method of antibiotic delivery:PICC line.Is the line being used for another indication besides antimicrobials? No At the end of therapy should the line used for antimicrobials be removed or de-accessed? Yes. Selecting \"yes\" will function as written order to remove PICC line or de-access the indwelling line at the end of therapy.    Weekly labs required: None. Patient will be on <1 week antibiotics at the time of discharge (anticipated ~2/9).     Are there pending microbial tests: No     Imaging for ID follow up: None    No need for ID follow-up unless issues arise. Please contact Dr. Galicia for any ID-related " questions.     ID provider route note: OPAT RN Care Coordinator [facility] ID Clinic Information:  Phone: [phone]  Fax: [phone] (Attention Piter Patel)          Interval History:   Depressed about having to be at hospital through 2/8 for procedure. Otherwise doing pretty well today. Aunt at bedside.          Current Antimicrobials   Ceftriaxone 2g IV Q12H 2/1-present  Vancomycin and cefepime 2g q8h: 1/30 - 2/1     Past Antimicrobials:   Ancef x 3: 12/30            Physical Exam:   Ranges for vital signs:  Temp:  [98  F (36.7  C)-98.8  F (37.1  C)] 98.2  F (36.8  C)  Pulse:  [79-90] 85  Resp:  [16-18] 18  BP: ()/(45-66) 110/61  SpO2:  [98 %-100 %] 100 %  Exam:  GENERAL:  Well-developed, well-nourished, sitting in bed in no acute distress.   ENT:  Head is normocephalic, atraumatic. Anterior oropharynx without ulcers.  EYES:  Eyes have anicteric sclerae.    NECK:  Supple.  LUNGS:  Normal respiratory effort.   ABDOMEN:  Non-distended.   EXT: Extremities without visible edema.   SKIN:  No acute rashes.  Line is in place without any surrounding erythema.  NEUROLOGIC:  Grossly nonfocal.   Lumbar drain in place with clear CSF in bag         Laboratory Data:       Inflammatory Markers    Recent Labs   Lab Test 01/29/24  2207 01/29/24  1218   SED 26* 25*   CRPI  --  15.00*       Hematology Studies    Recent Labs   Lab Test 02/02/24  0657 02/01/24  0743 01/31/24  0654 01/30/24  0655 01/29/24  1218 01/23/24  0638   WBC 7.7 7.4 7.7 10.1 8.3 9.1   HGB 10.6* 10.5* 10.0* 11.0* 12.7 12.1   MCV 84 84 83 81 82 84    291 281 353 407 316       Metabolic Studies     Recent Labs   Lab Test 02/02/24  0657 02/01/24  0743 01/31/24  0654 01/30/24  0655 01/29/24  1218    139 136 135 138   POTASSIUM 3.8 3.9 3.8 3.6 4.4   CHLORIDE 110* 111* 107 102 103   CO2 17* 17* 18* 20* 22   BUN 10.6 10.8 10.1 7.9 12.5   CR 0.74 0.76 0.84 0.67 0.80   GFRESTIMATED >90 >90 >90 >90 >90        Hepatic Studies  No lab results found.    Microbiology:  Culture   Date Value Ref Range Status   01/31/2024 No growth after 1 day  Preliminary   01/31/2024 No growth after 1 day  Preliminary   01/30/2024 No anaerobic organisms isolated after 2 days  Preliminary   01/30/2024 No growth after 2 days  Preliminary   01/30/2024 No growth after 2 days  Preliminary   01/29/2024 Positive on the 2nd day of incubation (A)  Final   01/29/2024 Streptococcus sanguinis (AA)  Final     Comment:     1 of 2 bottles   01/29/2024 No anaerobic organisms isolated after 3 days  Preliminary   01/29/2024 No growth after 3 days  Preliminary   01/29/2024 No anaerobic organisms isolated after 3 days  Preliminary   01/29/2024 No growth after 3 days  Preliminary   01/29/2024 No anaerobic organisms isolated after 3 days  Preliminary   01/29/2024 No growth after 3 days  Preliminary   01/29/2024 No growth after 3 days  Preliminary   01/29/2024 No growth after 3 days  Preliminary   01/22/2024 No anaerobic organisms isolated  Final   01/22/2024 No growth after 10 days  Preliminary   01/22/2024 (A)  Corrected    Isolated in broth only Cutibacterium (Propionibacterium) acnes     Comment:     On day 5 of incubation  Susceptibilities done on previous cultures   01/22/2024 No anaerobic organisms isolated after 11 days  Preliminary   01/22/2024 No growth after 11 days  Preliminary   01/22/2024 No Growth  Final   01/22/2024 (A)  Final    Isolated in broth only Cutibacterium (Propionibacterium) acnes     Comment:     On day 5 of incubation   01/22/2024 No growth after 10 days  Preliminary   01/22/2024 Isolated in broth only Anaerobic diphtheroids (A)  Corrected     Comment:     On day 5 of incubation  See anaerobic report for identification   01/12/2024 50,000-100,000 CFU/mL Mixture of Urogenital Brie  Final       Urine Studies    Recent Labs   Lab Test 01/12/24  1339   LEUKEST Negative   WBCU <1            Imaging:     No new imaging

## 2024-02-03 LAB
ANION GAP SERPL CALCULATED.3IONS-SCNC: 12 MMOL/L (ref 7–15)
BACTERIA BLD CULT: NO GROWTH
BACTERIA BLD CULT: NO GROWTH
BUN SERPL-MCNC: 14.7 MG/DL (ref 6–20)
CALCIUM SERPL-MCNC: 9.2 MG/DL (ref 8.6–10)
CHLORIDE SERPL-SCNC: 108 MMOL/L (ref 98–107)
CREAT SERPL-MCNC: 0.72 MG/DL (ref 0.51–0.95)
DEPRECATED HCO3 PLAS-SCNC: 16 MMOL/L (ref 22–29)
EGFRCR SERPLBLD CKD-EPI 2021: >90 ML/MIN/1.73M2
ERYTHROCYTE [DISTWIDTH] IN BLOOD BY AUTOMATED COUNT: 14.3 % (ref 10–15)
GLUCOSE SERPL-MCNC: 92 MG/DL (ref 70–99)
HCT VFR BLD AUTO: 32 % (ref 35–47)
HGB BLD-MCNC: 10.5 G/DL (ref 11.7–15.7)
MAGNESIUM SERPL-MCNC: 2 MG/DL (ref 1.7–2.3)
MCH RBC QN AUTO: 27.1 PG (ref 26.5–33)
MCHC RBC AUTO-ENTMCNC: 32.8 G/DL (ref 31.5–36.5)
MCV RBC AUTO: 83 FL (ref 78–100)
PHOSPHATE SERPL-MCNC: 4.3 MG/DL (ref 2.5–4.5)
PLATELET # BLD AUTO: 301 10E3/UL (ref 150–450)
POTASSIUM SERPL-SCNC: 3.7 MMOL/L (ref 3.4–5.3)
RBC # BLD AUTO: 3.87 10E6/UL (ref 3.8–5.2)
SODIUM SERPL-SCNC: 136 MMOL/L (ref 135–145)
WBC # BLD AUTO: 9.1 10E3/UL (ref 4–11)

## 2024-02-03 PROCEDURE — 80048 BASIC METABOLIC PNL TOTAL CA: CPT

## 2024-02-03 PROCEDURE — 250N000013 HC RX MED GY IP 250 OP 250 PS 637

## 2024-02-03 PROCEDURE — 120N000002 HC R&B MED SURG/OB UMMC

## 2024-02-03 PROCEDURE — 250N000011 HC RX IP 250 OP 636

## 2024-02-03 PROCEDURE — 83735 ASSAY OF MAGNESIUM: CPT

## 2024-02-03 PROCEDURE — 250N000013 HC RX MED GY IP 250 OP 250 PS 637: Performed by: NURSE PRACTITIONER

## 2024-02-03 PROCEDURE — 84100 ASSAY OF PHOSPHORUS: CPT

## 2024-02-03 PROCEDURE — 36592 COLLECT BLOOD FROM PICC: CPT

## 2024-02-03 PROCEDURE — 85027 COMPLETE CBC AUTOMATED: CPT

## 2024-02-03 PROCEDURE — 250N000013 HC RX MED GY IP 250 OP 250 PS 637: Performed by: NEUROLOGICAL SURGERY

## 2024-02-03 RX ORDER — POTASSIUM CHLORIDE 750 MG/1
20 TABLET, EXTENDED RELEASE ORAL ONCE
Status: COMPLETED | OUTPATIENT
Start: 2024-02-03 | End: 2024-02-03

## 2024-02-03 RX ORDER — MAGNESIUM OXIDE 400 MG/1
400 TABLET ORAL EVERY 4 HOURS
Status: COMPLETED | OUTPATIENT
Start: 2024-02-03 | End: 2024-02-03

## 2024-02-03 RX ADMIN — MAGNESIUM OXIDE TAB 400 MG (241.3 MG ELEMENTAL MG) 400 MG: 400 (241.3 MG) TAB at 09:36

## 2024-02-03 RX ADMIN — BUDESONIDE AND FORMOTEROL FUMARATE DIHYDRATE 2 PUFF: 80; 4.5 AEROSOL RESPIRATORY (INHALATION) at 21:01

## 2024-02-03 RX ADMIN — HEPARIN SODIUM 5000 UNITS: 5000 INJECTION, SOLUTION INTRAVENOUS; SUBCUTANEOUS at 09:37

## 2024-02-03 RX ADMIN — Medication 5 ML: at 04:42

## 2024-02-03 RX ADMIN — PRAZOSIN HYDROCHLORIDE 2 MG: 2 CAPSULE ORAL at 21:02

## 2024-02-03 RX ADMIN — ACETAMINOPHEN 650 MG: 325 TABLET, FILM COATED ORAL at 21:07

## 2024-02-03 RX ADMIN — CEFTRIAXONE SODIUM 2 G: 2 INJECTION, POWDER, FOR SOLUTION INTRAMUSCULAR; INTRAVENOUS at 03:05

## 2024-02-03 RX ADMIN — Medication 5 ML: at 16:02

## 2024-02-03 RX ADMIN — SENNOSIDES AND DOCUSATE SODIUM 1 TABLET: 8.6; 5 TABLET ORAL at 21:02

## 2024-02-03 RX ADMIN — SENNOSIDES AND DOCUSATE SODIUM 1 TABLET: 8.6; 5 TABLET ORAL at 09:35

## 2024-02-03 RX ADMIN — PANTOPRAZOLE SODIUM 40 MG: 40 TABLET, DELAYED RELEASE ORAL at 09:35

## 2024-02-03 RX ADMIN — ACETAMINOPHEN 650 MG: 325 TABLET, FILM COATED ORAL at 09:35

## 2024-02-03 RX ADMIN — HEPARIN SODIUM 5000 UNITS: 5000 INJECTION, SOLUTION INTRAVENOUS; SUBCUTANEOUS at 02:01

## 2024-02-03 RX ADMIN — METOPROLOL TARTRATE 25 MG: 25 TABLET, FILM COATED ORAL at 21:03

## 2024-02-03 RX ADMIN — METOPROLOL TARTRATE 25 MG: 25 TABLET, FILM COATED ORAL at 09:37

## 2024-02-03 RX ADMIN — Medication 150 MG: at 21:03

## 2024-02-03 RX ADMIN — SERTRALINE HYDROCHLORIDE 75 MG: 50 TABLET ORAL at 21:02

## 2024-02-03 RX ADMIN — CYCLOBENZAPRINE 10 MG: 10 TABLET, FILM COATED ORAL at 12:48

## 2024-02-03 RX ADMIN — CEFTRIAXONE SODIUM 2 G: 2 INJECTION, POWDER, FOR SOLUTION INTRAMUSCULAR; INTRAVENOUS at 15:06

## 2024-02-03 RX ADMIN — OXYCODONE HYDROCHLORIDE 5 MG: 5 TABLET ORAL at 09:35

## 2024-02-03 RX ADMIN — DROSPIRENONE AND ETHINYL ESTRADIOL 1 TABLET: KIT at 21:03

## 2024-02-03 RX ADMIN — MAGNESIUM OXIDE TAB 400 MG (241.3 MG ELEMENTAL MG) 400 MG: 400 (241.3 MG) TAB at 12:48

## 2024-02-03 RX ADMIN — POTASSIUM CHLORIDE 20 MEQ: 750 TABLET, EXTENDED RELEASE ORAL at 09:36

## 2024-02-03 RX ADMIN — ACETAMINOPHEN 650 MG: 325 TABLET, FILM COATED ORAL at 15:14

## 2024-02-03 RX ADMIN — POLYETHYLENE GLYCOL 3350 17 G: 17 POWDER, FOR SOLUTION ORAL at 09:37

## 2024-02-03 RX ADMIN — OXYCODONE HYDROCHLORIDE 5 MG: 5 TABLET ORAL at 15:14

## 2024-02-03 ASSESSMENT — ACTIVITIES OF DAILY LIVING (ADL)
ADLS_ACUITY_SCORE: 22

## 2024-02-03 ASSESSMENT — VISUAL ACUITY
OU: BASELINE;GLASSES
OU: BASELINE;GLASSES

## 2024-02-03 NOTE — PLAN OF CARE
Goal Outcome Evaluation:      Plan of Care Reviewed With: patient    Overall Patient Progress: improvingOverall Patient Progress: improving    Outcome Evaluation: no pain meds needed overnight, VSS on RA, drain 10cc/hr. postural dizziness      Status: POD 5 s/p  shunt removal d/t infection; LD placed 1/30 draining 10ml/hr.  Vitals: VSS on RA ex intermittently tachycardic. On continuous pulse ox.  Neuros: A&Ox4. N/T to to R foot, R ear around the incision site, and mouth/upper lip. Generalized weakness.  IV: L PICC HL in between IV antibiotics.  Labs/Electrolytes: Redraws this AM.  Resp/trach: on RA. URRUTIA. Hx of asthma.  Diet: regular diet. Strict I&Os.  Bowel status: BS+, no BM this shift. LBM 2/2.  : voiding spontaneously. On menses.  Skin: R head incision, sutured, ROSIE. LD site covered, CDI.    Pain: Mild Back and headache pain, declined any interventions.  Activity: A1/GB/walker. Postural dizziness.  Plan: Continue to drain 10mL from LD q1hr. Pain management.  To take benadryl 20 minutes prior to Diamox, and be monitored for side effects. 2 weeks of IV antibiotics. Plan for  shunt insertion 2/8 @ 8088 with Dr. Cabrera  Education completed: Needing PICC education with aunt.

## 2024-02-03 NOTE — CARE PLAN
Status: POD 4 s/p removal of  shunt and POD 3 lumbar drain placement d/t  shunt colonized by P Acnes. Pt known for idiopathic intracranial HTN, Asthma, depression, GERD, PCOS.   Vitals: VSS on RA  Neuros: Aox4. PERRLA except slight photophobia, not new. 5/5 throughout. Numb to L foot/toes, cupids bow, and R old  incision, not new.   IV: PICC DL, blood return and hep locked. Used for antibx. CHG wipes completed on shift.   Labs/Electrolytes: Mag and K replaced this am.   Resp/trach: On RA satting well. LSC. Pt does have a nonproductive cough. Pulse ox on overnight for sleep apnea.   Diet: Reg. Eating well. Strict Is and Os.    Bowel status: LBM 02/02 2x on shift. Passing gas+. BS+  : Voids spontaneously.   Skin: LD incision CDI. Old  shunt with sutures, approximated. Bruising to LUE.  Pain: 5/10 pain to head, back, and L foot controlled with PO PRN Tylenol x1 and PO PRN Oxy 1.   Activity: Ax1 GB and pole. Pt ambulated with student around halls x1.   Social: Aunt at bedside on shift.   Plan: Reimplantation of  shunt on 2/8 07h30. Keep draining LD 10ml/hr.   Education completed: Needing PICC education with aunt.

## 2024-02-03 NOTE — PLAN OF CARE
Goal Outcome Evaluation:      Plan of Care Reviewed With: patient    Overall Patient Progress: improvingOverall Patient Progress: improving    Vitals: VSS on RA-hypotensive this am (MD notified and added hold paraemeters for BP meds). Continuous pulse ox in place.   Neuros: Alert and oriented x4. 5/5 throughout. Numbness to right ear/head by incision and to upper lip.   IV: PICC Hl'd.  Labs/Electrolytes: K and Mg replaced per protocol, recheck in am.   Resp/trach: WNL on RA  Diet: Regular diet, declined breakfast. Strict I/O. Aunt brought lunch.   Bowel status: BS+x4, last BM 2/2  : Voiding.  Skin: LD dressing CDI, scabs to chest, bruising to left arm and R crani site ROSIE.  Pain: Tylenol, oxycodone and flexeril utilized for pain to bilateral hips, head and neck.   Activity: Up with assist of 1, GB and walker. Ambulated in hallway x1.  Social: Aunt visited this shift.   Plan: Continue to drain 10ml from lumbar drain per hour. IV abx. Plan for  shunt insertion 2/8 @ 4082 with Dr. Cabrera.

## 2024-02-03 NOTE — PROGRESS NOTES
"[facility], [place]  Neurosurgery Progress Note:  02/03/2024      Interval History: NAEO. Tolerating lumbar drainage. Stopped diamox due to low blood pressure but has been tolerating fine without any allergy symptoms.    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 She is now POD-5 s/p removal of  shunt, PPD-4 s/p lumbar drain placement.    Clinically Significant Risk Factors                         # Severe Obesity: Estimated body mass index is 42.26 kg/m  as calculated from the following:    Height as of this encounter: 1.651 m (5' 5\").    Weight as of this encounter: 115.2 kg (253 lb 15.5 oz).      # Asthma: noted on problem list        Pre-operative anticoagulation/antiplatelet: none    Plan:  Serial neuro exams  Continue lumbar drainage 10/hr  ID consult, appreciate assistance  Ceftriaxone - 2 week course  PICC placed  Follow repeat blood cultures  Shunt replacement planned for 2/8 with general surgery  Diamox discontinued for low blood pressure, NO ALLERGY  HOB > 45 degrees  Pain control  Advance diet as tolerated  Bowel regimen  PRN antiemetics  IVF until taking adequate PO  PT/OT  SCDs for DVT proph, SQH    -----------------------------------  Nicole Rinaldi MD, PhD  PGY-2 Neurosurgery    Please contact neurosurgery resident on call with questions.    Dial * * [phone], enter [phone] when prompted.   -----------------------------------    Gen: Appears mildly uncomfortable  Wound: incision clean dry intact, appropriately healing  Neurologic:  Alert & Oriented to person, place, time, and situation  Follows commands briskly  Speech fluent, spontaneous. No aphasia or dysarthria.  No gaze preference. No apparent hemineglect.  PERRL, EOMI but painful  Face symmetric with sensation intact to light touch  Palate elevates symmetrically, uvula midline, tongue protrudes midline  Trapezii muscles 5/5 " bilaterally  No pronator drift     Del Tr Bi WE WF Gr   R 5 5 5 5 5 5   L 5 5 5 5 5 5    HF KE KF DF PF EHL   R 5 5 5 5 5 5   L 5 5 5 5 5 5     Reflexes 2+ throughout    Sensation intact and symmetric to light touch throughout    Objective:   Temp:  [97.8  F (36.6  C)-99  F (37.2  C)] 98.1  F (36.7  C)  Pulse:  [87-97] 97  Resp:  [16-18] 16  BP: ()/(55-79) 110/70  SpO2:  [98 %-100 %] 99 %  I/O last 3 completed shifts:  In: 1000 [P.O.:900; I.V.:100]  Out: 1020 [Urine:800; Drains:220]        LABS:  Recent Labs   Lab 02/03/24  0727 02/02/24  0657 02/01/24  0743    139 139   POTASSIUM 3.7 3.8 3.9   CHLORIDE 108* 110* 111*   CO2 16* 17* 17*   ANIONGAP 12 12 11   GLC 92 88 98   BUN 14.7 10.6 10.8   CR 0.72 0.74 0.76   KEV 9.2 9.0 8.9       Recent Labs   Lab 02/03/24  0727   WBC 9.1   RBC 3.87   HGB 10.5*   HCT 32.0*   MCV 83   MCH 27.1   MCHC 32.8   RDW 14.3          IMAGING:  Recent Results (from the past 24 hour(s))   CT Head w/o Contrast    Narrative    EXAM: CT HEAD W/O CONTRAST  2/2/2024 2:13 PM     HISTORY: s/p lumbar drain       COMPARISON: CT head 1/29/2024    TECHNIQUE: Using multidetector thin collimation helical acquisition  technique, axial, coronal and sagittal CT images from the skull base  to the vertex were obtained without intravenous contrast.   (topogram) image(s) also obtained and reviewed.    FINDINGS:  Interval resolution of the intraventricular pneumocephalus. No acute  intracranial hemorrhage, mass effect, or midline shift. No acute loss  of gray-white matter differentiation in the cerebral hemispheres.  Ventricles are proportionate to the cerebral sulci. Clear basal  cisterns.     Right parieto-occipital [place] hole. Small amount of overlying  subcutaneous emphysema and edema. The bony calvaria and the bones of  the skull base are normal. The visualized portions of the paranasal  sinuses and mastoid air cells are clear. Grossly normal orbits.       Impression    IMPRESSION:    1. No acute intracranial pathology.   2. Resolved intraventricular pneumocephalus.    I have personally reviewed the examination and initial interpretation  and I agree with the findings.    DUYEN BLANCHARD MD         SYSTEM ID:  [id number]

## 2024-02-04 LAB
ANION GAP SERPL CALCULATED.3IONS-SCNC: 12 MMOL/L (ref 7–15)
BACTERIA CSF CULT: NO GROWTH
BACTERIA SPEC CULT: NO GROWTH
BUN SERPL-MCNC: 16.9 MG/DL (ref 6–20)
CALCIUM SERPL-MCNC: 9.4 MG/DL (ref 8.6–10)
CHLORIDE SERPL-SCNC: 108 MMOL/L (ref 98–107)
CREAT SERPL-MCNC: 0.87 MG/DL (ref 0.51–0.95)
DEPRECATED HCO3 PLAS-SCNC: 18 MMOL/L (ref 22–29)
EGFRCR SERPLBLD CKD-EPI 2021: >90 ML/MIN/1.73M2
ERYTHROCYTE [DISTWIDTH] IN BLOOD BY AUTOMATED COUNT: 14.2 % (ref 10–15)
GLUCOSE SERPL-MCNC: 110 MG/DL (ref 70–99)
GRAM STAIN RESULT: NORMAL
GRAM STAIN RESULT: NORMAL
HCT VFR BLD AUTO: 31.9 % (ref 35–47)
HGB BLD-MCNC: 10.6 G/DL (ref 11.7–15.7)
MAGNESIUM SERPL-MCNC: 2 MG/DL (ref 1.7–2.3)
MCH RBC QN AUTO: 27.3 PG (ref 26.5–33)
MCHC RBC AUTO-ENTMCNC: 33.2 G/DL (ref 31.5–36.5)
MCV RBC AUTO: 82 FL (ref 78–100)
PHOSPHATE SERPL-MCNC: 3.4 MG/DL (ref 2.5–4.5)
PLATELET # BLD AUTO: 307 10E3/UL (ref 150–450)
POTASSIUM SERPL-SCNC: 3.6 MMOL/L (ref 3.4–5.3)
RBC # BLD AUTO: 3.88 10E6/UL (ref 3.8–5.2)
SODIUM SERPL-SCNC: 138 MMOL/L (ref 135–145)
WBC # BLD AUTO: 8.4 10E3/UL (ref 4–11)

## 2024-02-04 PROCEDURE — 120N000002 HC R&B MED SURG/OB UMMC

## 2024-02-04 PROCEDURE — 250N000013 HC RX MED GY IP 250 OP 250 PS 637

## 2024-02-04 PROCEDURE — 36592 COLLECT BLOOD FROM PICC: CPT

## 2024-02-04 PROCEDURE — 85014 HEMATOCRIT: CPT

## 2024-02-04 PROCEDURE — 250N000013 HC RX MED GY IP 250 OP 250 PS 637: Performed by: NURSE PRACTITIONER

## 2024-02-04 PROCEDURE — 83735 ASSAY OF MAGNESIUM: CPT

## 2024-02-04 PROCEDURE — 250N000011 HC RX IP 250 OP 636

## 2024-02-04 PROCEDURE — 250N000013 HC RX MED GY IP 250 OP 250 PS 637: Performed by: NEUROLOGICAL SURGERY

## 2024-02-04 PROCEDURE — 84100 ASSAY OF PHOSPHORUS: CPT

## 2024-02-04 PROCEDURE — 80048 BASIC METABOLIC PNL TOTAL CA: CPT

## 2024-02-04 RX ORDER — POTASSIUM CHLORIDE 750 MG/1
20 TABLET, EXTENDED RELEASE ORAL ONCE
Status: COMPLETED | OUTPATIENT
Start: 2024-02-04 | End: 2024-02-04

## 2024-02-04 RX ORDER — MAGNESIUM OXIDE 400 MG/1
400 TABLET ORAL EVERY 4 HOURS
Status: COMPLETED | OUTPATIENT
Start: 2024-02-04 | End: 2024-02-04

## 2024-02-04 RX ADMIN — POLYETHYLENE GLYCOL 3350 17 G: 17 POWDER, FOR SOLUTION ORAL at 08:51

## 2024-02-04 RX ADMIN — MAGNESIUM OXIDE TAB 400 MG (241.3 MG ELEMENTAL MG) 400 MG: 400 (241.3 MG) TAB at 11:56

## 2024-02-04 RX ADMIN — OXYCODONE HYDROCHLORIDE 5 MG: 5 TABLET ORAL at 08:51

## 2024-02-04 RX ADMIN — SERTRALINE HYDROCHLORIDE 75 MG: 50 TABLET ORAL at 22:01

## 2024-02-04 RX ADMIN — OXYCODONE HYDROCHLORIDE 5 MG: 5 TABLET ORAL at 14:46

## 2024-02-04 RX ADMIN — POTASSIUM CHLORIDE 20 MEQ: 750 TABLET, EXTENDED RELEASE ORAL at 08:50

## 2024-02-04 RX ADMIN — HEPARIN SODIUM 5000 UNITS: 5000 INJECTION, SOLUTION INTRAVENOUS; SUBCUTANEOUS at 09:00

## 2024-02-04 RX ADMIN — HEPARIN SODIUM 5000 UNITS: 5000 INJECTION, SOLUTION INTRAVENOUS; SUBCUTANEOUS at 18:02

## 2024-02-04 RX ADMIN — ACETAMINOPHEN 650 MG: 325 TABLET, FILM COATED ORAL at 21:05

## 2024-02-04 RX ADMIN — CEFTRIAXONE SODIUM 2 G: 2 INJECTION, POWDER, FOR SOLUTION INTRAMUSCULAR; INTRAVENOUS at 14:47

## 2024-02-04 RX ADMIN — CYCLOBENZAPRINE 10 MG: 10 TABLET, FILM COATED ORAL at 11:15

## 2024-02-04 RX ADMIN — Medication 150 MG: at 22:03

## 2024-02-04 RX ADMIN — ACETAMINOPHEN 650 MG: 325 TABLET, FILM COATED ORAL at 13:08

## 2024-02-04 RX ADMIN — BUDESONIDE AND FORMOTEROL FUMARATE DIHYDRATE 2 PUFF: 80; 4.5 AEROSOL RESPIRATORY (INHALATION) at 19:58

## 2024-02-04 RX ADMIN — Medication 5 ML: at 15:55

## 2024-02-04 RX ADMIN — ACETAMINOPHEN 650 MG: 325 TABLET, FILM COATED ORAL at 16:59

## 2024-02-04 RX ADMIN — Medication 5 ML: at 06:33

## 2024-02-04 RX ADMIN — Medication 10 ML: at 05:06

## 2024-02-04 RX ADMIN — HEPARIN SODIUM 5000 UNITS: 5000 INJECTION, SOLUTION INTRAVENOUS; SUBCUTANEOUS at 02:04

## 2024-02-04 RX ADMIN — CEFTRIAXONE SODIUM 2 G: 2 INJECTION, POWDER, FOR SOLUTION INTRAMUSCULAR; INTRAVENOUS at 03:05

## 2024-02-04 RX ADMIN — MAGNESIUM OXIDE TAB 400 MG (241.3 MG ELEMENTAL MG) 400 MG: 400 (241.3 MG) TAB at 08:49

## 2024-02-04 RX ADMIN — PRAZOSIN HYDROCHLORIDE 2 MG: 2 CAPSULE ORAL at 22:01

## 2024-02-04 RX ADMIN — ACETAMINOPHEN 650 MG: 325 TABLET, FILM COATED ORAL at 08:49

## 2024-02-04 RX ADMIN — OXYCODONE HYDROCHLORIDE 5 MG: 5 TABLET ORAL at 21:06

## 2024-02-04 RX ADMIN — SENNOSIDES AND DOCUSATE SODIUM 1 TABLET: 8.6; 5 TABLET ORAL at 08:50

## 2024-02-04 RX ADMIN — PANTOPRAZOLE SODIUM 40 MG: 40 TABLET, DELAYED RELEASE ORAL at 08:49

## 2024-02-04 RX ADMIN — METOPROLOL TARTRATE 25 MG: 25 TABLET, FILM COATED ORAL at 19:58

## 2024-02-04 RX ADMIN — METOPROLOL TARTRATE 25 MG: 25 TABLET, FILM COATED ORAL at 08:50

## 2024-02-04 ASSESSMENT — ACTIVITIES OF DAILY LIVING (ADL)
ADLS_ACUITY_SCORE: 22
ADLS_ACUITY_SCORE: 23
ADLS_ACUITY_SCORE: 22
ADLS_ACUITY_SCORE: 23
ADLS_ACUITY_SCORE: 23
ADLS_ACUITY_SCORE: 22
ADLS_ACUITY_SCORE: 23

## 2024-02-04 ASSESSMENT — VISUAL ACUITY
OU: BASELINE;GLASSES

## 2024-02-04 NOTE — PROGRESS NOTES
"[facility], [place]  Neurosurgery Progress Note:  02/04/2024      Interval History: NAEO. Tolerating lumbar drainage without signs of leak.     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 She is now POD-6 s/p removal of  shunt, PPD-5 s/p lumbar drain placement.    Clinically Significant Risk Factors                         # Severe Obesity: Estimated body mass index is 42.26 kg/m  as calculated from the following:    Height as of this encounter: 1.651 m (5' 5\").    Weight as of this encounter: 115.2 kg (253 lb 15.5 oz).      # Asthma: noted on problem list        Pre-operative anticoagulation/antiplatelet: none    Plan:  Serial neuro exams  Continue lumbar drainage 10/hr  ID consult, appreciate assistance  Ceftriaxone - 2 week course  PICC placed  Follow repeat blood cultures  Shunt replacement planned for 2/8 with general surgery  Diamox discontinued for low blood pressure, NO ALLERGY TO DIAMOX  HOB > 45 degrees  Pain control  Advance diet as tolerated  Bowel regimen  PRN antiemetics  IVF until taking adequate PO  PT/OT  SCDs for DVT proph, SQH    -----------------------------------  WILLIAM RUSSO MD on 2/4/2024  PGY1 Neurosurgery Intern    Please contact neurosurgery resident on call with questions.    Dial * * [phone], enter [phone] when prompted.   -----------------------------------    Gen: Sleeping comfortably in bed  Wound: incision clean dry intact, appropriately healing; no sign of leak at lumbar drain site  Neurologic:  Alert & Oriented to person, place, time, and situation  Follows commands briskly  Speech fluent, spontaneous. No aphasia or dysarthria.  No gaze preference. No apparent hemineglect.  PERRL, EOMI but painful  Face symmetric with sensation intact to light touch  Palate elevates symmetrically, uvula midline, tongue protrudes midline  Trapezii muscles 5/5 bilaterally  No " pronator drift     Del Tr Bi WE WF Gr   R 5 5 5 5 5 5   L 5 5 5 5 5 5    HF KE KF DF PF EHL   R 5 5 5 5 5 5   L 5 5 5 5 5 5     Reflexes 2+ throughout    Sensation intact and symmetric to light touch throughout    Objective:   Temp:  [97.6  F (36.4  C)-98.7  F (37.1  C)] 97.6  F (36.4  C)  Pulse:  [73-94] 85  Resp:  [14-18] 16  BP: (101-114)/(46-65) 105/65  SpO2:  [98 %-99 %] 99 %  I/O last 3 completed shifts:  In: 1040 [P.O.:1020; I.V.:20]  Out: 1170 [Urine:950; Drains:220]        LABS:  Recent Labs   Lab 02/04/24  0639 02/03/24  0727 02/02/24  0657    136 139   POTASSIUM 3.6 3.7 3.8   CHLORIDE 108* 108* 110*   CO2 18* 16* 17*   ANIONGAP 12 12 12   * 92 88   BUN 16.9 14.7 10.6   CR 0.87 0.72 0.74   KEV 9.4 9.2 9.0       Recent Labs   Lab 02/04/24  0639   WBC 8.4   RBC 3.88   HGB 10.6*   HCT 31.9*   MCV 82   MCH 27.3   MCHC 33.2   RDW 14.2          IMAGING:  No results found for this or any previous visit (from the past 24 hour(s)).

## 2024-02-04 NOTE — PLAN OF CARE
Goal Outcome Evaluation:      Plan of Care Reviewed With: patient    Overall Patient Progress: improvingOverall Patient Progress: improving    Outcome Evaluation: no pain meds needed overnight. drain 10cc/hr, tolerating.    Status: POD 6 s/p  shunt removal d/t infection; LD placed 1/30 draining 10ml/hr.  Vitals: VSS on RA ex intermittently tachycardic. On continuous pulse ox.  Neuros: A&Ox4. N/T to R ear around the incision site. Generalized weakness.  IV: L PICC HL in between IV antibiotics, caps changed this shift.  Labs/Electrolytes: Redraws this AM.  Resp/trach: on RA. URRUTIA. Hx of asthma.  Diet: regular diet. Strict I&Os.  Bowel status: BS+, no BM this shift. LBM 2/3.  : voiding spontaneously. On menses.  Skin: R head incision, sutured, ROSIE. LD site covered, CDI (air bubble under dressing). Scabs to chest.   Pain: Mild to moderate posterior headache pain, declined any interventions.  Activity: A1/GB/walker. Postural dizziness. HOB>30.  Plan: Continue to drain 10mL from LD q1hr. Pain management. 2 weeks of IV antibiotics. Plan for  shunt insertion 2/8 @ 6860 with Dr. Cabrera  Education completed: Needing PICC education with aunt.

## 2024-02-04 NOTE — PLAN OF CARE
Status: POD 5 removal of  shunt d/t infection and POD 4 LD placement   Vitals: VSS on RA. Continuous pulse ox   Neuros: A&O x4. Tingling to upper/lower lips and R head incision, intermittent n/t to feet. Strengths 5/5 t/o.   IV: DL PICC HL, blood return+     Labs/Electrolytes: Rechecks in AM    Resp/trach: LSC    Diet: Regular diet, strict I&Os.    Bowel status: BM x2. BS+  : Voiding spontaneously   Skin: LD incision CDI, R head incision ROSIE. Bruising to LUE.  Pain: PRN tylenol and oxycodone given for HA    Activity: A1 GB/walker    Social: Aunt visited    Plan: OR for  shunt reimplantation 2/8 0730. LD draining 10ml/hr.   Education completed: PICC education with aunt needed before discharge

## 2024-02-04 NOTE — PLAN OF CARE
Vitals: VSS on RA, continuous pulse ox in place.   Neuros: Alert and oriented x4. 5/5 throughout. Numbness to right ear/head by incision.  IV: PICC Hl'd.  Labs/Electrolytes: K and Mg replaced per protocol, recheck in am.   Resp/trach: WNL on RA  Diet: Regular diet, strict I/O.   Bowel status: BS+x4, last BM 2/4  : Voiding.  Skin: LD dressing CDI, scabs to chest, bruising to left arm and R crani site ROSIE.  Pain: Tylenol, oxycodone and flexeril utilized for pain to head and back.   Activity: Up with assist of 1, GB and walker. Ambulated in hallway x1. Up in chair.   Social: No visitors this shift.   Plan: Continue to drain 10ml from lumbar drain per hour. IV abx. Plan for  shunt insertion 2/8 @ 1734 with Dr. Cabrera.

## 2024-02-05 LAB
ABO/RH(D): NORMAL
ANION GAP SERPL CALCULATED.3IONS-SCNC: 11 MMOL/L (ref 7–15)
ANTIBODY SCREEN: NEGATIVE
BACTERIA BLD CULT: NO GROWTH
BACTERIA BLD CULT: NO GROWTH
BACTERIA CSF CULT: NORMAL
BUN SERPL-MCNC: 15.4 MG/DL (ref 6–20)
CALCIUM SERPL-MCNC: 8.9 MG/DL (ref 8.6–10)
CHLORIDE SERPL-SCNC: 107 MMOL/L (ref 98–107)
CREAT SERPL-MCNC: 0.63 MG/DL (ref 0.51–0.95)
DEPRECATED HCO3 PLAS-SCNC: 19 MMOL/L (ref 22–29)
EGFRCR SERPLBLD CKD-EPI 2021: >90 ML/MIN/1.73M2
ERYTHROCYTE [DISTWIDTH] IN BLOOD BY AUTOMATED COUNT: 14 % (ref 10–15)
GLUCOSE SERPL-MCNC: 90 MG/DL (ref 70–99)
HCT VFR BLD AUTO: 32.2 % (ref 35–47)
HGB BLD-MCNC: 10.6 G/DL (ref 11.7–15.7)
MAGNESIUM SERPL-MCNC: 1.8 MG/DL (ref 1.7–2.3)
MCH RBC QN AUTO: 27.2 PG (ref 26.5–33)
MCHC RBC AUTO-ENTMCNC: 32.9 G/DL (ref 31.5–36.5)
MCV RBC AUTO: 83 FL (ref 78–100)
PHOSPHATE SERPL-MCNC: 3.7 MG/DL (ref 2.5–4.5)
PLATELET # BLD AUTO: 292 10E3/UL (ref 150–450)
POTASSIUM SERPL-SCNC: 3.8 MMOL/L (ref 3.4–5.3)
RBC # BLD AUTO: 3.89 10E6/UL (ref 3.8–5.2)
SODIUM SERPL-SCNC: 137 MMOL/L (ref 135–145)
SPECIMEN EXPIRATION DATE: NORMAL
WBC # BLD AUTO: 7.6 10E3/UL (ref 4–11)

## 2024-02-05 PROCEDURE — 250N000013 HC RX MED GY IP 250 OP 250 PS 637: Performed by: NEUROLOGICAL SURGERY

## 2024-02-05 PROCEDURE — 250N000013 HC RX MED GY IP 250 OP 250 PS 637

## 2024-02-05 PROCEDURE — 85027 COMPLETE CBC AUTOMATED: CPT

## 2024-02-05 PROCEDURE — 250N000011 HC RX IP 250 OP 636

## 2024-02-05 PROCEDURE — 80048 BASIC METABOLIC PNL TOTAL CA: CPT

## 2024-02-05 PROCEDURE — 120N000002 HC R&B MED SURG/OB UMMC

## 2024-02-05 PROCEDURE — 83735 ASSAY OF MAGNESIUM: CPT

## 2024-02-05 PROCEDURE — 36592 COLLECT BLOOD FROM PICC: CPT

## 2024-02-05 PROCEDURE — 86900 BLOOD TYPING SEROLOGIC ABO: CPT | Performed by: STUDENT IN AN ORGANIZED HEALTH CARE EDUCATION/TRAINING PROGRAM

## 2024-02-05 PROCEDURE — 84100 ASSAY OF PHOSPHORUS: CPT

## 2024-02-05 RX ORDER — MAGNESIUM OXIDE 400 MG/1
400 TABLET ORAL EVERY 4 HOURS
Status: COMPLETED | OUTPATIENT
Start: 2024-02-05 | End: 2024-02-05

## 2024-02-05 RX ORDER — POTASSIUM CHLORIDE 750 MG/1
20 TABLET, EXTENDED RELEASE ORAL ONCE
Status: COMPLETED | OUTPATIENT
Start: 2024-02-05 | End: 2024-02-05

## 2024-02-05 RX ADMIN — HYDROXYZINE HYDROCHLORIDE 25 MG: 25 TABLET, FILM COATED ORAL at 18:52

## 2024-02-05 RX ADMIN — Medication 5 ML: at 06:29

## 2024-02-05 RX ADMIN — HEPARIN SODIUM 5000 UNITS: 5000 INJECTION, SOLUTION INTRAVENOUS; SUBCUTANEOUS at 20:10

## 2024-02-05 RX ADMIN — Medication 150 MG: at 22:17

## 2024-02-05 RX ADMIN — Medication 10 ML: at 22:20

## 2024-02-05 RX ADMIN — Medication 5 ML: at 17:15

## 2024-02-05 RX ADMIN — ACETAMINOPHEN 650 MG: 325 TABLET, FILM COATED ORAL at 20:24

## 2024-02-05 RX ADMIN — PANTOPRAZOLE SODIUM 40 MG: 40 TABLET, DELAYED RELEASE ORAL at 07:56

## 2024-02-05 RX ADMIN — SENNOSIDES AND DOCUSATE SODIUM 1 TABLET: 8.6; 5 TABLET ORAL at 07:56

## 2024-02-05 RX ADMIN — SERTRALINE HYDROCHLORIDE 75 MG: 50 TABLET ORAL at 22:17

## 2024-02-05 RX ADMIN — HEPARIN SODIUM 5000 UNITS: 5000 INJECTION, SOLUTION INTRAVENOUS; SUBCUTANEOUS at 02:01

## 2024-02-05 RX ADMIN — MAGNESIUM OXIDE TAB 400 MG (241.3 MG ELEMENTAL MG) 400 MG: 400 (241.3 MG) TAB at 11:22

## 2024-02-05 RX ADMIN — CEFTRIAXONE SODIUM 2 G: 2 INJECTION, POWDER, FOR SOLUTION INTRAMUSCULAR; INTRAVENOUS at 16:30

## 2024-02-05 RX ADMIN — BUDESONIDE AND FORMOTEROL FUMARATE DIHYDRATE 2 PUFF: 80; 4.5 AEROSOL RESPIRATORY (INHALATION) at 20:09

## 2024-02-05 RX ADMIN — POTASSIUM CHLORIDE 20 MEQ: 750 TABLET, EXTENDED RELEASE ORAL at 11:22

## 2024-02-05 RX ADMIN — Medication 10 ML: at 03:58

## 2024-02-05 RX ADMIN — HEPARIN SODIUM 5000 UNITS: 5000 INJECTION, SOLUTION INTRAVENOUS; SUBCUTANEOUS at 11:22

## 2024-02-05 RX ADMIN — METOPROLOL TARTRATE 25 MG: 25 TABLET, FILM COATED ORAL at 07:56

## 2024-02-05 RX ADMIN — PRAZOSIN HYDROCHLORIDE 2 MG: 2 CAPSULE ORAL at 22:18

## 2024-02-05 RX ADMIN — METOPROLOL TARTRATE 25 MG: 25 TABLET, FILM COATED ORAL at 20:10

## 2024-02-05 RX ADMIN — MAGNESIUM OXIDE TAB 400 MG (241.3 MG ELEMENTAL MG) 400 MG: 400 (241.3 MG) TAB at 16:30

## 2024-02-05 RX ADMIN — CEFTRIAXONE SODIUM 2 G: 2 INJECTION, POWDER, FOR SOLUTION INTRAMUSCULAR; INTRAVENOUS at 02:00

## 2024-02-05 ASSESSMENT — ACTIVITIES OF DAILY LIVING (ADL)
ADLS_ACUITY_SCORE: 27
ADLS_ACUITY_SCORE: 23
ADLS_ACUITY_SCORE: 27
ADLS_ACUITY_SCORE: 23
ADLS_ACUITY_SCORE: 27
ADLS_ACUITY_SCORE: 23
ADLS_ACUITY_SCORE: 29
ADLS_ACUITY_SCORE: 23
ADLS_ACUITY_SCORE: 27
ADLS_ACUITY_SCORE: 27
ADLS_ACUITY_SCORE: 23
ADLS_ACUITY_SCORE: 27

## 2024-02-05 ASSESSMENT — VISUAL ACUITY: OU: BASELINE;GLASSES

## 2024-02-05 NOTE — PLAN OF CARE
Status: POD 6 removal of  shunt d/t infection and POD 5 LD placement.   Vitals:  VSS on RA. Continuous pulse ox in place.   Neuros: A&Ox4, numbness to R head incision.   IV: DL PICC HL  Resp/trach: WNL  Diet: Regular, Strict I&O's.  Bowel status: LBM 2/5  : Voiding spontaneously  Skin: LD incision, reinforced this shift around parameter.   Pain: Moderate HA, managed with PRN Tylenol and Oxycodone.   Activity: Ax1, GB, walker.  Plan: OR for  shunt reimplantation 2/8 0730. LD draining 10 mL/hr.

## 2024-02-05 NOTE — PLAN OF CARE
[id number]  Status: POD 6 removal of  shunt d/t infection and POD 5 LD placement   Vitals: VSS on RA. Continuous pulse ox   Neuros: A&O x4, n/t to R head incision. Strengths 5/5 t/o.   IV: DL PICC HL, blood return+     Labs/Electrolytes: Rechecks in AM    Resp/trach: LSC    Diet: Regular diet, strict I&Os.    Bowel status: Loose BM x1. BS+  : Voiding spontaneously   Skin: LD incision CDI, R head incision ROSIE. Bruising to LUE.  Pain: PRN tylenol and oxycodone for HA    Activity: A1 GB/walker    Social: Updating family independently     Plan: OR for  shunt reimplantation 2/8 0730. LD draining 10ml/hr.   Education completed: PICC education with aunt needed before discharge

## 2024-02-05 NOTE — PROGRESS NOTES
"[facility], [place]  Neurosurgery Progress Note:  02/05/2024      Interval History: NAEO. Tolerating lumbar drainage without signs of leak.     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 She is now POD-7 s/p removal of  shunt, PPD-6 s/p lumbar drain placement.    Clinically Significant Risk Factors                         # Severe Obesity: Estimated body mass index is 42.26 kg/m  as calculated from the following:    Height as of this encounter: 1.651 m (5' 5\").    Weight as of this encounter: 115.2 kg (253 lb 15.5 oz).      # Asthma: noted on problem list        Pre-operative anticoagulation/antiplatelet: none    Plan:  Serial neuro exams  Continue lumbar drainage 10/hr  ID consult, appreciate assistance  Ceftriaxone - 2 week course  PICC placed  Follow repeat blood cultures  Shunt replacement planned for 2/8 with general surgery  Diamox discontinued for low blood pressure, NO ALLERGY TO DIAMOX  HOB > 45 degrees  Pain control  Advance diet as tolerated  Bowel regimen  PRN antiemetics  IVF until taking adequate PO  PT/OT  SCDs for DVT proph, SQH    -----------------------------------  Donnie White MD on 2/4/2024  PGY3 Neurosurgery     Please contact neurosurgery resident on call with questions.    Dial * * *[phone], enter [phone] when prompted.   -----------------------------------    Gen: Sleeping comfortably in bed  Wound: incision clean dry intact, appropriately healing; no sign of leak at lumbar drain site  Neurologic:  Alert & Oriented to person, place, time, and situation  Follows commands briskly  Speech fluent, spontaneous. No aphasia or dysarthria.  No gaze preference. No apparent hemineglect.  PERRL, EOMI but painful  Face symmetric with sensation intact to light touch  Palate elevates symmetrically, uvula midline, tongue protrudes midline  Trapezii muscles 5/5 bilaterally  No " pronator drift     Del Tr Bi WE WF Gr   R 5 5 5 5 5 5   L 5 5 5 5 5 5    HF KE KF DF PF EHL   R 5 5 5 5 5 5   L 5 5 5 5 5 5     Reflexes 2+ throughout    Sensation intact and symmetric to light touch throughout    Objective:   Temp:  [97.6  F (36.4  C)-98.7  F (37.1  C)] 98.5  F (36.9  C)  Pulse:  [69-85] 77  Resp:  [14-18] 14  BP: (103-119)/(53-65) 109/57  SpO2:  [98 %-100 %] 98 %  I/O last 3 completed shifts:  In: 1950 [P.O.:1940; I.V.:10]  Out: 1490 [Urine:1250; Drains:240]        LABS:  Recent Labs   Lab 02/05/24  0637 02/04/24  0639 02/03/24  0727    138 136   POTASSIUM 3.8 3.6 3.7   CHLORIDE 107 108* 108*   CO2 19* 18* 16*   ANIONGAP 11 12 12   GLC 90 110* 92   BUN 15.4 16.9 14.7   CR 0.63 0.87 0.72   KEV 8.9 9.4 9.2       Recent Labs   Lab 02/05/24  0637   WBC 7.6   RBC 3.89   HGB 10.6*   HCT 32.2*   MCV 83   MCH 27.2   MCHC 32.9   RDW 14.0          IMAGING:  No results found for this or any previous visit (from the past 24 hour(s)).

## 2024-02-06 ENCOUNTER — APPOINTMENT (OUTPATIENT)
Dept: PHYSICAL THERAPY | Facility: CLINIC | Age: 24
End: 2024-02-06
Payer: COMMERCIAL

## 2024-02-06 LAB
ANION GAP SERPL CALCULATED.3IONS-SCNC: 13 MMOL/L (ref 7–15)
APPEARANCE CSF: CLEAR
APTT PPP: 29 SECONDS (ref 22–38)
BUN SERPL-MCNC: 11.1 MG/DL (ref 6–20)
CALCIUM SERPL-MCNC: 9.3 MG/DL (ref 8.6–10)
CHLORIDE SERPL-SCNC: 104 MMOL/L (ref 98–107)
COLOR CSF: COLORLESS
CREAT SERPL-MCNC: 0.56 MG/DL (ref 0.51–0.95)
DEPRECATED HCO3 PLAS-SCNC: 22 MMOL/L (ref 22–29)
EGFRCR SERPLBLD CKD-EPI 2021: >90 ML/MIN/1.73M2
ERYTHROCYTE [DISTWIDTH] IN BLOOD BY AUTOMATED COUNT: 13.9 % (ref 10–15)
GLUCOSE CSF-MCNC: 60 MG/DL (ref 40–70)
GLUCOSE SERPL-MCNC: 90 MG/DL (ref 70–99)
HCT VFR BLD AUTO: 31.5 % (ref 35–47)
HGB BLD-MCNC: 10.5 G/DL (ref 11.7–15.7)
INR PPP: 1.01 (ref 0.85–1.15)
LYMPH ABN NFR CSF MANUAL: 81 %
MAGNESIUM SERPL-MCNC: 1.9 MG/DL (ref 1.7–2.3)
MCH RBC QN AUTO: 26.8 PG (ref 26.5–33)
MCHC RBC AUTO-ENTMCNC: 33.3 G/DL (ref 31.5–36.5)
MCV RBC AUTO: 80 FL (ref 78–100)
MONOS+MACROS NFR CSF MANUAL: 19 %
NEUTROPHILS NFR CSF MANUAL: NORMAL %
PHOSPHATE SERPL-MCNC: 3.3 MG/DL (ref 2.5–4.5)
PLATELET # BLD AUTO: 302 10E3/UL (ref 150–450)
POTASSIUM SERPL-SCNC: 3.9 MMOL/L (ref 3.4–5.3)
PROT CSF-MCNC: 23.6 MG/DL (ref 15–45)
RBC # BLD AUTO: 3.92 10E6/UL (ref 3.8–5.2)
RBC # CSF MANUAL: 5 /UL (ref 0–2)
SODIUM SERPL-SCNC: 139 MMOL/L (ref 135–145)
TUBE # CSF: ABNORMAL
WBC # BLD AUTO: 7.5 10E3/UL (ref 4–11)
WBC # CSF MANUAL: 9 /UL (ref 0–5)

## 2024-02-06 PROCEDURE — 36592 COLLECT BLOOD FROM PICC: CPT

## 2024-02-06 PROCEDURE — 89051 BODY FLUID CELL COUNT: CPT

## 2024-02-06 PROCEDURE — 250N000013 HC RX MED GY IP 250 OP 250 PS 637: Performed by: NEUROLOGICAL SURGERY

## 2024-02-06 PROCEDURE — 85027 COMPLETE CBC AUTOMATED: CPT

## 2024-02-06 PROCEDURE — 120N000002 HC R&B MED SURG/OB UMMC

## 2024-02-06 PROCEDURE — 83735 ASSAY OF MAGNESIUM: CPT

## 2024-02-06 PROCEDURE — 250N000011 HC RX IP 250 OP 636

## 2024-02-06 PROCEDURE — 97116 GAIT TRAINING THERAPY: CPT | Mod: GP | Performed by: PHYSICAL THERAPIST

## 2024-02-06 PROCEDURE — 250N000013 HC RX MED GY IP 250 OP 250 PS 637

## 2024-02-06 PROCEDURE — 84100 ASSAY OF PHOSPHORUS: CPT

## 2024-02-06 PROCEDURE — 97110 THERAPEUTIC EXERCISES: CPT | Mod: GP | Performed by: PHYSICAL THERAPIST

## 2024-02-06 PROCEDURE — 84157 ASSAY OF PROTEIN OTHER: CPT

## 2024-02-06 PROCEDURE — 85610 PROTHROMBIN TIME: CPT | Performed by: STUDENT IN AN ORGANIZED HEALTH CARE EDUCATION/TRAINING PROGRAM

## 2024-02-06 PROCEDURE — 80048 BASIC METABOLIC PNL TOTAL CA: CPT

## 2024-02-06 PROCEDURE — 87075 CULTR BACTERIA EXCEPT BLOOD: CPT

## 2024-02-06 PROCEDURE — 87070 CULTURE OTHR SPECIMN AEROBIC: CPT

## 2024-02-06 PROCEDURE — 87102 FUNGUS ISOLATION CULTURE: CPT

## 2024-02-06 PROCEDURE — 97530 THERAPEUTIC ACTIVITIES: CPT | Mod: GP | Performed by: PHYSICAL THERAPIST

## 2024-02-06 PROCEDURE — 85730 THROMBOPLASTIN TIME PARTIAL: CPT | Performed by: STUDENT IN AN ORGANIZED HEALTH CARE EDUCATION/TRAINING PROGRAM

## 2024-02-06 PROCEDURE — 82945 GLUCOSE OTHER FLUID: CPT

## 2024-02-06 RX ADMIN — PRAZOSIN HYDROCHLORIDE 2 MG: 2 CAPSULE ORAL at 21:59

## 2024-02-06 RX ADMIN — BUDESONIDE AND FORMOTEROL FUMARATE DIHYDRATE 2 PUFF: 80; 4.5 AEROSOL RESPIRATORY (INHALATION) at 20:11

## 2024-02-06 RX ADMIN — PANTOPRAZOLE SODIUM 40 MG: 40 TABLET, DELAYED RELEASE ORAL at 08:00

## 2024-02-06 RX ADMIN — CEFTRIAXONE SODIUM 2 G: 2 INJECTION, POWDER, FOR SOLUTION INTRAMUSCULAR; INTRAVENOUS at 04:05

## 2024-02-06 RX ADMIN — HYDROXYZINE HYDROCHLORIDE 25 MG: 25 TABLET, FILM COATED ORAL at 08:00

## 2024-02-06 RX ADMIN — Medication 10 ML: at 22:01

## 2024-02-06 RX ADMIN — CEFTRIAXONE SODIUM 2 G: 2 INJECTION, POWDER, FOR SOLUTION INTRAMUSCULAR; INTRAVENOUS at 16:02

## 2024-02-06 RX ADMIN — METOPROLOL TARTRATE 25 MG: 25 TABLET, FILM COATED ORAL at 20:12

## 2024-02-06 RX ADMIN — ACETAMINOPHEN 650 MG: 325 TABLET, FILM COATED ORAL at 08:00

## 2024-02-06 RX ADMIN — Medication 150 MG: at 21:59

## 2024-02-06 RX ADMIN — SERTRALINE HYDROCHLORIDE 75 MG: 50 TABLET ORAL at 21:59

## 2024-02-06 RX ADMIN — HYDROXYZINE HYDROCHLORIDE 25 MG: 25 TABLET, FILM COATED ORAL at 15:06

## 2024-02-06 RX ADMIN — BUDESONIDE AND FORMOTEROL FUMARATE DIHYDRATE 2 PUFF: 80; 4.5 AEROSOL RESPIRATORY (INHALATION) at 08:02

## 2024-02-06 RX ADMIN — Medication 5 ML: at 07:12

## 2024-02-06 RX ADMIN — HEPARIN SODIUM 5000 UNITS: 5000 INJECTION, SOLUTION INTRAVENOUS; SUBCUTANEOUS at 20:12

## 2024-02-06 RX ADMIN — METOPROLOL TARTRATE 25 MG: 25 TABLET, FILM COATED ORAL at 08:00

## 2024-02-06 RX ADMIN — CYCLOBENZAPRINE 10 MG: 10 TABLET, FILM COATED ORAL at 20:22

## 2024-02-06 RX ADMIN — HEPARIN SODIUM 5000 UNITS: 5000 INJECTION, SOLUTION INTRAVENOUS; SUBCUTANEOUS at 04:05

## 2024-02-06 RX ADMIN — ACETAMINOPHEN 650 MG: 325 TABLET, FILM COATED ORAL at 15:06

## 2024-02-06 ASSESSMENT — ACTIVITIES OF DAILY LIVING (ADL)
ADLS_ACUITY_SCORE: 29
ADLS_ACUITY_SCORE: 27
ADLS_ACUITY_SCORE: 29
ADLS_ACUITY_SCORE: 27
ADLS_ACUITY_SCORE: 29
ADLS_ACUITY_SCORE: 27
ADLS_ACUITY_SCORE: 27
ADLS_ACUITY_SCORE: 29
ADLS_ACUITY_SCORE: 29

## 2024-02-06 NOTE — PROVIDER NOTIFICATION
Leigh Sloan, [id number]    FYI, pt did mention some new slight numbness to the L side  both BUE and BLE, other than that she seem fine neuro wise and LD site is intact, no drainage observed.    Thanks,  NIGEL Deluca

## 2024-02-06 NOTE — PLAN OF CARE
"Status: POD 6 removal of  shunt d/t infection and POD 5 LD placement.   Vitals:  VSS on RA. Continuous pulse ox in place.   Neuros: A&Ox4, numbness to R head incision Atarax x1 for anxiety and crying this afternoon. Slight photophobia remains, but improving  IV: DL PICC HL between abx  Resp/trach: WNL  Diet: Regular, Strict I&O's. Fair intake   Bowel status: LBM 2/5  : Voiding spontaneously  Skin: LD incision, reinforced this shift around parameter.   Pain: Denies headache  Activity: Ax1, GB, walker. Walked in adler x1 this afternoon, became slightly dizzy while walking. Used wheelchair to return to room. BP and HR WNL  Plan: OR for  shunt reimplantation 2/8 0730. LD draining 10 mL/hr.    1915: NSG changed LD dressing per pt request. She stated \"it feels like water running down my back.\" No apparent leakage or drainage from site. Pt sweaty at times.           "

## 2024-02-06 NOTE — PLAN OF CARE
Goal Outcome Evaluation:      Plan of Care Reviewed With: patient    Overall Patient Progress: improvingOverall Patient Progress: improving    Status: POD #7, s/p removal of  shunt, POD #6 s/p lumbar drain placement  Vitals: VSS on RA, expt intermittently tachy in the 100's  Neuros: A&Ox4, c/o of slight numbness to L side of body, team made aware, strengths 5/5, t/o, R head incision numbness per pt, slight photophobia--improving per pt.   IV: double lumen, HL  Resp/trach: on RA, no SOB  Diet: Regular, strict I`s and O`s  Bowel status: LBM 2/5, loose per pt, bowel meds held  : voids spont  Skin: R head incision, ROSIE, LD site--new dressing was applied on previous shift.   Pain: PRN tylenol given for pain  Activity: Ax1 with GB, IV pole pivot  Social: observed crocheting   Plan: OR for  reimplantation 2/8 at 0730, LD draining 10ml/hr

## 2024-02-06 NOTE — PROGRESS NOTES
"[facility], [place]  Neurosurgery Progress Note:  02/06/2024      Interval History: NAEO. Tolerating lumbar drainage without signs of leak.     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 She is now POD-8 s/p removal of  shunt, PPD-7 s/p lumbar drain placement.    Clinically Significant Risk Factors                         # Severe Obesity: Estimated body mass index is 42.26 kg/m  as calculated from the following:    Height as of this encounter: 1.651 m (5' 5\").    Weight as of this encounter: 115.2 kg (253 lb 15.5 oz).      # Asthma: noted on problem list        Pre-operative anticoagulation/antiplatelet: none    Plan:  CSF surveillance today  Serial neuro exams  Continue lumbar drainage 10/hr  ID consult, appreciate assistance  Ceftriaxone - 2 week course  PICC placed  Follow repeat blood cultures  Shunt replacement planned for 2/8 with general surgery  Diamox discontinued for low blood pressure, NO ALLERGY TO DIAMOX  HOB > 45 degrees  Pain control  Advance diet as tolerated  Bowel regimen  PRN antiemetics  IVF until taking adequate PO  PT/OT  SCDs for DVT proph, SQH    -----------------------------------  Donnie White MD on 2/4/2024  PGY3 Neurosurgery     Please contact neurosurgery resident on call with questions.    Dial * * *[phone], enter [phone] when prompted.   -----------------------------------    Gen: Sleeping comfortably in bed  Wound: incision clean dry intact, appropriately healing; no sign of leak at lumbar drain site  Neurologic:  Alert & Oriented to person, place, time, and situation  Follows commands briskly  Speech fluent, spontaneous. No aphasia or dysarthria.  No gaze preference. No apparent hemineglect.  PERRL, EOMI but painful  Face symmetric with sensation intact to light touch  Palate elevates symmetrically, uvula midline, tongue protrudes midline  Trapezii muscles " 5/5 bilaterally  No pronator drift     Del Tr Bi WE WF Gr   R 5 5 5 5 5 5   L 5 5 5 5 5 5    HF KE KF DF PF EHL   R 5 5 5 5 5 5   L 5 5 5 5 5 5     Reflexes 2+ throughout    Sensation intact and symmetric to light touch throughout    Objective:   Temp:  [98.1  F (36.7  C)-99.4  F (37.4  C)] 98.1  F (36.7  C)  Pulse:  [71-97] 76  Resp:  [16] 16  BP: (109-119)/(51-72) 117/51  SpO2:  [96 %-100 %] 99 %  I/O last 3 completed shifts:  In: 360 [P.O.:360]  Out: 1930 [Urine:1700; Drains:230]        LABS:  Recent Labs   Lab 02/06/24  0721 02/05/24  0637 02/04/24  0639    137 138   POTASSIUM 3.9 3.8 3.6   CHLORIDE 104 107 108*   CO2 22 19* 18*   ANIONGAP 13 11 12   GLC 90 90 110*   BUN 11.1 15.4 16.9   CR 0.56 0.63 0.87   KEV 9.3 8.9 9.4       Recent Labs   Lab 02/06/24  0721   WBC 7.5   RBC 3.92   HGB 10.5*   HCT 31.5*   MCV 80   MCH 26.8   MCHC 33.3   RDW 13.9          IMAGING:  No results found for this or any previous visit (from the past 24 hour(s)).

## 2024-02-06 NOTE — PLAN OF CARE
Status: POD #7, s/p removal of  shunt, POD #6 s/p lumbar drain placement  Vitals: VSS on RA, expt intermittently tachy in the 100's  Neuros: A&Ox4, c/o of slight numbness to L side of body, team aware, R head incision numbness per pt, slight photophobia--improving per pt, noting some metallic taste this afternoon, providers notified and unconcerned.   IV: double lumen, HL, dressing changed today.  Resp/trach: on RA, no SOB  Diet: Regular, strict I`s and O`s  Bowel status: LBM 2/6, bowel meds held  : voids spont  Skin: R head incision, ROSIE, LD site--new dressing was applied yesterday, reinforced today.  Pain: PRN tylenol given for pain, atarax for anxiety.  Activity: Ax1 with GB, I  Social: observed crocheting   Plan: OR for  reimplantation 2/8 at 0730, LD draining 10ml/hr

## 2024-02-07 ENCOUNTER — ANESTHESIA EVENT (OUTPATIENT)
Dept: SURGERY | Facility: CLINIC | Age: 24
End: 2024-02-07
Payer: COMMERCIAL

## 2024-02-07 ENCOUNTER — APPOINTMENT (OUTPATIENT)
Dept: PHYSICAL THERAPY | Facility: CLINIC | Age: 24
End: 2024-02-07
Payer: COMMERCIAL

## 2024-02-07 LAB
ANION GAP SERPL CALCULATED.3IONS-SCNC: 12 MMOL/L (ref 7–15)
BACTERIA SPEC CULT: ABNORMAL
BACTERIA SPEC CULT: NORMAL
BUN SERPL-MCNC: 12 MG/DL (ref 6–20)
CALCIUM SERPL-MCNC: 9.2 MG/DL (ref 8.6–10)
CHLORIDE SERPL-SCNC: 107 MMOL/L (ref 98–107)
CREAT SERPL-MCNC: 0.59 MG/DL (ref 0.51–0.95)
DEPRECATED HCO3 PLAS-SCNC: 23 MMOL/L (ref 22–29)
EGFRCR SERPLBLD CKD-EPI 2021: >90 ML/MIN/1.73M2
ERYTHROCYTE [DISTWIDTH] IN BLOOD BY AUTOMATED COUNT: 13.9 % (ref 10–15)
GLUCOSE SERPL-MCNC: 92 MG/DL (ref 70–99)
HCT VFR BLD AUTO: 32.5 % (ref 35–47)
HGB BLD-MCNC: 10.8 G/DL (ref 11.7–15.7)
MAGNESIUM SERPL-MCNC: 1.9 MG/DL (ref 1.7–2.3)
MCH RBC QN AUTO: 27.3 PG (ref 26.5–33)
MCHC RBC AUTO-ENTMCNC: 33.2 G/DL (ref 31.5–36.5)
MCV RBC AUTO: 82 FL (ref 78–100)
PHOSPHATE SERPL-MCNC: 3.8 MG/DL (ref 2.5–4.5)
PLATELET # BLD AUTO: 308 10E3/UL (ref 150–450)
POTASSIUM SERPL-SCNC: 3.9 MMOL/L (ref 3.4–5.3)
RBC # BLD AUTO: 3.95 10E6/UL (ref 3.8–5.2)
SODIUM SERPL-SCNC: 142 MMOL/L (ref 135–145)
WBC # BLD AUTO: 7.2 10E3/UL (ref 4–11)

## 2024-02-07 PROCEDURE — 250N000013 HC RX MED GY IP 250 OP 250 PS 637

## 2024-02-07 PROCEDURE — 250N000013 HC RX MED GY IP 250 OP 250 PS 637: Performed by: NURSE PRACTITIONER

## 2024-02-07 PROCEDURE — 80048 BASIC METABOLIC PNL TOTAL CA: CPT

## 2024-02-07 PROCEDURE — 250N000011 HC RX IP 250 OP 636

## 2024-02-07 PROCEDURE — 97116 GAIT TRAINING THERAPY: CPT | Mod: GP | Performed by: PHYSICAL THERAPIST

## 2024-02-07 PROCEDURE — 83735 ASSAY OF MAGNESIUM: CPT

## 2024-02-07 PROCEDURE — 85027 COMPLETE CBC AUTOMATED: CPT

## 2024-02-07 PROCEDURE — 84100 ASSAY OF PHOSPHORUS: CPT

## 2024-02-07 PROCEDURE — 36592 COLLECT BLOOD FROM PICC: CPT

## 2024-02-07 PROCEDURE — 250N000013 HC RX MED GY IP 250 OP 250 PS 637: Performed by: NEUROLOGICAL SURGERY

## 2024-02-07 PROCEDURE — 120N000002 HC R&B MED SURG/OB UMMC

## 2024-02-07 PROCEDURE — 97530 THERAPEUTIC ACTIVITIES: CPT | Mod: GP | Performed by: PHYSICAL THERAPIST

## 2024-02-07 RX ORDER — CEFAZOLIN SODIUM/WATER 2 G/20 ML
2 SYRINGE (ML) INTRAVENOUS SEE ADMIN INSTRUCTIONS
Status: DISCONTINUED | OUTPATIENT
Start: 2024-02-07 | End: 2024-02-08 | Stop reason: HOSPADM

## 2024-02-07 RX ORDER — CEFAZOLIN SODIUM/WATER 2 G/20 ML
2 SYRINGE (ML) INTRAVENOUS
Status: COMPLETED | OUTPATIENT
Start: 2024-02-07 | End: 2024-02-08

## 2024-02-07 RX ADMIN — CYCLOBENZAPRINE 10 MG: 10 TABLET, FILM COATED ORAL at 08:01

## 2024-02-07 RX ADMIN — CEFTRIAXONE SODIUM 2 G: 2 INJECTION, POWDER, FOR SOLUTION INTRAMUSCULAR; INTRAVENOUS at 15:56

## 2024-02-07 RX ADMIN — SERTRALINE HYDROCHLORIDE 75 MG: 50 TABLET ORAL at 22:06

## 2024-02-07 RX ADMIN — Medication 5 ML: at 06:15

## 2024-02-07 RX ADMIN — OXYCODONE HYDROCHLORIDE 5 MG: 5 TABLET ORAL at 18:01

## 2024-02-07 RX ADMIN — BUDESONIDE AND FORMOTEROL FUMARATE DIHYDRATE 2 PUFF: 80; 4.5 AEROSOL RESPIRATORY (INHALATION) at 08:01

## 2024-02-07 RX ADMIN — HEPARIN SODIUM 5000 UNITS: 5000 INJECTION, SOLUTION INTRAVENOUS; SUBCUTANEOUS at 13:08

## 2024-02-07 RX ADMIN — ACETAMINOPHEN 650 MG: 325 TABLET, FILM COATED ORAL at 18:01

## 2024-02-07 RX ADMIN — METOPROLOL TARTRATE 25 MG: 25 TABLET, FILM COATED ORAL at 20:09

## 2024-02-07 RX ADMIN — Medication 150 MG: at 22:07

## 2024-02-07 RX ADMIN — HEPARIN SODIUM 5000 UNITS: 5000 INJECTION, SOLUTION INTRAVENOUS; SUBCUTANEOUS at 04:27

## 2024-02-07 RX ADMIN — HYDROXYZINE HYDROCHLORIDE 25 MG: 25 TABLET, FILM COATED ORAL at 20:09

## 2024-02-07 RX ADMIN — SENNOSIDES AND DOCUSATE SODIUM 1 TABLET: 8.6; 5 TABLET ORAL at 20:09

## 2024-02-07 RX ADMIN — Medication 10 ML: at 20:10

## 2024-02-07 RX ADMIN — HEPARIN SODIUM 5000 UNITS: 5000 INJECTION, SOLUTION INTRAVENOUS; SUBCUTANEOUS at 22:06

## 2024-02-07 RX ADMIN — PRAZOSIN HYDROCHLORIDE 2 MG: 2 CAPSULE ORAL at 22:07

## 2024-02-07 RX ADMIN — BUDESONIDE AND FORMOTEROL FUMARATE DIHYDRATE 2 PUFF: 80; 4.5 AEROSOL RESPIRATORY (INHALATION) at 20:24

## 2024-02-07 RX ADMIN — METOPROLOL TARTRATE 25 MG: 25 TABLET, FILM COATED ORAL at 07:55

## 2024-02-07 RX ADMIN — CEFTRIAXONE SODIUM 2 G: 2 INJECTION, POWDER, FOR SOLUTION INTRAMUSCULAR; INTRAVENOUS at 04:27

## 2024-02-07 RX ADMIN — PANTOPRAZOLE SODIUM 40 MG: 40 TABLET, DELAYED RELEASE ORAL at 07:54

## 2024-02-07 ASSESSMENT — ACTIVITIES OF DAILY LIVING (ADL)
ADLS_ACUITY_SCORE: 27
ADLS_ACUITY_SCORE: 26
ADLS_ACUITY_SCORE: 27
ADLS_ACUITY_SCORE: 26
ADLS_ACUITY_SCORE: 27
ADLS_ACUITY_SCORE: 27
ADLS_ACUITY_SCORE: 26
ADLS_ACUITY_SCORE: 27
ADLS_ACUITY_SCORE: 26

## 2024-02-07 ASSESSMENT — ENCOUNTER SYMPTOMS
SEIZURES: 0
DYSRHYTHMIAS: 1

## 2024-02-07 ASSESSMENT — LIFESTYLE VARIABLES: TOBACCO_USE: 0

## 2024-02-07 NOTE — ANESTHESIA PREPROCEDURE EVALUATION
Anesthesia Pre-Procedure Evaluation    Patient: Leigh Sloan   MRN: [id number] : 2000        Procedure : Procedure(s):  Stealth assisted RIGHT POSSIBLE LEFT Implant Ventriculoperitoneal Shunt  possible Laparoscopic Assisted Implant Ventriculoperitoneal Shunt          Past Medical History:   Diagnosis Date    Acid reflux     Anxiety     Asthma     Depression     High cholesterol     IIH (idiopathic intracranial hypertension)     Non-alcoholic fatty liver disease     PCOS (polycystic ovarian syndrome)     PTSD (post-traumatic stress disorder)     Scoliosis     Tachycardia       Past Surgical History:   Procedure Laterality Date    CV HEART CATHETERIZATION WITH POSSIBLE INTERVENTION      IMPLANT SHUNT VENTRICULOPERITONEAL      IR LUMBAR DRAIN PLACEMENT W FLUORO  2024    OPTICAL TRACKING SYSTEM IMPLANT SHUNT VENTRICULOPERITONEAL Right 2024    Procedure: Stealth guided right sided ventriculoperitoneal shunt exploration and replacement of right sided proximal ventricular catheter and shunt valve;  Surgeon: Calvin Cabrera MD;  Location: [facility] OR    PICC DOUBLE LUMEN PLACEMENT Left 2024    left basilic 4 fr dl power picc 48 cm    REVISE SHUNT VENTRICULARPERITONEAL Right 2024    Procedure: REMOVAL, SHUNT, VENTRICULOPERITONEAL;  Surgeon: Calvin Cabrera MD;  Location: [facility] OR      Allergies   Allergen Reactions    Sulfa Antibiotics Unknown    Sulfamethoxazole-Trimethoprim Nausea and Nausea and Vomiting    Trimethoprim Rash      Social History     Tobacco Use    Smoking status: Never    Smokeless tobacco: Never   Substance Use Topics    Alcohol use: Not Currently      Wt Readings from Last 1 Encounters:   24 115.2 kg (253 lb 15.5 oz)        Anesthesia Evaluation   Pt has had prior anesthetic. Type: General and MAC.    History of anesthetic complications (Agitated wakeup)  - PONV.      ROS/MED HX  ENT/Pulmonary:     (+)     EDDA risk factors,   obese,  observed stopped  breathing,           Mild Persistent, asthma Last exacerbation: 12/29/2023,  Treatment: Oral steroids and Inhaled steroids,       recent URI,  resolving, completed antibiotics and steroid dosing:     (-) tobacco use   Neurologic: Comment: IIH s/p R  shunt with question on functionality  Recurrent papilledema    (+)      migraines,                       (-) no seizures and no CVA   Cardiovascular: Comment: Tachycardia with negative work up, on metprolol    (+) Dyslipidemia - -   -  - -                        dysrhythmias, Other,        Previous cardiac testing  (-) taking anticoagulants/antiplatelets and URRUTIA   METS/Exercise Tolerance: 3 - Able to walk 1-2 blocks without stopping    Hematologic:    (-) history of blood clots and history of blood transfusion   Musculoskeletal: Comment: Reports possible fibromyalgia, scoliosis, and inflammation       GI/Hepatic: Comment: Fatty liver    (+) GERD, Asymptomatic on medication,           liver disease,       Renal/Genitourinary:  - neg Renal ROS     Endo: Comment: PCOS    (+)               Obesity,    (-) chronic steroid usage   Psychiatric/Substance Use:     (+) psychiatric history anxiety, depression and other (comment) (PTSD)   Recreational drug usage: Cannabis (Occ edibles).    Infectious Disease:  - neg infectious disease ROS     Malignancy:  - neg malignancy ROS     Other:  - neg other ROS    (+)  , H/O Chronic Pain,         Physical Exam    Airway        Mallampati: II   TM distance: > 3 FB   Neck ROM: full   Mouth opening: > 3 cm    Respiratory Devices and Support         Dental       (+) Minor Abnormalities - some fillings, tiny chips      Cardiovascular   cardiovascular exam normal          Pulmonary   pulmonary exam normal                OUTSIDE LABS:  CBC:   Lab Results   Component Value Date    WBC 7.2 02/07/2024    WBC 7.5 02/06/2024    HGB 10.8 (L) 02/07/2024    HGB 10.5 (L) 02/06/2024    HCT 32.5 (L) 02/07/2024    HCT 31.5 (L) 02/06/2024      "02/07/2024     02/06/2024     BMP:   Lab Results   Component Value Date     02/07/2024     02/06/2024    POTASSIUM 3.9 02/07/2024    POTASSIUM 3.9 02/06/2024    CHLORIDE 107 02/07/2024    CHLORIDE 104 02/06/2024    CO2 23 02/07/2024    CO2 22 02/06/2024    BUN 12.0 02/07/2024    BUN 11.1 02/06/2024    CR 0.59 02/07/2024    CR 0.56 02/06/2024    GLC 92 02/07/2024    GLC 90 02/06/2024     COAGS:   Lab Results   Component Value Date    PTT 29 02/06/2024    INR 1.01 02/06/2024     POC:   Lab Results   Component Value Date    HCGS Negative 01/29/2024     HEPATIC: No results found for: \"ALBUMIN\", \"PROTTOTAL\", \"ALT\", \"AST\", \"GGT\", \"ALKPHOS\", \"BILITOTAL\", \"BILIDIRECT\", \"BRET\"  OTHER:   Lab Results   Component Value Date    KEV 9.2 02/07/2024    PHOS 3.8 02/07/2024    MAG 1.9 02/07/2024    SED 26 (H) 01/29/2024       Anesthesia Plan    ASA Status:  2       Anesthesia Type: General.     - Airway: ETT   Induction: Intravenous.   Maintenance: Balanced.   Techniques and Equipment:     - Lines/Monitors: 2nd IV, BIS     Consents    Anesthesia Plan(s) and associated risks, benefits, and realistic alternatives discussed. Questions answered and patient/representative(s) expressed understanding.     - Discussed:     - Discussed with:  Patient      - Extended Intubation/Ventilatory Support Discussed: No.      - Patient is DNR/DNI Status: No     Use of blood products discussed: No .     Postoperative Care    Pain management: IV analgesics, Oral pain medications, Multi-modal analgesia.   PONV prophylaxis: Ondansetron (or other 5HT-3), Dexamethasone or Solumedrol, Aprepitant, Scopolamine patch     Comments:               Jakob Vang MD    I have reviewed the pertinent notes and labs in the chart from the past 30 days and (re)examined the patient.  Any updates or changes from those notes are reflected in this note.             "

## 2024-02-07 NOTE — PROGRESS NOTES
"CLINICAL NUTRITION SERVICES - ASSESSMENT NOTE     Nutrition Prescription    RECOMMENDATIONS FOR MDs/PROVIDERS TO ORDER:   None at present    Malnutrition Status:   Patient does not meet two of the established criteria necessary for diagnosing malnutrition.    Recommendations already ordered by Registered Dietitian (RD):   - Nutrition Education: Commended pt on maintaining intake and encouraged small, frequent meals.   - Medical food supplement therapy: Placed PRN order and provided snacks/supplement list.   - Ordered updated weight as able.     Future/Additional Recommendations:   - Monitor PO intake and weight trends     REASON FOR ASSESSMENT   Leigh Sloan is a/an 23 year old female assessed by the dietitian for LOS    PMHx   Per H&P,   \"history of idiopathic intracranial hypertension s/p right occipital ventriculoperitoneal shunt (Codman Hakim 120) placement in 2020, replaced (Certas at 4) recently on 1/22/2024 with Dr. Cabrera for elevated ICP on lumbar puncture and recurrent papilledema on ophthalmologic evaluation.\"    NUTRITION HISTORY   Met with pt at bedside. Pt reported having a good appetite of consuming 2 meals and 2 snacks daily. She commented on her aunt bringing in outside food throughout the week. Pt reported to meet with a RD for weight loss, with intentionally losing weight. She reports a UBW of 267 lb. When asked about snacks/supplements, pt requested flexibility to order options and to review snacks/supplement list. Commended pt on maintaining intake and encouraged small, frequent meals to avoid going long periods without food. Pt acknowledged and had no further questions at this time.     Nutrition/GI:   Last BM 2/6 loose stools recorded    Skin: Shubham score 19 with nutrition marked as adequate per flowsheets. Per RN note 2/6, \"R head incision, ROSIE, LD site--new dressing was applied yesterday, reinforced today\"    CURRENT NUTRITION ORDERS   Diet: Regular    Intake/Tolerance: Pt " "reported to consume peanut butter crackers before visit.   Per flowsheets, intake documentation likely missing some meals/snacks, but on average 100% intake of meals documented. 0% intake recorded for breakfast 2/3. Per 7 day average, pt is ordering 1420 kcal and 54 g protein per [employer].    LABS   Labs Reviewed   02/06/24 07:21 02/07/24 06:51   Sodium 139 142   Potassium 3.9 3.9   Creatinine 0.56 0.59   Magnesium 1.9 1.9   Phosphorus 3.3 3.8   Glucose 90 92   Hemoglobin 10.5 (L) 10.8 (L)   Platelet Count 302 308     MEDICATIONS   Medications reviewed  - Ceftriaxone   - Protonix   - Scheduled bowel regimen   - Potassium    - Seroquel   - Sertraline   - Oxycodone PRN     ANTHROPOMETRICS  Height: 165.1 cm (5' 5\")  Most Recent Weight: 115.2 kg (253 lb 15.5 oz)    IBW: 56.8 kg  BMI: 42.3 - Obesity Grade III BMI >40  Weight History:   Pt with 5 lb (1.8 %) weight loss over 1 week.   Pt with 5 lb (1.7 %) weight loss over 3 months.   Wt Readings from Last Encounters:   01/29/24 115.2 kg (253 lb 15.5 oz)   01/22/24 117.3 kg (258 lb 9.6 oz)   01/10/24 119.8 kg (264 lb 3.2 oz)   01/03/24 120.4 kg (265 lb 8 oz)   12/14/23 118.8 kg (262 lb)   12/13/23 118.9 kg (262 lb 1.6 oz)     Care Everywhere weights  11/10/23 119.5 kg (263 lb 6.4 oz)   10/24/23 117.2 kg (258 lb 4.8 oz)   09/28/23 116.1 kg (255 lb 14.4 oz)   09/19/23 114.9 kg (253 lb 6.4 oz)   09/13/23 117 kg (258 lb)   09/12/23 117 kg (258 lb)   09/12/23 117.4 kg (258 lb 14.4 oz)   08/10/23 115.7 kg (255 lb)   08/02/23 115.7 kg (255 lb)     Dosing Weight: 71 kg (adjusted based on recent weight 115.2 kg and IBW)     ASSESSED NUTRITION NEEDS   Estimated Energy Needs: 1420 - 1775 kcals/day (20 - 25 kcals/kg)  Justification: Maintenance r/t obesity    Estimated Protein Needs: 55 - 85 grams protein/day (0.8 - 1.2 grams of pro/kg)  Justification: Maintenance vs Increased needs  Estimated Fluid Needs: 1 mL/kcal   Justification: Maintenance or per provider pending fluid " status    PHYSICAL FINDINGS   See malnutrition section below.     MALNUTRITION   % Intake: No decreased intake noted  % Weight Loss: 1-2% in 1 week (moderate) - intentional weight loss  Subcutaneous Fat Loss: None observed  Muscle Loss: None observed  Fluid Accumulation/Edema: None noted  Malnutrition Diagnosis: Patient does not meet two of the established criteria necessary for diagnosing malnutrition     NUTRITION DIAGNOSIS   No nutrition diagnosis at this time.    INTERVENTIONS   Implementation   - Nutrition Education: Commended pt on maintaining intake and encouraged small, frequent meals.   - Medical food supplement therapy: Placed PRN order and provided snacks/supplement list.   -  Ordered updated weight as able.     Goals   Patient to consume % of nutritionally adequate meal trays TID, or the equivalent with supplements/snacks.     Monitoring/Evaluation   Progress toward goals will be monitored and evaluated per protocol.  Diamond Baldwin  Dietetic Intern

## 2024-02-07 NOTE — PROGRESS NOTES
Brief note:    Discussed with bedside RN. Preop orders for  shunt for tomorrow would be released by the nurse. Patient would be given chlorhexidine bath and head shampoo before CT Head is performed for preop planning for the procedure.    Donnie Ungerwan  Neurosurgery Resident PGY3

## 2024-02-07 NOTE — PLAN OF CARE
Status: POD-9 s/p removal of  shunt, POD-8 s/p lumbar drain placement.   Vitals: VSS on RA, intermittently tachy to low 100s  Neuros: A&Ox4, 5/5 t/o. Slight photophobia. Numbness to R head incision. Denies metallic taste and L body numbness.  IV: DL PICC HL  Labs/Electrolytes: WNL  Resp/trach: LSC  Diet: Regular, strict I&Os  Bowel status: LBM 2/6  : voiding spontaneously in the BR  Skin: LD dressing CDI - reinforced. R head incision ROSIE.  Pain: Flexeril x1 for back/hip pain w/relief.  Activity: SBA, GB  Plan: Drain 10ml/hr from LD. OR for  shunt reimplantation 2/8 @ 0730. Continue to monitor and follow POC.

## 2024-02-08 ENCOUNTER — ANESTHESIA (OUTPATIENT)
Dept: SURGERY | Facility: CLINIC | Age: 24
End: 2024-02-08
Payer: COMMERCIAL

## 2024-02-08 ENCOUNTER — APPOINTMENT (OUTPATIENT)
Dept: CT IMAGING | Facility: CLINIC | Age: 24
End: 2024-02-08
Attending: STUDENT IN AN ORGANIZED HEALTH CARE EDUCATION/TRAINING PROGRAM
Payer: COMMERCIAL

## 2024-02-08 ENCOUNTER — APPOINTMENT (OUTPATIENT)
Dept: GENERAL RADIOLOGY | Facility: CLINIC | Age: 24
End: 2024-02-08
Payer: COMMERCIAL

## 2024-02-08 LAB
ABO/RH(D): NORMAL
ANION GAP SERPL CALCULATED.3IONS-SCNC: 14 MMOL/L (ref 7–15)
ANTIBODY SCREEN: NEGATIVE
APTT PPP: 27 SECONDS (ref 22–38)
BACTERIA SPEC CULT: ABNORMAL
BUN SERPL-MCNC: 14.5 MG/DL (ref 6–20)
CALCIUM SERPL-MCNC: 9.3 MG/DL (ref 8.6–10)
CHLORIDE SERPL-SCNC: 106 MMOL/L (ref 98–107)
CREAT SERPL-MCNC: 0.72 MG/DL (ref 0.51–0.95)
DEPRECATED HCO3 PLAS-SCNC: 22 MMOL/L (ref 22–29)
EGFRCR SERPLBLD CKD-EPI 2021: >90 ML/MIN/1.73M2
ERYTHROCYTE [DISTWIDTH] IN BLOOD BY AUTOMATED COUNT: 14.1 % (ref 10–15)
GLUCOSE BLDC GLUCOMTR-MCNC: 84 MG/DL (ref 70–99)
GLUCOSE SERPL-MCNC: 93 MG/DL (ref 70–99)
HCT VFR BLD AUTO: 33.2 % (ref 35–47)
HGB BLD-MCNC: 10.9 G/DL (ref 11.7–15.7)
INR PPP: 1 (ref 0.85–1.15)
MAGNESIUM SERPL-MCNC: 2.1 MG/DL (ref 1.7–2.3)
MCH RBC QN AUTO: 26.8 PG (ref 26.5–33)
MCHC RBC AUTO-ENTMCNC: 32.8 G/DL (ref 31.5–36.5)
MCV RBC AUTO: 82 FL (ref 78–100)
PHOSPHATE SERPL-MCNC: 3.7 MG/DL (ref 2.5–4.5)
PLATELET # BLD AUTO: 312 10E3/UL (ref 150–450)
POTASSIUM SERPL-SCNC: 4 MMOL/L (ref 3.4–5.3)
RBC # BLD AUTO: 4.07 10E6/UL (ref 3.8–5.2)
SODIUM SERPL-SCNC: 142 MMOL/L (ref 135–145)
SPECIMEN EXPIRATION DATE: NORMAL
WBC # BLD AUTO: 8 10E3/UL (ref 4–11)

## 2024-02-08 PROCEDURE — 250N000011 HC RX IP 250 OP 636: Performed by: STUDENT IN AN ORGANIZED HEALTH CARE EDUCATION/TRAINING PROGRAM

## 2024-02-08 PROCEDURE — 250N000011 HC RX IP 250 OP 636

## 2024-02-08 PROCEDURE — 278N000051 HC OR IMPLANT GENERAL: Performed by: NEUROLOGICAL SURGERY

## 2024-02-08 PROCEDURE — 36415 COLL VENOUS BLD VENIPUNCTURE: CPT

## 2024-02-08 PROCEDURE — 70450 CT HEAD/BRAIN W/O DYE: CPT | Mod: 76

## 2024-02-08 PROCEDURE — 61781 SCAN PROC CRANIAL INTRA: CPT | Mod: 78 | Performed by: NEUROLOGICAL SURGERY

## 2024-02-08 PROCEDURE — 85610 PROTHROMBIN TIME: CPT

## 2024-02-08 PROCEDURE — 86900 BLOOD TYPING SEROLOGIC ABO: CPT

## 2024-02-08 PROCEDURE — 250N000013 HC RX MED GY IP 250 OP 250 PS 637

## 2024-02-08 PROCEDURE — 710N000010 HC RECOVERY PHASE 1, LEVEL 2, PER MIN: Performed by: NEUROLOGICAL SURGERY

## 2024-02-08 PROCEDURE — 85730 THROMBOPLASTIN TIME PARTIAL: CPT

## 2024-02-08 PROCEDURE — 272N000001 HC OR GENERAL SUPPLY STERILE: Performed by: NEUROLOGICAL SURGERY

## 2024-02-08 PROCEDURE — 370N000017 HC ANESTHESIA TECHNICAL FEE, PER MIN: Performed by: NEUROLOGICAL SURGERY

## 2024-02-08 PROCEDURE — 250N000009 HC RX 250: Performed by: NURSE ANESTHETIST, CERTIFIED REGISTERED

## 2024-02-08 PROCEDURE — 70450 CT HEAD/BRAIN W/O DYE: CPT

## 2024-02-08 PROCEDURE — 120N000002 HC R&B MED SURG/OB UMMC

## 2024-02-08 PROCEDURE — 999N000065 XR SHUNT MALFUNCTION SURVEY

## 2024-02-08 PROCEDURE — 360N000086 HC SURGERY LEVEL 6 W/ FLUORO, PER MIN: Performed by: NEUROLOGICAL SURGERY

## 2024-02-08 PROCEDURE — 00163J6 BYPASS CEREBRAL VENTRICLE TO PERITONEAL CAVITY WITH SYNTHETIC SUBSTITUTE, PERCUTANEOUS APPROACH: ICD-10-PCS | Performed by: NEUROLOGICAL SURGERY

## 2024-02-08 PROCEDURE — 999N000141 HC STATISTIC PRE-PROCEDURE NURSING ASSESSMENT: Performed by: NEUROLOGICAL SURGERY

## 2024-02-08 PROCEDURE — 62223 ESTABLISH BRAIN CAVITY SHUNT: CPT | Mod: 62 | Performed by: NEUROLOGICAL SURGERY

## 2024-02-08 PROCEDURE — 74019 RADEX ABDOMEN 2 VIEWS: CPT | Mod: 26 | Performed by: RADIOLOGY

## 2024-02-08 PROCEDURE — 8E09XBZ COMPUTER ASSISTED PROCEDURE OF HEAD AND NECK REGION: ICD-10-PCS | Performed by: NEUROLOGICAL SURGERY

## 2024-02-08 PROCEDURE — 84100 ASSAY OF PHOSPHORUS: CPT

## 2024-02-08 PROCEDURE — 258N000003 HC RX IP 258 OP 636

## 2024-02-08 PROCEDURE — 250N000009 HC RX 250: Performed by: NEUROLOGICAL SURGERY

## 2024-02-08 PROCEDURE — C1894 INTRO/SHEATH, NON-LASER: HCPCS | Performed by: NEUROLOGICAL SURGERY

## 2024-02-08 PROCEDURE — 258N000003 HC RX IP 258 OP 636: Performed by: NURSE ANESTHETIST, CERTIFIED REGISTERED

## 2024-02-08 PROCEDURE — 250N000011 HC RX IP 250 OP 636: Performed by: NEUROLOGICAL SURGERY

## 2024-02-08 PROCEDURE — 83735 ASSAY OF MAGNESIUM: CPT

## 2024-02-08 PROCEDURE — 85027 COMPLETE CBC AUTOMATED: CPT

## 2024-02-08 PROCEDURE — 70250 X-RAY EXAM OF SKULL: CPT | Mod: 26 | Performed by: RADIOLOGY

## 2024-02-08 PROCEDURE — 80048 BASIC METABOLIC PNL TOTAL CA: CPT

## 2024-02-08 PROCEDURE — 62223 ESTABLISH BRAIN CAVITY SHUNT: CPT | Mod: 78 | Performed by: SURGERY

## 2024-02-08 PROCEDURE — 00PUX0Z REMOVAL OF DRAINAGE DEVICE FROM SPINAL CANAL, EXTERNAL APPROACH: ICD-10-PCS | Performed by: NEUROLOGICAL SURGERY

## 2024-02-08 PROCEDURE — 250N000011 HC RX IP 250 OP 636: Performed by: NURSE ANESTHETIST, CERTIFIED REGISTERED

## 2024-02-08 PROCEDURE — 71045 X-RAY EXAM CHEST 1 VIEW: CPT | Mod: 26 | Performed by: RADIOLOGY

## 2024-02-08 PROCEDURE — 70450 CT HEAD/BRAIN W/O DYE: CPT | Mod: 26 | Performed by: RADIOLOGY

## 2024-02-08 DEVICE — CATHETER 91101 VENTRICULAR ANTIMICROBIAL
Type: IMPLANTABLE DEVICE | Site: CRANIAL | Status: FUNCTIONAL
Brand: ARES™

## 2024-02-08 DEVICE — CATHETER 93092 DISTAL ANTIMICROBIAL
Type: IMPLANTABLE DEVICE | Site: CRANIAL | Status: FUNCTIONAL
Brand: ARES™

## 2024-02-08 DEVICE — CODMAN® CERTAS® PLUS PROGRAMMABLE VALVE INLINE VALVE ONLY WITH SIPHONGUARD®
Type: IMPLANTABLE DEVICE | Site: CRANIAL | Status: FUNCTIONAL
Brand: CODMAN CERTAS® PLUS

## 2024-02-08 RX ORDER — HYDRALAZINE HYDROCHLORIDE 20 MG/ML
10-20 INJECTION INTRAMUSCULAR; INTRAVENOUS EVERY 30 MIN PRN
Status: DISCONTINUED | OUTPATIENT
Start: 2024-02-08 | End: 2024-02-09 | Stop reason: HOSPADM

## 2024-02-08 RX ORDER — SODIUM CHLORIDE 9 MG/ML
INJECTION, SOLUTION INTRAVENOUS CONTINUOUS
Status: ACTIVE | OUTPATIENT
Start: 2024-02-08 | End: 2024-02-09

## 2024-02-08 RX ORDER — NEOSTIGMINE METHYLSULFATE 1 MG/ML
VIAL (ML) INJECTION PRN
Status: DISCONTINUED | OUTPATIENT
Start: 2024-02-08 | End: 2024-02-08

## 2024-02-08 RX ORDER — PROCHLORPERAZINE MALEATE 10 MG
10 TABLET ORAL EVERY 6 HOURS PRN
Status: DISCONTINUED | OUTPATIENT
Start: 2024-02-08 | End: 2024-02-09 | Stop reason: HOSPADM

## 2024-02-08 RX ORDER — GLYCOPYRROLATE 0.2 MG/ML
INJECTION, SOLUTION INTRAMUSCULAR; INTRAVENOUS PRN
Status: DISCONTINUED | OUTPATIENT
Start: 2024-02-08 | End: 2024-02-08

## 2024-02-08 RX ORDER — HYDROMORPHONE HCL IN WATER/PF 6 MG/30 ML
0.4 PATIENT CONTROLLED ANALGESIA SYRINGE INTRAVENOUS EVERY 5 MIN PRN
Status: DISCONTINUED | OUTPATIENT
Start: 2024-02-08 | End: 2024-02-08 | Stop reason: HOSPADM

## 2024-02-08 RX ORDER — OXYCODONE HYDROCHLORIDE 5 MG/1
5 TABLET ORAL EVERY 4 HOURS PRN
Status: DISCONTINUED | OUTPATIENT
Start: 2024-02-08 | End: 2024-02-09 | Stop reason: HOSPADM

## 2024-02-08 RX ORDER — SODIUM CHLORIDE, SODIUM LACTATE, POTASSIUM CHLORIDE, CALCIUM CHLORIDE 600; 310; 30; 20 MG/100ML; MG/100ML; MG/100ML; MG/100ML
INJECTION, SOLUTION INTRAVENOUS CONTINUOUS
Status: DISCONTINUED | OUTPATIENT
Start: 2024-02-08 | End: 2024-02-08 | Stop reason: HOSPADM

## 2024-02-08 RX ORDER — ONDANSETRON 2 MG/ML
INJECTION INTRAMUSCULAR; INTRAVENOUS PRN
Status: DISCONTINUED | OUTPATIENT
Start: 2024-02-08 | End: 2024-02-08

## 2024-02-08 RX ORDER — ACETAMINOPHEN 325 MG/1
650 TABLET ORAL EVERY 4 HOURS PRN
Status: DISCONTINUED | OUTPATIENT
Start: 2024-02-11 | End: 2024-02-09 | Stop reason: HOSPADM

## 2024-02-08 RX ORDER — HYDROMORPHONE HCL IN WATER/PF 6 MG/30 ML
0.4 PATIENT CONTROLLED ANALGESIA SYRINGE INTRAVENOUS
Status: COMPLETED | OUTPATIENT
Start: 2024-02-08 | End: 2024-02-08

## 2024-02-08 RX ORDER — ONDANSETRON 2 MG/ML
4 INJECTION INTRAMUSCULAR; INTRAVENOUS EVERY 30 MIN PRN
Status: DISCONTINUED | OUTPATIENT
Start: 2024-02-08 | End: 2024-02-08 | Stop reason: HOSPADM

## 2024-02-08 RX ORDER — PROPOFOL 10 MG/ML
INJECTION, EMULSION INTRAVENOUS PRN
Status: DISCONTINUED | OUTPATIENT
Start: 2024-02-08 | End: 2024-02-08

## 2024-02-08 RX ORDER — ACETAMINOPHEN 325 MG/1
975 TABLET ORAL EVERY 8 HOURS
Status: DISCONTINUED | OUTPATIENT
Start: 2024-02-08 | End: 2024-02-09 | Stop reason: HOSPADM

## 2024-02-08 RX ORDER — DEXAMETHASONE SODIUM PHOSPHATE 4 MG/ML
INJECTION, SOLUTION INTRA-ARTICULAR; INTRALESIONAL; INTRAMUSCULAR; INTRAVENOUS; SOFT TISSUE PRN
Status: DISCONTINUED | OUTPATIENT
Start: 2024-02-08 | End: 2024-02-08

## 2024-02-08 RX ORDER — LABETALOL HYDROCHLORIDE 5 MG/ML
10-40 INJECTION, SOLUTION INTRAVENOUS EVERY 10 MIN PRN
Status: DISCONTINUED | OUTPATIENT
Start: 2024-02-08 | End: 2024-02-09 | Stop reason: HOSPADM

## 2024-02-08 RX ORDER — LIDOCAINE 40 MG/G
CREAM TOPICAL
Status: DISCONTINUED | OUTPATIENT
Start: 2024-02-08 | End: 2024-02-08 | Stop reason: HOSPADM

## 2024-02-08 RX ORDER — AMOXICILLIN 250 MG
1 CAPSULE ORAL 2 TIMES DAILY
Status: DISCONTINUED | OUTPATIENT
Start: 2024-02-08 | End: 2024-02-09 | Stop reason: HOSPADM

## 2024-02-08 RX ORDER — LIDOCAINE 40 MG/G
CREAM TOPICAL
Status: DISCONTINUED | OUTPATIENT
Start: 2024-02-08 | End: 2024-02-09 | Stop reason: HOSPADM

## 2024-02-08 RX ORDER — BUPIVACAINE HYDROCHLORIDE AND EPINEPHRINE 2.5; 5 MG/ML; UG/ML
INJECTION, SOLUTION INFILTRATION; PERINEURAL PRN
Status: DISCONTINUED | OUTPATIENT
Start: 2024-02-08 | End: 2024-02-08 | Stop reason: HOSPADM

## 2024-02-08 RX ORDER — CEFAZOLIN SODIUM/WATER 2 G/20 ML
2 SYRINGE (ML) INTRAVENOUS EVERY 8 HOURS
Status: DISCONTINUED | OUTPATIENT
Start: 2024-02-08 | End: 2024-02-08 | Stop reason: ALTCHOICE

## 2024-02-08 RX ORDER — BISACODYL 10 MG
10 SUPPOSITORY, RECTAL RECTAL DAILY PRN
Status: DISCONTINUED | OUTPATIENT
Start: 2024-02-08 | End: 2024-02-09 | Stop reason: HOSPADM

## 2024-02-08 RX ORDER — FENTANYL CITRATE 50 UG/ML
25 INJECTION, SOLUTION INTRAMUSCULAR; INTRAVENOUS EVERY 5 MIN PRN
Status: DISCONTINUED | OUTPATIENT
Start: 2024-02-08 | End: 2024-02-08 | Stop reason: HOSPADM

## 2024-02-08 RX ORDER — HYDROMORPHONE HCL IN WATER/PF 6 MG/30 ML
0.2 PATIENT CONTROLLED ANALGESIA SYRINGE INTRAVENOUS EVERY 5 MIN PRN
Status: DISCONTINUED | OUTPATIENT
Start: 2024-02-08 | End: 2024-02-08 | Stop reason: HOSPADM

## 2024-02-08 RX ORDER — ONDANSETRON 2 MG/ML
4 INJECTION INTRAMUSCULAR; INTRAVENOUS EVERY 6 HOURS PRN
Status: DISCONTINUED | OUTPATIENT
Start: 2024-02-08 | End: 2024-02-09 | Stop reason: HOSPADM

## 2024-02-08 RX ORDER — ONDANSETRON 4 MG/1
4 TABLET, ORALLY DISINTEGRATING ORAL EVERY 6 HOURS PRN
Status: DISCONTINUED | OUTPATIENT
Start: 2024-02-08 | End: 2024-02-09 | Stop reason: HOSPADM

## 2024-02-08 RX ORDER — FENTANYL CITRATE 50 UG/ML
50 INJECTION, SOLUTION INTRAMUSCULAR; INTRAVENOUS EVERY 5 MIN PRN
Status: DISCONTINUED | OUTPATIENT
Start: 2024-02-08 | End: 2024-02-08 | Stop reason: HOSPADM

## 2024-02-08 RX ORDER — LIDOCAINE HYDROCHLORIDE 20 MG/ML
INJECTION, SOLUTION INFILTRATION; PERINEURAL PRN
Status: DISCONTINUED | OUTPATIENT
Start: 2024-02-08 | End: 2024-02-08

## 2024-02-08 RX ORDER — OXYCODONE HYDROCHLORIDE 10 MG/1
10 TABLET ORAL EVERY 4 HOURS PRN
Status: DISCONTINUED | OUTPATIENT
Start: 2024-02-08 | End: 2024-02-09 | Stop reason: HOSPADM

## 2024-02-08 RX ORDER — ONDANSETRON 4 MG/1
4 TABLET, ORALLY DISINTEGRATING ORAL EVERY 30 MIN PRN
Status: DISCONTINUED | OUTPATIENT
Start: 2024-02-08 | End: 2024-02-08 | Stop reason: HOSPADM

## 2024-02-08 RX ORDER — HYDROMORPHONE HCL IN WATER/PF 6 MG/30 ML
0.2 PATIENT CONTROLLED ANALGESIA SYRINGE INTRAVENOUS
Status: COMPLETED | OUTPATIENT
Start: 2024-02-08 | End: 2024-02-08

## 2024-02-08 RX ORDER — POLYETHYLENE GLYCOL 3350 17 G/17G
17 POWDER, FOR SOLUTION ORAL DAILY
Status: DISCONTINUED | OUTPATIENT
Start: 2024-02-09 | End: 2024-02-09 | Stop reason: HOSPADM

## 2024-02-08 RX ADMIN — Medication 150 MG: at 22:16

## 2024-02-08 RX ADMIN — HYDROXYZINE HYDROCHLORIDE 25 MG: 25 TABLET, FILM COATED ORAL at 20:35

## 2024-02-08 RX ADMIN — FENTANYL CITRATE 100 MCG: 50 INJECTION INTRAMUSCULAR; INTRAVENOUS at 09:25

## 2024-02-08 RX ADMIN — METOPROLOL TARTRATE 25 MG: 25 TABLET, FILM COATED ORAL at 20:35

## 2024-02-08 RX ADMIN — PRAZOSIN HYDROCHLORIDE 2 MG: 2 CAPSULE ORAL at 22:10

## 2024-02-08 RX ADMIN — SODIUM CHLORIDE: 9 INJECTION, SOLUTION INTRAVENOUS at 12:33

## 2024-02-08 RX ADMIN — Medication 50 MG: at 07:52

## 2024-02-08 RX ADMIN — LIDOCAINE HYDROCHLORIDE 100 MG: 20 INJECTION, SOLUTION INFILTRATION; PERINEURAL at 07:52

## 2024-02-08 RX ADMIN — DEXAMETHASONE SODIUM PHOSPHATE 4 MG: 4 INJECTION, SOLUTION INTRA-ARTICULAR; INTRALESIONAL; INTRAMUSCULAR; INTRAVENOUS; SOFT TISSUE at 07:53

## 2024-02-08 RX ADMIN — PROPOFOL 50 MG: 10 INJECTION, EMULSION INTRAVENOUS at 09:21

## 2024-02-08 RX ADMIN — HYDROMORPHONE HYDROCHLORIDE 0.4 MG: 0.2 INJECTION, SOLUTION INTRAMUSCULAR; INTRAVENOUS; SUBCUTANEOUS at 12:13

## 2024-02-08 RX ADMIN — CEFTRIAXONE SODIUM 2 G: 2 INJECTION, POWDER, FOR SOLUTION INTRAMUSCULAR; INTRAVENOUS at 04:13

## 2024-02-08 RX ADMIN — BUDESONIDE AND FORMOTEROL FUMARATE DIHYDRATE 2 PUFF: 80; 4.5 AEROSOL RESPIRATORY (INHALATION) at 20:34

## 2024-02-08 RX ADMIN — OXYCODONE HYDROCHLORIDE 10 MG: 10 TABLET ORAL at 16:58

## 2024-02-08 RX ADMIN — Medication 2 G: at 08:28

## 2024-02-08 RX ADMIN — SODIUM CHLORIDE, POTASSIUM CHLORIDE, SODIUM LACTATE AND CALCIUM CHLORIDE: 600; 310; 30; 20 INJECTION, SOLUTION INTRAVENOUS at 07:57

## 2024-02-08 RX ADMIN — HYDROMORPHONE HYDROCHLORIDE 0.4 MG: 0.2 INJECTION, SOLUTION INTRAMUSCULAR; INTRAVENOUS; SUBCUTANEOUS at 11:59

## 2024-02-08 RX ADMIN — ACETAMINOPHEN 975 MG: 325 TABLET, FILM COATED ORAL at 20:35

## 2024-02-08 RX ADMIN — PROPOFOL 200 MG: 10 INJECTION, EMULSION INTRAVENOUS at 07:52

## 2024-02-08 RX ADMIN — FENTANYL CITRATE 50 MCG: 50 INJECTION INTRAMUSCULAR; INTRAVENOUS at 09:12

## 2024-02-08 RX ADMIN — Medication 10 MG: at 09:19

## 2024-02-08 RX ADMIN — Medication 5 MG: at 10:08

## 2024-02-08 RX ADMIN — ACETAMINOPHEN 975 MG: 325 TABLET, FILM COATED ORAL at 12:53

## 2024-02-08 RX ADMIN — FENTANYL CITRATE 100 MCG: 50 INJECTION INTRAMUSCULAR; INTRAVENOUS at 10:24

## 2024-02-08 RX ADMIN — OXYCODONE HYDROCHLORIDE 10 MG: 10 TABLET ORAL at 12:53

## 2024-02-08 RX ADMIN — NEOSTIGMINE METHYLSULFATE 2.5 MG: 1 INJECTION, SOLUTION INTRAVENOUS at 10:27

## 2024-02-08 RX ADMIN — MIDAZOLAM 2 MG: 1 INJECTION INTRAMUSCULAR; INTRAVENOUS at 07:37

## 2024-02-08 RX ADMIN — GLYCOPYRROLATE 0.2 MCG: 0.2 INJECTION, SOLUTION INTRAMUSCULAR; INTRAVENOUS at 10:32

## 2024-02-08 RX ADMIN — HYDROMORPHONE HYDROCHLORIDE 0.4 MG: 0.2 INJECTION, SOLUTION INTRAMUSCULAR; INTRAVENOUS; SUBCUTANEOUS at 12:33

## 2024-02-08 RX ADMIN — GLYCOPYRROLATE 0.2 MCG: 0.2 INJECTION, SOLUTION INTRAMUSCULAR; INTRAVENOUS at 10:27

## 2024-02-08 RX ADMIN — Medication 20 MG: at 09:02

## 2024-02-08 RX ADMIN — PROPOFOL 50 MG: 10 INJECTION, EMULSION INTRAVENOUS at 09:30

## 2024-02-08 RX ADMIN — Medication 20 MG: at 08:35

## 2024-02-08 RX ADMIN — HYDROMORPHONE HYDROCHLORIDE 0.2 MG: 0.2 INJECTION, SOLUTION INTRAMUSCULAR; INTRAVENOUS; SUBCUTANEOUS at 15:37

## 2024-02-08 RX ADMIN — Medication 10 MG: at 09:52

## 2024-02-08 RX ADMIN — CEFTRIAXONE SODIUM 2 G: 2 INJECTION, POWDER, FOR SOLUTION INTRAMUSCULAR; INTRAVENOUS at 15:36

## 2024-02-08 RX ADMIN — HYDROMORPHONE HYDROCHLORIDE 0.4 MG: 0.2 INJECTION, SOLUTION INTRAMUSCULAR; INTRAVENOUS; SUBCUTANEOUS at 22:10

## 2024-02-08 RX ADMIN — CYCLOBENZAPRINE 10 MG: 10 TABLET, FILM COATED ORAL at 18:28

## 2024-02-08 RX ADMIN — ONDANSETRON 4 MG: 2 INJECTION INTRAMUSCULAR; INTRAVENOUS at 10:20

## 2024-02-08 RX ADMIN — PROPOFOL 150 MCG/KG/MIN: 10 INJECTION, EMULSION INTRAVENOUS at 07:55

## 2024-02-08 RX ADMIN — NEOSTIGMINE METHYLSULFATE 2.5 MG: 1 INJECTION, SOLUTION INTRAVENOUS at 10:32

## 2024-02-08 RX ADMIN — FENTANYL CITRATE 100 MCG: 50 INJECTION INTRAMUSCULAR; INTRAVENOUS at 07:52

## 2024-02-08 RX ADMIN — ONDANSETRON 4 MG: 2 INJECTION INTRAMUSCULAR; INTRAVENOUS at 12:00

## 2024-02-08 RX ADMIN — OXYCODONE HYDROCHLORIDE 10 MG: 10 TABLET ORAL at 20:35

## 2024-02-08 RX ADMIN — SERTRALINE HYDROCHLORIDE 75 MG: 50 TABLET ORAL at 22:10

## 2024-02-08 RX ADMIN — HYDROXYZINE HYDROCHLORIDE 25 MG: 25 TABLET, FILM COATED ORAL at 16:44

## 2024-02-08 RX ADMIN — SENNOSIDES AND DOCUSATE SODIUM 1 TABLET: 8.6; 5 TABLET ORAL at 20:35

## 2024-02-08 ASSESSMENT — ACTIVITIES OF DAILY LIVING (ADL)
ADLS_ACUITY_SCORE: 26

## 2024-02-08 NOTE — OP NOTE
PATIENT NAME: Leigh Sloan   PATIENT MRN: [id number]   PATIENT ACCOUNT: [id number]  PATIENT YOB: 2000     NEUROSURGERY OPERATIVE REPORT     DATE OF SURGERY: 02/08/24      PREOPERATIVE DIAGNOSIS:  1. IIH (idiopathic intracranial hypertension)  2. Infection of ventriculoperitoneal shunt placed at [facility] 2020     POSTOPERATIVE DIAGNOSIS:  1. IIH (idiopathic intracranial hypertension)  2. Infection of ventriculoperitoneal shunt placed at OSH 2020     PROCEDURES PERFORMED:  1. Placement of right frontal ventriculoperitoneal shunt with Codman Certas Plus Valve with SiphonGuard (set to 4)  2. Use of frameless stereotactic navigation  3. General Surgery laparoscopic access for   4. Removal of lumbar drain     STAFF SURGEON:   Calvin Cabrera MD    CO-SURGEON:  Abraham Zepeda MD      RESIDENT SURGEONS:   Tor Jerez MD     ANESTHESIA: General endotracheal anesthesia     ESTIMATED BLOOD LOSS: 10 mL     IMPLANTS:   Codman Certas Plus  shunt valve with SiphonGuard (set to 4)  Medtronic KIMBERLY ventricular and peritoneal shunt catheters    EXPLANTS:   Lumbar drain, including associated Craig drain     DRAINS: None.     FINDINGS:    Placement of right frontal  shunt--Codman Certas Plus with SIPHONGUARD, set to 4. Skin closure with Monocryl at abdominal and right frontal sites, and nylon at right retroauricular incision.     COMPLICATIONS: None.     INDICATIONS:   Leigh Sloan is a 23 year old female with history of idiopathic intracranial hypertension who had undergone  shunt placement in 2020 and subsequent revision on 1/22/2024.  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 She was treated with ceftriaxone per Infectious Disease recommendation until she was cleared for  shunt replacement.  The risks, benefits, and alternatives to  shunt  replacement were discussed with the patient, and she provided written consent for the above operation.     DESCRIPTION OF PROCEDURE:  The patient was brought to the operating room and intubated by Anesthesia.  She was positioned supine on standard operating table with arms at sides and head turned to the left (contralateral to the side of planned  shunt insertion site) on a Mora horseshoe headrest.  The bed was turned with head 180 degrees away from Anesthesia.  Kocher's point on the right side was marked.  Stereotactic registration of the head was performed with excellent accuracy, and a trajectory from Kocher's point to the right foramen of Monro was selected.  This entry point and a C-shaped right frontal incision were marked and shaved.  A 2.5 cm incision approximately 3 finger breadths below the right costal margin was identified and marked.  The right scalp, neck, chest, and abdomen were widely prepped and draped in standard fashion.  A total of 10 mL of 0.25% bupivacaine with 1:200,000 epinephrine was injected subcutaneously at the site of planned incisions.  A time-out was conducted.    #10 blade was used to incise through epidermis and dermis.  Monopolar cautery was used to dissect through the galea, and a cutaneous flap was reflected and held in place with a vicryl stitch.  A subgaleal pocket, overlying the pericranium, was created with blunt dissection along the right superior temporal line.    Concurrently, a 2.5 cm horizontal incision was made with #10 blade through the epidermis and dermis at the abdominal right upper quadrant.    Using the provided shunt tunneler and sheath, we tunneled from the abdominal incision to the right frontal cranial incision.  The most superior 2 cm of the right retroauricular incision was opened with Metzenbaum scissors, acting as a skip incision to allow for smoother tunneling between the right frontal cranial and right upper quadrant abdomen sites.  Tunneling  sheaths were left behind and used to pass the peritoneal shunt tubing from the right frontal cranial incision to the abdominal right upper quadrant incision.  The peritoneal shunt tubing, which had already been primed with saline and attached to preprogrammed Certas valve with 2-0 silk tie, was passed through the tunneled sheaths, and the sheaths were then removed over the peritoneal tubing.  The shunt valve was settled into the subgaleal pocket over the right superior temporal line.      Stereotactic localization was used to confirm the ideal location of ventricular catheter entry at Kocher's point, and pericranium was cleared over this area with monopolar cautery.  A 5 mm cutting virginia was used to create a small virginia hole at this site, and hemostasis was achieved with bone wax and bipolar cautery applied to the periosteal dura.  #11 blade was used to create a cruciate opening in the dura, and the dural leaflets were coagulated with bipolar cautery.  A pial opening was created with bipolar cautery.    Using stereotactic guidance, a new ventricular catheter was then passed along the preplanned trajectory to a depth of approximately 6.5 cm.  Brisk egress of clear CSF was noted upon removal of the stereotaxy stylet.  The ventricular catheter was cut to size and attached to the proximal port of the shunt valve; this connection point was secured with a 2-0 silk tie.  We then noted spontaneous flow of fluid from the distal end of the peritoneal tubing.    General Surgery then performed laparoscopic placement of the distal  shunt tubing into the peritoneum and closure of abdominal incisions.  This portion of the procedure is dictated separately.    Final hemostasis was achieved, and cranial incisions were irrigated with antibiotic saline.     Thereafter, we closed in layers with inverted, interrupted 2-0 vicryl for the galea at both cranial sites.  At the right frontal cranial incision, skin was closed with simple  running 3-0 Monocryl; at the right retroauricular incision, skin was closed with simple running 3-0 nylon.  Dermabond was applied to the abdominal incisions, and Bacitracin and sterile island dressings were applied to the cranial incisions. The patient was extubated and returned to the PACU without incident. At the end of the procedure, sponge and needle counts were correct. Estimated blood loss totaled 10 ml.     Dr. Cabrera was present for the critical portions of the procedure and immediately available for the remainder.     Operative note prepared by Tor Jerez MD, Neurosurgery, PGY-5.    I was present during the key portions of the operation and I was immediately available for the entire procedure.    Calvin Cabrera MD PhD

## 2024-02-08 NOTE — ANESTHESIA CARE TRANSFER NOTE
Patient: Leigh Sloan    Procedure: Procedure(s):  Stealth assisted RIGHT Implant Ventriculoperitoneal Shunt and Lumbar drain removal  Laparoscopic Assisted Implant Ventriculoperitoneal Shunt       Diagnosis: Infection of ventriculoperitoneal shunt, initial encounter (H24) [T85.730A]  IIH (idiopathic intracranial hypertension) [G93.2]  Diagnosis Additional Information: No value filed.    Anesthesia Type:   General     Note:    Oropharynx: oropharynx clear of all foreign objects and spontaneously breathing  Level of Consciousness: awake and drowsy  Oxygen Supplementation: face mask  Level of Supplemental Oxygen (L/min / FiO2): 4  Independent Airway: airway patency satisfactory and stable  Dentition: dentition unchanged  Vital Signs Stable: post-procedure vital signs reviewed and stable  Report to RN Given: handoff report given  Patient transferred to: PACU    Handoff Report: Identifed the Patient, Identified the Reponsible Provider, Reviewed the pertinent medical history, Discussed the surgical course, Reviewed Intra-OP anesthesia mangement and issues during anesthesia, Set expectations for post-procedure period and Allowed opportunity for questions and acknowledgement of understanding      Vitals:  Vitals Value Taken Time   /80 02/08/24 1106   Temp     Pulse 112 02/08/24 1110   Resp 8 02/08/24 1110   SpO2 100 % 02/08/24 1110   Vitals shown include unfiled device data.    Electronically Signed By: Jakob Vang MD  February 8, 2024  11:12 AM

## 2024-02-08 NOTE — OR NURSING
Patient arrives to the PACU anxious, inconsolable, and stating that she is unable to pee despite use of lynette-wick and bedpan. Bladder scanned the patient for 850 of urine. Per anesthesia patient to be straight cathed. Catheter placed with 1400 of urine removed. Neurosurgery team at patient bedside also made aware.     Per Neurosurgery at bedside. Patient can go to CT, followed by X-ray, followed by room to 6A.

## 2024-02-08 NOTE — PLAN OF CARE
Status: She is now 1/30 s/p removal of  shunt, 1/31 s/p lumbar drain placement.  Vitals: VSS   Neuros: Intact  IV: PICC-HL  Diet: Regular, strict I&Os. NPO at midnight   Bowel status: LBM 2/6  : voiding spontaneously  Skin: LD dressing CDI replaced this afternoon. Right head incision ROSIE   Pain: Oxy, flexeril, and tylenol given for headache/back pain   Activity: SBA, GB  Plan: Preop CHG wipes and shampoo done. CT @0400. Drain 10ml/hr from LD. OR for  shunt reimplantation 2/8 @ 1108. Continue to monitor and follow POC.

## 2024-02-08 NOTE — PROGRESS NOTES
"[facility], [place]  Neurosurgery Progress Note:  02/08/2024      Interval History: NAEO. Tolerating lumbar drainage without signs of leak.     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 She is now POD-10 s/p removal of  shunt, PPD-11 s/p lumbar drain placement.    Clinically Significant Risk Factors                         # Severe Obesity: Estimated body mass index is 42.26 kg/m  as calculated from the following:    Height as of this encounter: 1.651 m (5' 5\").    Weight as of this encounter: 115.2 kg (253 lb 15.5 oz).      # Asthma: noted on problem list        Pre-operative anticoagulation/antiplatelet: none    Plan:  OR Today for  shunt placement  Serial neuro exams  Ceftriaxone - 2 week course  NPO at midnight  SQH Held  -----------------------------------  Donnie White MD on 2/4/2024  PGY3 Neurosurgery     Please contact neurosurgery resident on call with questions.    Dial * * *[phone], enter [phone] when prompted.   -----------------------------------    Gen: Sleeping comfortably in bed  Wound: incision clean dry intact, appropriately healing; no sign of leak at lumbar drain site  Neurologic:  Alert & Oriented to person, place, time, and situation  Follows commands briskly  Speech fluent, spontaneous. No aphasia or dysarthria.  No gaze preference. No apparent hemineglect.  PERRL, EOMI but painful  Face symmetric with sensation intact to light touch  Palate elevates symmetrically, uvula midline, tongue protrudes midline  Trapezii muscles 5/5 bilaterally  No pronator drift     Del Tr Bi WE WF Gr   R 5 5 5 5 5 5   L 5 5 5 5 5 5    HF KE KF DF PF EHL   R 5 5 5 5 5 5   L 5 5 5 5 5 5     Reflexes 2+ throughout    Sensation intact and symmetric to light touch throughout    Objective:   Temp:  [97.7  F (36.5  C)-98.4  F (36.9  C)] 97.8  F (36.6  C)  Pulse:  [] 106  Resp:  [13-20] " 18  BP: (115-136)/(62-89) 115/67  SpO2:  [97 %-100 %] 98 %  I/O last 3 completed shifts:  In: -   Out: 2950 [Urine:2800; Drains:150]        LABS:  Recent Labs   Lab 02/08/24  0635 02/08/24  0619 02/07/24  0651 02/06/24  0721   NA  --  142 142 139   POTASSIUM  --  4.0 3.9 3.9   CHLORIDE  --  106 107 104   CO2  --  22 23 22   ANIONGAP  --  14 12 13   GLC 84 93 92 90   BUN  --  14.5 12.0 11.1   CR  --  0.72 0.59 0.56   KEV  --  9.3 9.2 9.3       Recent Labs   Lab 02/08/24 0619   WBC 8.0   RBC 4.07   HGB 10.9*   HCT 33.2*   MCV 82   MCH 26.8   MCHC 32.8   RDW 14.1          IMAGING:  Recent Results (from the past 24 hour(s))   CT Head w/o Contrast    Narrative    EXAM: CT HEAD W/O CONTRAST  2/8/2024 3:39 AM     HISTORY:  preop planning       COMPARISON:  CT 2/2/2024    TECHNIQUE: Using multidetector thin collimation helical acquisition  technique, axial, coronal and sagittal CT images from the skull base  to the vertex were obtained without intravenous contrast.   (topogram) image(s) also obtained and reviewed.    FINDINGS:  No acute intracranial hemorrhage, mass effect, or midline shift. No  acute loss of gray-white matter differentiation in the cerebral  hemispheres. Ventricles are proportionate to the cerebral sulci. Clear  basal cisterns.    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 The visualized portions of the  paranasal sinuses and mastoid air cells are clear. Grossly normal  orbits.       Impression    IMPRESSION: No acute intracranial pathology.     I have personally reviewed the examination and initial interpretation  and I agree with the findings.    VINCENT WILLARD MD         SYSTEM ID:  [id number]   CT Head w/o Contrast    Narrative    EXAM: CT HEAD W/O CONTRAST  2/8/2024 1:36 PM     HISTORY: post  shunt       COMPARISON: Head CT 2/8/2024 at 03:33    TECHNIQUE: Using multidetector thin collimation helical acquisition  technique, axial, coronal and sagittal CT images from the skull base  to the  vertex were obtained without intravenous contrast.   (topogram) image(s) also obtained and reviewed.    FINDINGS: Interval placement of right frontal approach ventriculostomy  catheter terminating within the right lateral ventricle near the  foramen of Hernandez. Punctate pneumocephalus along the anterior right  frontal convexity. No hemorrhage, mass effect or midline shift.  Gray-white matter differentiation is preserved. Basal cisterns are  clear. Ventricles report printed to the cerebral sulci.     Navid hole defect involving the right frontal bone. Clear paranasal  sinuses, mastoid air cells. Nonfocal orbits. Small volume of  subcutaneous emphysema involving the right scalp soft tissues.      Impression    IMPRESSION:   1. Interval placement of right frontal approach  ventriculostomy  catheter with tip terminating at the right lateral ventricle. No  hydrocephalus.  2. No acute intracranial abnormality.    I have personally reviewed the examination and initial interpretation  and I agree with the findings.    FELICIA JACKSON MD         SYSTEM ID:  [id number]   XR Shunt Malfunction Surgery Survey    Narrative    EXAM: Radiographic shunt malfunction survey 2/8/2024 1:48 PM     HISTORY: 23 years Female s/p VPS.    COMPARISON: Head CT 2/8/2024.    TECHNIQUE: Frontal and lateral radiographic images of the course of a  ventriculoperitoneal shunt were obtained and reviewed.    FINDINGS: There is a right frontal approach ventriculoperitoneal shunt  in the skull that appears intact without acute bend or apparent  discontinuity. The shunt in the neck appears unremarkable. In the  thorax, the shunt appears unremarkable. In the abdomen, the the shunt  appears unremarkable. The tip of the catheter projects over the right  lower abdominal quadrant.    Cardiac silhouette appears mildly enlarged. Pulmonary vasculature  appears normal. The visualized lung fields appear clear. Bowel gas  pattern appears nonobstructive. No acute  osseous abnormality.  Unremarkable soft tissues.      Impression    IMPRESSION: The shunt appears continuous throughout its course in the  head, neck, chest, and abdomen.    I have personally reviewed the examination and initial interpretation  and I agree with the findings.    FELICIA JACKSON MD         SYSTEM ID:  [id number]

## 2024-02-08 NOTE — PROGRESS NOTES
"[facility], [place]  Neurosurgery Progress Note:  02/07/2024      Interval History: NAEO. Tolerating lumbar drainage without signs of leak.     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 She is now POD-9 s/p removal of  shunt, PPD-10 s/p lumbar drain placement.    Clinically Significant Risk Factors                         # Severe Obesity: Estimated body mass index is 42.26 kg/m  as calculated from the following:    Height as of this encounter: 1.651 m (5' 5\").    Weight as of this encounter: 115.2 kg (253 lb 15.5 oz).      # Asthma: noted on problem list        Pre-operative anticoagulation/antiplatelet: none    Plan:  Serial neuro exams  Continue lumbar drainage 10/hr  ID consult, appreciate assistance  Ceftriaxone - 2 week course  PICC placed  Follow repeat blood cultures  Shunt replacement planned for 2/8 with general surgery  Diamox discontinued for low blood pressure, NO ALLERGY TO DIAMOX  HOB > 45 degrees  Pain control  Advance diet as tolerated  Bowel regimen  PRN antiemetics  IVF until taking adequate PO  PT/OT  SCDs for DVT proph, SQH    -----------------------------------  Donnie White MD on 2/4/2024  PGY3 Neurosurgery     Please contact neurosurgery resident on call with questions.    Dial * * [phone], enter [phone] when prompted.   -----------------------------------    Gen: Sleeping comfortably in bed  Wound: incision clean dry intact, appropriately healing; no sign of leak at lumbar drain site  Neurologic:  Alert & Oriented to person, place, time, and situation  Follows commands briskly  Speech fluent, spontaneous. No aphasia or dysarthria.  No gaze preference. No apparent hemineglect.  PERRL, EOMI but painful  Face symmetric with sensation intact to light touch  Palate elevates symmetrically, uvula midline, tongue protrudes midline  Trapezii muscles 5/5 bilaterally  No " pronator drift     Del Tr Bi WE WF Gr   R 5 5 5 5 5 5   L 5 5 5 5 5 5    HF KE KF DF PF EHL   R 5 5 5 5 5 5   L 5 5 5 5 5 5     Reflexes 2+ throughout    Sensation intact and symmetric to light touch throughout    Objective:   Temp:  [98.1  F (36.7  C)-98.9  F (37.2  C)] 98.2  F (36.8  C)  Pulse:  [] 112  Resp:  [16] 16  BP: ()/(51-89) 132/69  SpO2:  [97 %-99 %] 99 %  I/O last 3 completed shifts:  In: 490 [P.O.:490]  Out: 995 [Urine:775; Drains:220]        LABS:  Recent Labs   Lab 02/07/24  0651 02/06/24  0721 02/05/24  0637    139 137   POTASSIUM 3.9 3.9 3.8   CHLORIDE 107 104 107   CO2 23 22 19*   ANIONGAP 12 13 11   GLC 92 90 90   BUN 12.0 11.1 15.4   CR 0.59 0.56 0.63   KEV 9.2 9.3 8.9       Recent Labs   Lab 02/07/24  0651   WBC 7.2   RBC 3.95   HGB 10.8*   HCT 32.5*   MCV 82   MCH 27.3   MCHC 33.2   RDW 13.9          IMAGING:  No results found for this or any previous visit (from the past 24 hour(s)).

## 2024-02-08 NOTE — ANESTHESIA PROCEDURE NOTES
Airway       Patient location during procedure: OR       Procedure Start/Stop Times: 2/8/2024 7:55 AM  Staff -        Anesthesiologist:  Carlos Medley MD       Resident/Fellow: Jakob Vang MD       Performed By: with residents and resident       Procedure performed by resident/fellow/CRNA in presence of a teaching physician.  Indications and Patient Condition       Indications for airway management: lynette-procedural       Induction type:intravenous       Mask difficulty assessment: 0 - not attempted    Final Airway Details       Final airway type: endotracheal airway       Successful airway: ETT - single  Endotracheal Airway Details        ETT size (mm): 7.0       Cuffed: yes       Successful intubation technique: direct laryngoscopy       DL Blade Type: MAC 4       Grade View of Cords: 1       Adjucts: stylet       Position: Right       Measured from: lips       Secured at (cm): 22       Bite block used: Soft    Post intubation assessment        Number of attempts at approach: 1       Number of other approaches attempted: 0       Secured with: pink tape       Ease of procedure: easy       Dentition: Intact    Medication(s) Administered   Medication Administration Time: 2/8/2024 7:55 AM

## 2024-02-08 NOTE — PLAN OF CARE
Status:  POD-10 s/p removal of  shunt, POD-9 s/p lumbar drain placement. To OR this AM for  shunt insertion.  Vitals: VSS on RA ex tachycardic up to 120, per patient this is baseline.   Neuros: Intact ex mild photophobia. Anxious. Denies N/T this shift. Wears glasses.  IV: L DL PICC HL  Diet: NPO since 0000 for OR this AM. Strict I&Os.  Bowel status:  BS+, no BM This shift. LBM 2/7.  : voiding spontaneously  Skin: LD dressing, CDI. Right head incision, ROSIE.   Pain: mild pain, declines any pain medication.  Activity: SBA/GB.  Plan: Preop scrub in AM completed. CT @0400 completed. Drain 10ml/hr from LD. OR for  shunt reimplantation this AM @ 0730. Continue to monitor and follow POC.

## 2024-02-08 NOTE — BRIEF OP NOTE
[facility]    Brief Operative Note    Pre-operative diagnosis: Infection of ventriculoperitoneal shunt, initial encounter (H24) [T85.730A]  IIH (idiopathic intracranial hypertension) [G93.2]  Post-operative diagnosis Same as pre-operative diagnosis    Procedure: Stealth assisted RIGHT Implant Ventriculoperitoneal Shunt and Lumbar drain removal, Right - Head  possible Laparoscopic Assisted Implant Ventriculoperitoneal Shunt, N/A - Abdomen    Surgeon: Surgeon(s) and Role:  Panel 1:     * Calvin Cabrera MD - Primary     * Tor Jerez MD - Resident - Assisting  Panel 2:     * Abraham Zepeda MD - Primary  Anesthesia: General   Estimated Blood Loss: 10 mL    Drains: None  Specimens: * No specimens in log *  Findings:   Placement of right frontal  shunt--Codman Certas Plus with SIPHONGUARD, set to 4. Skin closure with Monocryl and nylon. .  Complications: None.  Implants:   Implant Name Type Inv. Item Serial No.  Lot No. LRB No. Used Action   SHUNT CATH PERITONEAL ANTIBIOTIC-IMPREG KIMBERLY 122CM [id number] - [id number] Shunt SHUNT CATH PERITONEAL ANTIBIOTIC-IMPREG KIMBERLY 122CM [id number]  [employer] [id number] N/A 1 Implanted   SHUNT CATH VENTRICULAR ANTIBIOTIC-IMPREG KIMBERLY 23CM [id number] - [id number] Shunt SHUNT CATH VENTRICULAR ANTIBIOTIC-IMPREG KIMBERLY 23CM [id number]  [employer] [id number] N/A 1 Implanted   SHUNT VALVE CERTAS PLUS W / SIPHONGUARD [id number] - [id number] Shunt SHUNT VALVE CERTAS PLUS W / SIPHONGUARD [id number]  [employer] [id number] N/A 1 Implanted           Tor Jerez M.D.  Neurosurgery Resident, PGY-5    Please contact neurosurgery resident on call with questions.    Dial * * [phone], enter [phone] when prompted.

## 2024-02-08 NOTE — ANESTHESIA POSTPROCEDURE EVALUATION
Patient: Leigh Sloan    Procedure: Procedure(s):  Stealth assisted RIGHT Implant Ventriculoperitoneal Shunt and Lumbar drain removal  Laparoscopic Assisted Implant Ventriculoperitoneal Shunt       Anesthesia Type:  General    Note:  Disposition: Inpatient   Postop Pain Control: Uneventful            Sign Out: Well controlled pain   PONV: No   Neuro/Psych: Uneventful            Sign Out: Acceptable/Baseline neuro status   Airway/Respiratory: Uneventful            Sign Out: Acceptable/Baseline resp. status   CV/Hemodynamics: Uneventful            Sign Out: Acceptable CV status; No obvious hypovolemia; No obvious fluid overload   Other NRE: NONE   DID A NON-ROUTINE EVENT OCCUR?            Last vitals:  Vitals Value Taken Time   /70 02/08/24 1245   Temp     Pulse 105 02/08/24 1258   Resp 5 02/08/24 1258   SpO2 98 % 02/08/24 1258   Vitals shown include unfiled device data.    Electronically Signed By: Tejas Bravo Junior, MD  February 8, 2024  1:00 PM

## 2024-02-08 NOTE — OR NURSING
"Patient transported with house transport and PACU RN to CT, followed by X-ray, followed by patient's returning bed. Report given to [address] RN [address]. /65 (Cuff Size: Adult Regular)   Pulse 107   Temp 98  F (36.7  C) (Oral)   Resp 18   Ht 1.651 m (5' 5\")   Wt 115.2 kg (253 lb 15.5 oz)   LMP 12/10/2023 (Within Days)   SpO2 97%   BMI 42.26 kg/m    "

## 2024-02-08 NOTE — PLAN OF CARE
Status: She is now 1/30 s/p removal of  shunt, 1/31 s/p lumbar drain placement. 2/8  shunt placement and LD removal   Vitals: VSS   Neuros: A/Ox4. 4/5 lowers   IV: PICC- DL, 100ml/hr NS   Diet: Regular  Bowel status: LBM 2/8, passing gas  : voiding spontaneously  Skin: LD site w/ gauze and transparent dressing. prior Right head incision ROSIE. Primapore covering new head incisions. Liquid bandage to 2 abd sites. Rash to lower back, lotion applied and atarax given.   Pain: Oxy, and dilaudid given for head and back pain.   Activity: SBA, GB, walker   Plan: Continue to monitor and follow POC  Arrived from: PACU   Belongings/meds: in room prior  2 RN Skin Assessment Completed by: Tiffanie     Non-intact findings documented (yes/no/NA): noted above

## 2024-02-08 NOTE — OP NOTE
"[facility]    Operative Note    Pre-operative diagnosis: Infection of ventriculoperitoneal shunt, initial encounter (H24) [T85.730A]  IIH (idiopathic intracranial hypertension) [G93.2]   Post-operative diagnosis * No post-op diagnosis entered *   Procedure: Procedure(s):  Stealth assisted RIGHT POSSIBLE LEFT Implant Ventriculoperitoneal Shunt  possible Laparoscopic Assisted Implant Ventriculoperitoneal Shunt; Laparoscopic Assisted  shunt placement;  shunt by neurosurgery    Surgeon: Surgeon(s) and Role:  Panel 1:     * Calvin Cabrera MD - Primary     * Tor Jerez MD - Resident - Assisting  Panel 2:     * Abraham Zepeda MD - Primary   Anesthesia: General    Estimated blood loss: None   Drains: None   Specimens: * No specimens in log *   Findings: None;    Complications: None.   Implants: None.       INDICATIONS FOR PROCEDURE  This patient requires a  shunt.  Neurosurgery staff has requested my assistance for the abdominal portion of the shunt procedure and laparoscopy assistance is indicated.      After understanding the risks and benefits of proceeding with a laparoscopic assisted  shunt placement, Pt agreed to an operation as outlined by the neurosurgery team.    Risks had previously been reviewed with family by the neurosurgery team.    OPERATIVE PROCEDURE: The patient was prepared in routine fashion and under the benefits of general anesthesia, a left upper quadrant Veress needle was inserted and the abdomen was insufflated with carbon dioxide to a maximum pressure of 15 mm Hg.     Findings as noted above.     A counter incision had been made by neurosurgery. I used an 18gauge needle to access the peritoneal cavity. 0.035\" wire was inserted and needle removed. The 10Fr peelaway sheath was passed over the wire. The  shunt tubing was then passed through the sheath under direct visualization and all of the tubing was left in the " peritoneal cavity at direction of neurosurgery.    I then removed the sheath and ensured that the tubing was not kinked.    Skin incisions were closed using 4-0 monocryl and skin glue.    All sponge and needle counts were correct x2 at the end of the case and the patient tolerated the procedure well.    I was present for all critical components of this case.      Abraham Zepeda MD    Surgery  [phone] (hospital )  [phone] (clinic nurses)

## 2024-02-09 ENCOUNTER — APPOINTMENT (OUTPATIENT)
Dept: PHYSICAL THERAPY | Facility: CLINIC | Age: 24
End: 2024-02-09
Payer: COMMERCIAL

## 2024-02-09 VITALS
DIASTOLIC BLOOD PRESSURE: 48 MMHG | TEMPERATURE: 98 F | HEIGHT: 65 IN | BODY MASS INDEX: 42.31 KG/M2 | RESPIRATION RATE: 16 BRPM | WEIGHT: 253.97 LBS | SYSTOLIC BLOOD PRESSURE: 91 MMHG | HEART RATE: 91 BPM | OXYGEN SATURATION: 96 %

## 2024-02-09 LAB
ANION GAP SERPL CALCULATED.3IONS-SCNC: 14 MMOL/L (ref 7–15)
BUN SERPL-MCNC: 10 MG/DL (ref 6–20)
CALCIUM SERPL-MCNC: 9.1 MG/DL (ref 8.6–10)
CHLORIDE SERPL-SCNC: 103 MMOL/L (ref 98–107)
CREAT SERPL-MCNC: 0.67 MG/DL (ref 0.51–0.95)
DEPRECATED HCO3 PLAS-SCNC: 22 MMOL/L (ref 22–29)
EGFRCR SERPLBLD CKD-EPI 2021: >90 ML/MIN/1.73M2
ERYTHROCYTE [DISTWIDTH] IN BLOOD BY AUTOMATED COUNT: 14 % (ref 10–15)
GLUCOSE SERPL-MCNC: 108 MG/DL (ref 70–99)
HCT VFR BLD AUTO: 31.4 % (ref 35–47)
HGB BLD-MCNC: 10.2 G/DL (ref 11.7–15.7)
MAGNESIUM SERPL-MCNC: 1.8 MG/DL (ref 1.7–2.3)
MCH RBC QN AUTO: 26.5 PG (ref 26.5–33)
MCHC RBC AUTO-ENTMCNC: 32.5 G/DL (ref 31.5–36.5)
MCV RBC AUTO: 82 FL (ref 78–100)
PHOSPHATE SERPL-MCNC: 4.1 MG/DL (ref 2.5–4.5)
PLATELET # BLD AUTO: 329 10E3/UL (ref 150–450)
POTASSIUM SERPL-SCNC: 3.9 MMOL/L (ref 3.4–5.3)
RBC # BLD AUTO: 3.85 10E6/UL (ref 3.8–5.2)
SODIUM SERPL-SCNC: 139 MMOL/L (ref 135–145)
WBC # BLD AUTO: 10.2 10E3/UL (ref 4–11)

## 2024-02-09 PROCEDURE — 97530 THERAPEUTIC ACTIVITIES: CPT | Mod: GP

## 2024-02-09 PROCEDURE — 250N000011 HC RX IP 250 OP 636

## 2024-02-09 PROCEDURE — 83735 ASSAY OF MAGNESIUM: CPT

## 2024-02-09 PROCEDURE — 250N000013 HC RX MED GY IP 250 OP 250 PS 637

## 2024-02-09 PROCEDURE — 84100 ASSAY OF PHOSPHORUS: CPT

## 2024-02-09 PROCEDURE — 80048 BASIC METABOLIC PNL TOTAL CA: CPT

## 2024-02-09 PROCEDURE — 97116 GAIT TRAINING THERAPY: CPT | Mod: GP

## 2024-02-09 PROCEDURE — 36592 COLLECT BLOOD FROM PICC: CPT

## 2024-02-09 PROCEDURE — 85027 COMPLETE CBC AUTOMATED: CPT

## 2024-02-09 RX ORDER — CEFTRIAXONE 2 G/1
2 INJECTION, POWDER, FOR SOLUTION INTRAMUSCULAR; INTRAVENOUS EVERY 12 HOURS
Qty: 160 ML | Refills: 0 | Status: ACTIVE | OUTPATIENT
Start: 2024-02-09 | End: 2024-02-13

## 2024-02-09 RX ORDER — POLYETHYLENE GLYCOL 3350 17 G/17G
17 POWDER, FOR SOLUTION ORAL DAILY
Qty: 510 G | Refills: 0 | Status: SHIPPED | OUTPATIENT
Start: 2024-02-09

## 2024-02-09 RX ORDER — AMOXICILLIN 250 MG
1 CAPSULE ORAL 2 TIMES DAILY
Qty: 30 TABLET | Refills: 0 | Status: SHIPPED | OUTPATIENT
Start: 2024-02-09

## 2024-02-09 RX ORDER — OXYCODONE HYDROCHLORIDE 5 MG/1
5 TABLET ORAL EVERY 4 HOURS PRN
Qty: 20 TABLET | Refills: 0 | Status: SHIPPED | OUTPATIENT
Start: 2024-02-09 | End: 2024-05-22

## 2024-02-09 RX ORDER — MELOXICAM 15 MG/1
15 TABLET ORAL AT BEDTIME
COMMUNITY
Start: 2024-02-16

## 2024-02-09 RX ADMIN — PANTOPRAZOLE SODIUM 40 MG: 40 TABLET, DELAYED RELEASE ORAL at 08:14

## 2024-02-09 RX ADMIN — CYCLOBENZAPRINE 10 MG: 10 TABLET, FILM COATED ORAL at 09:35

## 2024-02-09 RX ADMIN — ACETAMINOPHEN 975 MG: 325 TABLET, FILM COATED ORAL at 05:29

## 2024-02-09 RX ADMIN — OXYCODONE HYDROCHLORIDE 10 MG: 10 TABLET ORAL at 01:02

## 2024-02-09 RX ADMIN — POLYETHYLENE GLYCOL 3350 17 G: 17 POWDER, FOR SOLUTION ORAL at 08:14

## 2024-02-09 RX ADMIN — ACETAMINOPHEN 975 MG: 325 TABLET, FILM COATED ORAL at 12:48

## 2024-02-09 RX ADMIN — CEFTRIAXONE SODIUM 2 G: 2 INJECTION, POWDER, FOR SOLUTION INTRAMUSCULAR; INTRAVENOUS at 03:52

## 2024-02-09 RX ADMIN — HYDROXYZINE HYDROCHLORIDE 25 MG: 25 TABLET, FILM COATED ORAL at 03:52

## 2024-02-09 RX ADMIN — SENNOSIDES AND DOCUSATE SODIUM 1 TABLET: 8.6; 5 TABLET ORAL at 08:14

## 2024-02-09 RX ADMIN — CYCLOBENZAPRINE 10 MG: 10 TABLET, FILM COATED ORAL at 03:52

## 2024-02-09 RX ADMIN — OXYCODONE HYDROCHLORIDE 10 MG: 10 TABLET ORAL at 05:29

## 2024-02-09 RX ADMIN — BUDESONIDE AND FORMOTEROL FUMARATE DIHYDRATE 2 PUFF: 80; 4.5 AEROSOL RESPIRATORY (INHALATION) at 08:14

## 2024-02-09 RX ADMIN — Medication 10 ML: at 05:32

## 2024-02-09 RX ADMIN — METOPROLOL TARTRATE 25 MG: 25 TABLET, FILM COATED ORAL at 08:14

## 2024-02-09 RX ADMIN — OXYCODONE HYDROCHLORIDE 10 MG: 10 TABLET ORAL at 09:35

## 2024-02-09 ASSESSMENT — ACTIVITIES OF DAILY LIVING (ADL)
ADLS_ACUITY_SCORE: 26

## 2024-02-09 NOTE — PROGRESS NOTES
Text paged the team that patient stated that when she is tired her eyes drifts apart and has noticed this one time in the past.

## 2024-02-09 NOTE — PLAN OF CARE
Physical Therapy Discharge Summary    Reason for therapy discharge:    Discharged to home.    Progress towards therapy goal(s). See goals on Care Plan in [facility] electronic health record for goal details.  Goals partially met.  Barriers to achieving goals:   discharge from facility.    Therapy recommendation(s):    No further therapy is recommended.

## 2024-02-09 NOTE — PHARMACY
[facility]  Parenteral ANtibiotic Review at Departure from Acute Care Collaborative Note     Patient: Leigh Sloan  MRN: [id number]  Allergies: Diamox [acetazolamide], Sulfa antibiotics, Sulfamethoxazole-trimethoprim, and Trimethoprim    Current Location:  6A  OPAT to be provided by: [facility]       Line Type: PICC    Diagnosis/Indications: Cutibacterium acnes  shunt infection s/p removal on 1/29/2024, transient Strep sanguinis bacteremia  Organism(s): Cutibacterium acnes, Strep sanguinis  MRDO? No  Pending Cultures/Microbiological Tests: no      Inpatient ID involved in developing OPAT plan: Yes - discharge OPAT plan has no changes from ID provider, Dr. My Galicia, OPAT plan charted on 2/2/2024    Outpatient ID Follow-up: ID OPAT Clinic Referral Placed ([facility] Ph: [phone] and Fax: [phone]) - no appointment needed  Designated Provider: Dr. My Galicia if needed    Antimicrobial Regimen / Route Anticipated  Duration Start Date Stop /  Reassess Date   Ceftriaxone 2g every 12 hours/IV 14 days 1/31/24 2/13/24     Laboratory Tests and Monitoring Frequency:  (No laboratory monitoring warranted with intended outpatient ceftriaxone course < 5 days)      Imaging/Miscellaneous Monitoring: None    ID Pharmacist Interventions: None                          Ese Lin, PharmD, BCIDP  Pager: [phone]

## 2024-02-09 NOTE — DISCHARGE SUMMARY
[facility] Discharge Summary and Instructions    Leigh Sloan MRN# [id number]   Age: 23 year old YOB: 2000     Date of Admission:  1/29/2024  Date of Discharge::  2/9/2024  Admitting Physician:  Calvin Cabrear MD  Discharge Physician:  Calvin Cabrera MD          Admission Diagnoses:   Infection of ventriculoperitoneal shunt, initial encounter (H24) [T85.730A]          Discharge Diagnosis:     Infection of ventriculoperitoneal shunt, initial encounter (H24) [T85.730A]     Clinically Significant Risk Factors Present on Admission      # Asthma  # Non-alcoholic fatty liver disease         Procedures:   1/29/24 - 1. REMOVAL, SHUNT, VENTRICULOPERITONEAL, Right - Head      1/30/24 - lumbar drain placement under fluoroscopic guidance with neuroradiology    2/8/24  1. Placement of right frontal ventriculoperitoneal shunt with Codman Certas Plus Valve with SiphonGuard (set to 4)  2. Use of frameless stereotactic navigation  3. General Surgery laparoscopic access for   4. Removal of lumbar drain           Brief History of Illness:   Leigh Sloan is a 23 year-old female with history of idiopathic intracranial hypertension s/p right occipital ventriculoperitoneal shunt (Codman Hakim 120) placement in 2020 in [place]. She was found with recurrent papilledema after she moved to [place], so she was referred for shunt evaluation. We could not reprogram her Codman shunt after multiple attempts, so we reviewed options for management and the option of shunt exploration. We explored the shunt on 1/22/2024 and there was a proximal and valve failure but no clear signs of infection, so cultures were sent and the proximal and valves were exchanged for a Certas system set to 4.  Her symptoms improved but cultures from the shunt system placed in [place] grew P acnes, so I called the patient to come to the ED for shunt explantation. She now presents for explantation of her shunt system and  management of her idiopathic intracranial hypertension. Patient has elected to undergo above-mentioned procedure.           Hospital Course:   Patient underwent procedure for shunt explantation on 1/29/24. Following this, she was admitted to the neurosurgical floor for monitoring. Lumbar drain was placed on 1/30/24 for leaking of incision site.   ID was consulted given Cutibacterium  acnes growing from intraoperative cultures from shunt revision. Patient started on cefepime and vancomycin initially and able to narrowed down to Ceftriaxone for a 14 day treatment which she will continue with outpatient infusion. PICC line placed for this. CSF cultures were followed and  shunt was able to be placed on 2/8/24.  Following the procedure, CT head revealed expected post-operative changes and XR shunt series revealed proper positioning of catheter.  On post operative day 1 she was ambulating, voiding without a skaggs, eating a regular diet, pain was well controlled and therefore she was discharged home.          Discharge Medications:     Current Discharge Medication List        START taking these medications    Details   cefTRIAXone (ROCEPHIN) 2 GM vial Inject 2 g into the vein every 12 hours for 4 days  Qty: 160 mL, Refills: 0    Associated Diagnoses: S/P  shunt      !! polyethylene glycol (MIRALAX) 17 GM/Dose powder Take 17 g by mouth daily  Qty: 510 g, Refills: 0    Associated Diagnoses: S/P  shunt      senna-docusate (SENOKOT-S/PERICOLACE) 8.6-50 MG tablet Take 1 tablet by mouth 2 times daily  Qty: 30 tablet, Refills: 0    Associated Diagnoses: S/P  shunt       !! - Potential duplicate medications found. Please discuss with provider.        CONTINUE these medications which have CHANGED    Details   meloxicam (MOBIC) 15 MG tablet Take 1 tablet (15 mg) by mouth at bedtime      oxyCODONE (ROXICODONE) 5 MG tablet Take 1 tablet (5 mg) by mouth every 4 hours as needed for moderate pain  Qty: 20 tablet, Refills: 0     "Associated Diagnoses: S/P  shunt           CONTINUE these medications which have NOT CHANGED    Details   budesonide-formoterol (SYMBICORT) 80-4.5 MCG/ACT Inhaler Inhale 2 puffs into the lungs as needed      cetirizine (ZYRTEC) 10 MG tablet Take 10 mg by mouth as needed      cyclobenzaprine (FLEXERIL) 10 MG tablet Take 10 mg by mouth 3 times daily as needed      drospirenone-ethinyl estradiol (SAMMY) 3-0.02 MG tablet Take 1 tablet by mouth at bedtime      hydrOXYzine (VISTARIL) 25 MG capsule Take 25 mg by mouth as needed      meclizine (ANTIVERT) 25 MG tablet Take 12.5-25 mg by mouth 3 times daily as needed      metoprolol succinate ER (TOPROL XL) 50 MG 24 hr tablet Take 1 tablet by mouth at bedtime      omeprazole (PRILOSEC) 20 MG DR capsule Take 20 mg by mouth at bedtime      !! polyethylene glycol (MIRALAX) 17 GM/Dose powder Take 17 g by mouth daily  Qty: 510 g, Refills: 0    Associated Diagnoses: S/P ventriculoperitoneal shunt      prazosin (MINIPRESS) 2 MG capsule Take 1 capsule by mouth at bedtime      QUEtiapine Fumarate 150 MG TABS Take 1 tablet by mouth at bedtime      sertraline (ZOLOFT) 25 MG tablet Take 75 mg by mouth at bedtime       !! - Potential duplicate medications found. Please discuss with provider.                 Exam:   Physical Exam  BP 91/48 (BP Location: Right arm)   Pulse 91   Temp 98  F (36.7  C) (Oral)   Resp 16   Ht 1.651 m (5' 5\")   Wt 115.2 kg (253 lb 15.5 oz)   LMP 12/10/2023 (Within Days)   SpO2 96%   BMI 42.26 kg/m    General: Appears comfortable, NAD  Wound: incision clean dry intact  Neurologic:  Alert & Oriented to person, place, time, and situation  Follows commands briskly  Speech fluent, spontaneous. No aphasia or dysarthria.  No gaze preference. No apparent hemineglect.  PERRL, EOMI but painful  Face symmetric with sensation intact to light touch  Palate elevates symmetrically, uvula midline, tongue protrudes midline  Trapezii muscles 5/5 bilaterally  No pronator " drift       Del Tr Bi WE WF Gr   R 5 5 5 5 5 5   L 5 5 5 5 5 5     HF KE KF DF PF EHL   R 5 5 5 5 5 5   L 5 5 5 5 5 5      Reflexes 2+ throughout     Sensation intact and symmetric to light touch throughout         Discharge Instructions and Follow-Up:     Discharge diet: Regular   Discharge activity: You may advance activity as tolerated. No strenuous exercise or heay lifting greater than 10 lbs for 4 weeks or until seen and cleared in clinic.     Discharge follow-up: Follow-up with Neurosurgery [facility] in 2 weeks for wound check/post-hospital evaluation.     Wound care: Ok to shower,however no scrubbing of the wound and no soaking of the wound, meaning no bathtubs or swimming pools. Pat dry only. Leave wound open to air.  Patient to have wound checked 2 weeks following surgery.    Wound location: Scalp (absorbable), postauricular (nylon) and abdomen (absorbable sutures)  Closure technique: sutures  Dressing needs: None  Post-op care at follow-up: Keep dry and clean         Please call if you have:  1. increased pain, redness, drainage, swelling at your incision  2. fevers > 101.5 F degrees  3. with any questions or concerns.  You may reach the Neurosurgery clinic at [phone] during regular work hours. ER at [phone].    and ask for the Neurosurgery Resident on call at [phone], during off hours or weekends.         Discharge Disposition:     Discharged to home        Yesica Stevens PA-C  Neurosurgery Department  Pager: [phone]

## 2024-02-09 NOTE — PROGRESS NOTES
Patient is discharged to home and [facility] care will see patient today for IV infusion of antibiotics.  Patient will  the rest of her medications on her way out. Family will provide ride.  Patient knows the phone number to call for upcoming follow up appointments.  Double PICC line intact.

## 2024-02-09 NOTE — PLAN OF CARE
Goal Outcome Evaluation:      Plan of Care Reviewed With: patient    Overall Patient Progress: improvingOverall Patient Progress: improving    Outcome Evaluation: pain managed with PRN meds and scheduled      Status:  POD-10 s/p removal of  shunt, POD-9 s/p lumbar drain placement. S/p R  shunt insertion and LD removal on 2/8.  Vitals: VSS on RA ex tachycardic up to 120, per patient this is baseline. on continuous pulse ox.  Neuros:A&Ox4. mild photophobia. Intermittent dull sensation to L side of face. Wears glasses. Strengths: BUE 5/5, BLE 4/5.  IV: L DL PICC HL in between IV antibiotics.  Diet: regular diet. Strict I&Os.  Bowel status: BS+, no BM This shift. LBM 2/8, prior to surgery.  : voiding spontaneously  Skin: Old LD site with gauze and tegaderm, CDI. Right head incision, WNL. Anterior head incision, WNL. Abdominal lap sites x2 with liquid bandage, ROSIE. Rash to back, given PRN atarax x2. Abrasions to chest.  Pain: Incisional pain managed with scheduled meds and PRN flexeril x1, PRN oxycodone x3.  Activity: SBA/GB/walker.  Plan: Pain management. Continue to monitor and follow POC.

## 2024-02-09 NOTE — PROGRESS NOTES
Care Management Discharge Note    Discharge Date: 02/09/2024     Discharge Disposition: Home   Discharge Services:  Home Infusion  Discharge DME:  None    Discharge Transportation: family or friend will provide    Private pay costs discussed: Not applicable    Does the patient's insurance plan have a 3 day qualifying hospital stay waiver?  No    Education Provided on the Discharge Plan:  Yes  Persons Notified of Discharge Plans: Patient, provider, [facility]  Patient/Family in Agreement with the Plan:  Yes    Handoff Referral Completed: Yes    Additional Information:  Patient status reviewed, discussed in discharge rounds. Patient is medically ready for discharge home today. Patient will go with IV antibiotics.    Spoke with Camilo, [facility] liaison, and updated on discharge plan. Camilo will meet with patient to review education and delivery of medication and supplies.     Met with patient to discuss discharge planning, she is in agreement with plan.     Handoff complete.     [facility] - home IV antibiotics  Ph: [phone]  Fax: [phone]     Jennifer Batista RN, BSN  6A RN Care Coordinator  Ph: [phone]   Pager: [phone]

## 2024-02-09 NOTE — PROGRESS NOTES
Home Infusion  Ivette will be discharging today and going home on IV Ceftriaxone q12hrs.  She has not done home IV therapy before and needs initial teaching.  Met with patient at bedside to discuss discharge plans and provide teaching.  Provided information on [facility] services and instructed in IV Ceftriaxone administration via IV Push method with SAS flushing.   Had patient perform hands on with practice equipment and teaching sheets.  Provided information about supplies and supply delivery, storage of medication, checking of label, dosing times, plan for SNV and 24/7 availability of [facility] staff.      Patient verbalized understanding of information given, but states she is very tired and has a hard time thinking when tired.  She would like her aunt whom she lives with to also learn how to do infusions so she can assist.  Plan for delivery of med and supplies to patient's home address by 5pm today.  I RN will see at home approx 630-7pm tonight to do additional teaching with patient and her aunt.    Patient is ready for discharge from [facility] perspective.    NIGEL Momin  [facility] Nurse Liaison   [email or web address]  Cell: [phone] M-F  [facility] Main: [phone]

## 2024-02-11 LAB
BACTERIA CSF CULT: NO GROWTH
GRAM STAIN RESULT: NORMAL
GRAM STAIN RESULT: NORMAL

## 2024-02-13 LAB — BACTERIA CSF CULT: NORMAL

## 2024-02-14 ENCOUNTER — DOCUMENTATION ONLY (OUTPATIENT)
Dept: INFECTIOUS DISEASES | Facility: CLINIC | Age: 24
End: 2024-02-14
Payer: COMMERCIAL

## 2024-02-19 LAB
BACTERIA CSF CULT: NO GROWTH
BACTERIA SPEC CULT: NO GROWTH
BACTERIA SPEC CULT: NO GROWTH

## 2024-02-19 NOTE — PROGRESS NOTES
[facility]      OUTPATIENT PHYSICAL THERAPY EVALUATION  PLAN OF TREATMENT FOR OUTPATIENT REHABILITATION  (COMPLETE FOR INITIAL CLAIMS ONLY)  Patient's Last Name, First Name, M.I.  YOB: 2000  Leigh Sloan                        Provider's Name  [facility] Medical Record No.  [id number]                               Onset Date:  01/22/24   Start of Care Date:  01/22/24      Type:     _X_PT   ___OT   ___SLP Medical Diagnosis:  Shunt revision                        PT Diagnosis:  impaired mobility   Visits from SOC:  1   _________________________________________________________________________________  Plan of Treatment/Functional Goals    Planned Interventions: balance training, bed mobility training, gait training, home exercise program, neuromuscular re-education, patient/family education, postural re-education, ROM (range of motion), stair training, strengthening, stretching, transfer training, risk factor education, home program guidelines, progressive activity/exercise     Goals: See Physical Therapy Goals on Care Plan in [employer] electronic health record.    Therapy Frequency: Daily  Predicted Duration of Therapy Intervention: 01/30/24  _________________________________________________________________________________    I CERTIFY THE NEED FOR THESE SERVICES FURNISHED UNDER        THIS PLAN OF TREATMENT AND WHILE UNDER MY CARE .             Physician Signature               Date    X_____________________________________________________                  Certification date from: 01/22/24, Certification date to: 01/24/24    Referring Physician: Yesica Stevens PA-C            Initial Assessment        See Physical Therapy evaluation dated 01/22/24 in Epic electronic health record.

## 2024-02-20 LAB — BACTERIA CSF CULT: NORMAL

## 2024-02-21 ENCOUNTER — OFFICE VISIT (OUTPATIENT)
Dept: NEUROSURGERY | Facility: CLINIC | Age: 24
End: 2024-02-21
Payer: COMMERCIAL

## 2024-02-21 ENCOUNTER — ANCILLARY PROCEDURE (OUTPATIENT)
Dept: CT IMAGING | Facility: CLINIC | Age: 24
End: 2024-02-21
Attending: PHYSICIAN ASSISTANT
Payer: COMMERCIAL

## 2024-02-21 VITALS — SYSTOLIC BLOOD PRESSURE: 135 MMHG | OXYGEN SATURATION: 95 % | DIASTOLIC BLOOD PRESSURE: 81 MMHG | HEART RATE: 123 BPM

## 2024-02-21 DIAGNOSIS — Z98.2 S/P VP SHUNT: ICD-10-CM

## 2024-02-21 DIAGNOSIS — G93.2 IIH (IDIOPATHIC INTRACRANIAL HYPERTENSION): ICD-10-CM

## 2024-02-21 DIAGNOSIS — G93.2 IIH (IDIOPATHIC INTRACRANIAL HYPERTENSION): Primary | ICD-10-CM

## 2024-02-21 PROCEDURE — 99024 POSTOP FOLLOW-UP VISIT: CPT | Performed by: PHYSICIAN ASSISTANT

## 2024-02-21 PROCEDURE — 70450 CT HEAD/BRAIN W/O DYE: CPT | Mod: GC | Performed by: RADIOLOGY

## 2024-02-21 NOTE — PROGRESS NOTES
[facility]  Department of Neurosurgery  Center for Skull Base and Pituitary Surgery    Name: Leigh Sloan  MRN: [id number]  Age: 23 year old  : 2024      Chief Complaint:   Idiopathic intracranial hypertension s/p right occipital  shunt placement  (Hakim, placed in [place]) with recurrent papilledema and shunt malfunction s/p right occipital  shunt revision 2024 c/b positive intraoperative cultures s/p shunt removal 2024 and redo  shunt placement 2024 (Certas at 4), postoperative visit    History of Present Illness:   Leigh Sloan is a 23 year old female with a history of idiopathic intracranial hypertension who is seen today for a postoperative visit. She underwent  shunt placement in [place] in  after experiencing vision changes with papilledema on Ophthalmology exam. She was evaluated by Dr. 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 She underwent initial shunt revision last month with Dr. Cabrera but unfortunately intraoperative cultures were positive for P.acnes. She was treated with IV antibiotics and underwent shunt removal and then redo  shunt placement as above. She's here with her aunt today. She reports some headaches around her eyes and at the base of her neck. She has ongoing tinnitus bilaterally but sounds different than before. She had one episode of vomiting a couple of days ago, thinks that may be migraine related, this has not recurred and she denies new ongoing nausea. She has lightheadedness but no falls or trouble walking.     Review of Systems:   Pertinent items are noted in HPI or as in patient entered ROS below, remainder of complete ROS is negative.     Physical Exam:   /81   Pulse (!) 123   LMP 12/10/2023 (Within Days)   SpO2 95%   General: No acute distress.    Eyes: Conjunctivae are normal.  MSK:  Moves all extremities.  No obvious deformity.  Neuro: The patient is fully oriented. Speech is normal. Facial nerve function is normal, rated as a House Brackmann 1. Gait is normal  Psych: Normal mood and affect. Behavior is normal.    Incision: Her right frontal and right occipital incisions are well healed. Sutures removed from right occipital incision today without complication. Her lumbar drain stitch was removed as well. Her abdominal incisions are well healed without swelling, erythema, or drainage.     Procedure:   With the patient's verbal permission her right frontal Certas valve was interrogated, found to be set to 4. Rechecked x 3.    Imaging:  Will get head CT today, scheduled for this afternoon     Assessment:  Idiopathic intracranial hypertension s/p right occipital  shunt placement 2020 (Marcia, placed in [place]) with recurrent papilledema and shunt malfunction s/p right occipital  shunt revision 1/22/2024 c/b positive intraoperative cultures s/p shunt removal 1/29/2024 and redo  shunt placement 2/08/2024 (Certas at 4), postoperative visit    Plan:  Will obtain head CT given lightheadedness and headaches and will call her with results. I asked her to schedule a follow up appointment with NeuroOphthalmology within the next couple of months. I'll plan to see her back in 3 months if head CT appears stable/reassuring, sooner with new concerns.        Dolly Garcia PA-C  Department of Neurosurgery

## 2024-02-21 NOTE — NURSING NOTE
Chief Complaint   Patient presents with    RECHECK     Post op follow up  Sharp pain and deep breathing in certain situations per patient   /81   Pulse (!) 123   LMP 12/10/2023 (Within Days)   SpO2 95%     Tanesha Farooq CMA at 10:18 AM on 2/21/2024.

## 2024-02-21 NOTE — PATIENT INSTRUCTIONS
I will call you with head CT results, or communicate via [employer].    Please return in 3 months, sooner if you have any new concerns.

## 2024-02-27 LAB — BACTERIA CSF CULT: NO GROWTH

## 2024-03-05 LAB — BACTERIA CSF CULT: NO GROWTH

## 2024-03-18 LAB
ACID FAST STAIN (ARUP): NORMAL

## 2024-05-08 ENCOUNTER — TELEPHONE (OUTPATIENT)
Dept: NEUROSURGERY | Facility: CLINIC | Age: 24
End: 2024-05-08
Payer: COMMERCIAL

## 2024-05-08 NOTE — TELEPHONE ENCOUNTER
----- Message from Shahla Wong LPN sent at 5/8/2024  2:04 PM CDT -----    ----- Message -----  From: Dotty Garnica CMA  Sent: 5/8/2024  12:56 PM CDT  To: [facility]uro[facility]urg[facility]    Loar Love RN calling from [facility].regarding the above patient she would like a call back from Calvin Cabrera MD Team.Provider Mayela Schreiber  needs recommendations regarding ADHD medication. Patient recently had surgery and is wanting to know if patient can restart medication. Lora RN can be reached at [phone].  Thank you,  MEGGAN Bush  General Neurology

## 2024-05-08 NOTE — TELEPHONE ENCOUNTER
NIGEL Paulino and other clinic staff were not available when writer called mental health clinic. The answering service / call center person was able to read their internal message which provided more detail. Patient's mental health provider wants to know if there are any concerns about stimulant medications from a neurosurgical perspective. Patient took Concerta in 2019 for ADHD and wants to restart the medication.

## 2024-05-10 NOTE — TELEPHONE ENCOUNTER
Writer contacted pt's mental health clinic and informed them it was ok for pt to restart her Concerta per TWAN Blunt.

## 2024-05-22 ENCOUNTER — OFFICE VISIT (OUTPATIENT)
Dept: NEUROSURGERY | Facility: CLINIC | Age: 24
End: 2024-05-22
Attending: PHYSICIAN ASSISTANT
Payer: COMMERCIAL

## 2024-05-22 VITALS
HEIGHT: 65 IN | DIASTOLIC BLOOD PRESSURE: 80 MMHG | WEIGHT: 260 LBS | BODY MASS INDEX: 43.32 KG/M2 | HEART RATE: 105 BPM | OXYGEN SATURATION: 97 % | RESPIRATION RATE: 16 BRPM | SYSTOLIC BLOOD PRESSURE: 123 MMHG

## 2024-05-22 DIAGNOSIS — Z98.2 S/P VP SHUNT: ICD-10-CM

## 2024-05-22 DIAGNOSIS — G93.2 IIH (IDIOPATHIC INTRACRANIAL HYPERTENSION): Primary | ICD-10-CM

## 2024-05-22 PROCEDURE — 99213 OFFICE O/P EST LOW 20 MIN: CPT | Performed by: PHYSICIAN ASSISTANT

## 2024-05-22 RX ORDER — ALBUTEROL SULFATE 90 UG/1
AEROSOL, METERED RESPIRATORY (INHALATION)
COMMUNITY

## 2024-05-22 RX ORDER — INHALER, ASSIST DEVICES
SPACER (EA) MISCELLANEOUS SEE ADMIN INSTRUCTIONS
COMMUNITY
Start: 2024-02-10

## 2024-05-22 ASSESSMENT — PAIN SCALES - GENERAL: PAINLEVEL: MILD PAIN (2)

## 2024-05-22 NOTE — LETTER
"2024       RE: Leigh Sloan  [address] [facility], [place] [address]       Dear Colleague,    Thank you for referring your patient, Leigh Sloan, to the [facility] NEUROSURGERY CLINIC [place] at [facility]. Please see a copy of my visit note below.      [facility]  Department of Neurosurgery  Center for Skull Base and Pituitary Surgery    Name: Leigh Sloan  MRN: [id number]  Age: 24 year old  : 2024      Chief Complaint:   Idiopathic intracranial hypertension s/p right occipital  shunt placement  (Hakim, placed in [place]) with recurrent papilledema and shunt malfunction s/p right occipital  shunt revision 2024 c/b positive intraoperative cultures s/p shunt removal 2024 and redo  shunt placement 2024 (Certas at 4), follow up visit    History of Present Illness:   Leigh Sloan is a 24 year old female with a history of idiopathic intracranial hypertension who is seen today for follow up.   She underwent  shunt placement in [place] in  after experiencing vision changes with papilledema on Ophthalmology exam. She was evaluated by Dr. 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 She underwent initial shunt revision earlier this year with Dr. Cabrera but unfortunately intraoperative cultures were positive for P.acnes. She was treated with IV antibiotics and underwent shunt removal and then redo  shunt placement as above. Today she reports that she is feeling reasonably well. Her headaches are migrainous and she has not experienced the \"pressure\" type of head pain since shunt revision. She denies new vision changes, though has not had follow up with Ophthalmology yet; I again encouraged her to schedule this. She reports a \"bump\" over her shunt valve that she'd like " "looked at, and also a palpable lump over her right upper breast that she thinks may be shunt tubing; she had her PCP examine this area and they wanted Neurosurgery to give an opinion on need for breast ultrasound. She denies new nausea, vomiting, imbalance, or falls since our last visit in February.     Review of Systems:   Pertinent items are noted in HPI or as in patient entered ROS below, remainder of complete ROS is negative.     Physical Exam:   /80 (BP Location: Right arm, Patient Position: Sitting)   Pulse 105   Resp 16   Ht 1.651 m (5' 5\")   Wt 117.9 kg (260 lb)   SpO2 97%   BMI 43.27 kg/m     General: No acute distress.    Eyes: Conjunctivae are normal.  MSK: Moves all extremities.  No obvious deformity.  Neuro: The patient is fully oriented. Speech is normal. Extraocular movements are intact without nystagmus. Facial sensation is intact in V1, V2, V3 distributions. Facial nerve function is normal, rated as a House Brackmann 1. Gait is normal.   Psych: Normal mood and affect. Behavior is normal.    Incision: Over the proximal aspect of the shunt valve is a superficial, round wound which looks like the remnants of a pimple; it is open but not weeping or draining anything. Superficial, no exposure to the valve. No surrounding erythema or swelling.   Chest: Over the right chest wall, just above the right breast, is a superficial, moveable lump which is small (0.5cm, perhaps). I had a hard time tracing her shunt tubing to this area.     Procedure:  With patient's verbal permission her Certas Plus valve was interrogated, found to be set to 4. Rechecked x 2.    Imaging:  No new imaging to review today     Assessment:  Idiopathic intracranial hypertension s/p right occipital  shunt placement 2020 (Marcia, placed in [place]) with recurrent papilledema and shunt malfunction s/p right occipital  shunt revision 1/22/2024 c/b positive intraoperative cultures s/p shunt removal 1/29/2024 and redo  shunt " placement 2/08/2024 (Certas at 4), follow up visit  Superficial healing wound, right frontal scalp  Soft tissue swelling, right chest wall    Plan:  We reviewed follow up in 1 year without imaging, will see Leigh sooner with any new concerns. I asked her to schedule NeuroOphthalmology follow up in the next few months.   We discussed the importance of refraining from touching or picking at this area, as she admits she has a hard time at night and thinks she's messing with it during sleep. I asked her to apply bacitracin or aquaphor at night, covering as needed. She should return if this worsens.   I asked her to return to Primary Care for ultrasound to ensure this is not separate from her shunt tubing, as it's difficult to trace the tubing today. Would advise they rule out other etiologies for the lump.          Again, thank you for allowing me to participate in the care of your patient.      Sincerely,    Dolly Garcia PA-C

## 2024-05-22 NOTE — PATIENT INSTRUCTIONS
Return in 1 year, in person, for shunt follow up.  Please reach out sooner with any new concerns.  Take care!

## 2024-05-22 NOTE — PROGRESS NOTES
"  [facility]  Department of Neurosurgery  Center for Skull Base and Pituitary Surgery    Name: Leigh Sloan  MRN: [id number]  Age: 24 year old  : 2024      Chief Complaint:   Idiopathic intracranial hypertension s/p right occipital  shunt placement  (Hakim, placed in [place]) with recurrent papilledema and shunt malfunction s/p right occipital  shunt revision 2024 c/b positive intraoperative cultures s/p shunt removal 2024 and redo  shunt placement 2024 (Certas at 4), follow up visit    History of Present Illness:   Leigh Sloan is a 24 year old female with a history of idiopathic intracranial hypertension who is seen today for follow up.   She underwent  shunt placement in [place] in  after experiencing vision changes with papilledema on Ophthalmology exam. She was evaluated by Dr. 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 She underwent initial shunt revision earlier this year with Dr. Cabrera but unfortunately intraoperative cultures were positive for P.acnes. She was treated with IV antibiotics and underwent shunt removal and then redo  shunt placement as above. Today she reports that she is feeling reasonably well. Her headaches are migrainous and she has not experienced the \"pressure\" type of head pain since shunt revision. She denies new vision changes, though has not had follow up with Ophthalmology yet; I again encouraged her to schedule this. She reports a \"bump\" over her shunt valve that she'd like looked at, and also a palpable lump over her right upper breast that she thinks may be shunt tubing; she had her PCP examine this area and they wanted Neurosurgery to give an opinion on need for breast ultrasound. She denies new nausea, vomiting, imbalance, or falls since our last visit in February.     Review of Systems: " "  Pertinent items are noted in HPI or as in patient entered ROS below, remainder of complete ROS is negative.     Physical Exam:   /80 (BP Location: Right arm, Patient Position: Sitting)   Pulse 105   Resp 16   Ht 1.651 m (5' 5\")   Wt 117.9 kg (260 lb)   SpO2 97%   BMI 43.27 kg/m     General: No acute distress.    Eyes: Conjunctivae are normal.  MSK: Moves all extremities.  No obvious deformity.  Neuro: The patient is fully oriented. Speech is normal. Extraocular movements are intact without nystagmus. Facial sensation is intact in V1, V2, V3 distributions. Facial nerve function is normal, rated as a House Brackmann 1. Gait is normal.   Psych: Normal mood and affect. Behavior is normal.    Incision: Over the proximal aspect of the shunt valve is a superficial, round wound which looks like the remnants of a pimple; it is open but not weeping or draining anything. Superficial, no exposure to the valve. No surrounding erythema or swelling.   Chest: Over the right chest wall, just above the right breast, is a superficial, moveable lump which is small (0.5cm, perhaps). I had a hard time tracing her shunt tubing to this area.     Procedure:  With patient's verbal permission her Certas Plus valve was interrogated, found to be set to 4. Rechecked x 2.    Imaging:  No new imaging to review today     Assessment:  Idiopathic intracranial hypertension s/p right occipital  shunt placement 2020 (Haseem, placed in [place]) with recurrent papilledema and shunt malfunction s/p right occipital  shunt revision 1/22/2024 c/b positive intraoperative cultures s/p shunt removal 1/29/2024 and redo  shunt placement 2/08/2024 (Certas at 4), follow up visit  Superficial healing wound, right frontal scalp  Soft tissue swelling, right chest wall    Plan:  We reviewed follow up in 1 year without imaging, will see Leigh sooner with any new concerns. I asked her to schedule NeuroOphthalmology follow up in the next few months. "   We discussed the importance of refraining from touching or picking at this area, as she admits she has a hard time at night and thinks she's messing with it during sleep. I asked her to apply bacitracin or aquaphor at night, covering as needed. She should return if this worsens.   I asked her to return to Primary Care for ultrasound to ensure this is not separate from her shunt tubing, as it's difficult to trace the tubing today. Would advise they rule out other etiologies for the lump.        Dolly Garcia PA-C  Department of Neurosurgery

## 2024-05-24 ENCOUNTER — TELEPHONE (OUTPATIENT)
Dept: OPHTHALMOLOGY | Facility: CLINIC | Age: 24
End: 2024-05-24

## 2024-05-24 NOTE — TELEPHONE ENCOUNTER
Called and spoke to Leigh     The time and date Hamlet scheduled did not work - requesting a later time     Made an appointment in July at 2 pm     Kristy Bonilla Communication Facilitator on 5/24/2024 at 2:47 PM

## 2024-05-24 NOTE — TELEPHONE ENCOUNTER
[facility] Call Center    Phone Message    May a detailed message be left on voicemail: yes     Reason for Call: Appointment Intake    Referring Provider Name: n/a  Diagnosis and/or Symptoms: Check Papilledema, follow up/check for any swelling from recent procedure.    Per protocol, writer to send TE for Papilledema     Action Taken: Message routed to:  [facility] eye    Travel Screening: Not Applicable

## 2024-05-24 NOTE — TELEPHONE ENCOUNTER
Scheduled next available return time slot with Dr. Brand on June 4th at 8 AM.    Shunt surgery beginning of year    Pt feels vision has improved.    Pt receiving call from another clinic and asked for clinic to call later today    Will forward to facilitator to confirm June 4th scheduling or assist in rescheduling options.    Ok for next available return neuro time slot    Hamlet Hightower RN 1:56 PM 05/24/24

## 2024-09-23 ENCOUNTER — TRANSCRIBE ORDERS (OUTPATIENT)
Dept: OTHER | Age: 24
End: 2024-09-23

## 2024-09-23 DIAGNOSIS — G93.2 IDIOPATHIC INTRACRANIAL HYPERTENSION: Primary | ICD-10-CM

## 2024-09-23 DIAGNOSIS — Z86.69 HISTORY OF ATYPICAL MIGRAINE: ICD-10-CM

## 2024-10-04 ENCOUNTER — HOSPITAL ENCOUNTER (EMERGENCY)
Facility: CLINIC | Age: 24
Discharge: HOME | End: 2024-10-04
Attending: EMERGENCY MEDICINE | Admitting: EMERGENCY MEDICINE
Payer: COMMERCIAL

## 2024-10-04 ENCOUNTER — APPOINTMENT (OUTPATIENT)
Dept: CT IMAGING | Facility: CLINIC | Age: 24
End: 2024-10-04
Attending: EMERGENCY MEDICINE
Payer: COMMERCIAL

## 2024-10-04 ENCOUNTER — ALLIED HEALTH/NURSE VISIT (OUTPATIENT)
Dept: OPHTHALMOLOGY | Facility: CLINIC | Age: 24
End: 2024-10-04
Attending: OPHTHALMOLOGY
Payer: COMMERCIAL

## 2024-10-04 ENCOUNTER — APPOINTMENT (OUTPATIENT)
Dept: GENERAL RADIOLOGY | Facility: CLINIC | Age: 24
End: 2024-10-04
Attending: EMERGENCY MEDICINE
Payer: COMMERCIAL

## 2024-10-04 VITALS
WEIGHT: 252.6 LBS | TEMPERATURE: 98.1 F | DIASTOLIC BLOOD PRESSURE: 62 MMHG | HEIGHT: 65 IN | HEART RATE: 94 BPM | SYSTOLIC BLOOD PRESSURE: 111 MMHG | OXYGEN SATURATION: 96 % | RESPIRATION RATE: 16 BRPM | BODY MASS INDEX: 42.09 KG/M2

## 2024-10-04 DIAGNOSIS — G93.2 IIH (IDIOPATHIC INTRACRANIAL HYPERTENSION): Primary | ICD-10-CM

## 2024-10-04 DIAGNOSIS — R51.9 NONINTRACTABLE HEADACHE, UNSPECIFIED CHRONICITY PATTERN, UNSPECIFIED HEADACHE TYPE: ICD-10-CM

## 2024-10-04 DIAGNOSIS — H47.11 PAPILLEDEMA ASSOCIATED WITH INCREASED INTRACRANIAL PRESSURE: Primary | ICD-10-CM

## 2024-10-04 DIAGNOSIS — Z98.2 S/P VP SHUNT: ICD-10-CM

## 2024-10-04 LAB
ALBUMIN UR-MCNC: 10 MG/DL
ANION GAP SERPL CALCULATED.3IONS-SCNC: 15 MMOL/L (ref 7–15)
APPEARANCE UR: CLEAR
BACTERIA #/AREA URNS HPF: ABNORMAL /HPF
BASOPHILS # BLD AUTO: 0.1 10E3/UL (ref 0–0.2)
BASOPHILS NFR BLD AUTO: 1 %
BILIRUB UR QL STRIP: NEGATIVE
BUN SERPL-MCNC: 8.7 MG/DL (ref 6–20)
CALCIUM SERPL-MCNC: 9.3 MG/DL (ref 8.8–10.4)
CHLORIDE SERPL-SCNC: 102 MMOL/L (ref 98–107)
COLOR UR AUTO: YELLOW
CREAT SERPL-MCNC: 0.69 MG/DL (ref 0.51–0.95)
CRP SERPL-MCNC: 7.25 MG/L
EGFRCR SERPLBLD CKD-EPI 2021: >90 ML/MIN/1.73M2
EOSINOPHIL # BLD AUTO: 0.3 10E3/UL (ref 0–0.7)
EOSINOPHIL NFR BLD AUTO: 3 %
ERYTHROCYTE [DISTWIDTH] IN BLOOD BY AUTOMATED COUNT: 13.6 % (ref 10–15)
ERYTHROCYTE [SEDIMENTATION RATE] IN BLOOD BY WESTERGREN METHOD: 13 MM/HR (ref 0–20)
GLUCOSE SERPL-MCNC: 97 MG/DL (ref 70–99)
GLUCOSE UR STRIP-MCNC: NEGATIVE MG/DL
HCG INTACT+B SERPL-ACNC: <1 MIU/ML
HCO3 SERPL-SCNC: 21 MMOL/L (ref 22–29)
HCT VFR BLD AUTO: 39.9 % (ref 35–47)
HGB BLD-MCNC: 13 G/DL (ref 11.7–15.7)
HGB UR QL STRIP: NEGATIVE
HOLD SPECIMEN: NORMAL
HYALINE CASTS: 7 /LPF
IMM GRANULOCYTES # BLD: 0 10E3/UL
IMM GRANULOCYTES NFR BLD: 0 %
INR PPP: 0.98 (ref 0.85–1.15)
KETONES UR STRIP-MCNC: NEGATIVE MG/DL
LEUKOCYTE ESTERASE UR QL STRIP: NEGATIVE
LYMPHOCYTES # BLD AUTO: 4.5 10E3/UL (ref 0.8–5.3)
LYMPHOCYTES NFR BLD AUTO: 46 %
MCH RBC QN AUTO: 25.9 PG (ref 26.5–33)
MCHC RBC AUTO-ENTMCNC: 32.6 G/DL (ref 31.5–36.5)
MCV RBC AUTO: 80 FL (ref 78–100)
MONOCYTES # BLD AUTO: 0.6 10E3/UL (ref 0–1.3)
MONOCYTES NFR BLD AUTO: 6 %
MUCOUS THREADS #/AREA URNS LPF: PRESENT /LPF
NEUTROPHILS # BLD AUTO: 4.3 10E3/UL (ref 1.6–8.3)
NEUTROPHILS NFR BLD AUTO: 44 %
NITRATE UR QL: NEGATIVE
NRBC # BLD AUTO: 0 10E3/UL
NRBC BLD AUTO-RTO: 0 /100
PH UR STRIP: 6 [PH] (ref 5–7)
PLATELET # BLD AUTO: 382 10E3/UL (ref 150–450)
POTASSIUM SERPL-SCNC: 5 MMOL/L (ref 3.4–5.3)
RBC # BLD AUTO: 5.01 10E6/UL (ref 3.8–5.2)
RBC URINE: 1 /HPF
SODIUM SERPL-SCNC: 138 MMOL/L (ref 135–145)
SP GR UR STRIP: 1.02 (ref 1–1.03)
SQUAMOUS EPITHELIAL: 3 /HPF
UROBILINOGEN UR STRIP-MCNC: NORMAL MG/DL
WBC # BLD AUTO: 9.7 10E3/UL (ref 4–11)
WBC URINE: 1 /HPF

## 2024-10-04 PROCEDURE — 96374 THER/PROPH/DIAG INJ IV PUSH: CPT | Mod: 59 | Performed by: EMERGENCY MEDICINE

## 2024-10-04 PROCEDURE — 62252 CSF SHUNT REPROGRAM: CPT

## 2024-10-04 PROCEDURE — 92133 CPTRZD OPH DX IMG PST SGM ON: CPT | Mod: 26 | Performed by: OPHTHALMOLOGY

## 2024-10-04 PROCEDURE — 85610 PROTHROMBIN TIME: CPT | Performed by: EMERGENCY MEDICINE

## 2024-10-04 PROCEDURE — 92083 EXTENDED VISUAL FIELD XM: CPT | Mod: 26 | Performed by: OPHTHALMOLOGY

## 2024-10-04 PROCEDURE — 99284 EMERGENCY DEPT VISIT MOD MDM: CPT | Performed by: EMERGENCY MEDICINE

## 2024-10-04 PROCEDURE — 74019 RADEX ABDOMEN 2 VIEWS: CPT | Mod: 26 | Performed by: RADIOLOGY

## 2024-10-04 PROCEDURE — 70450 CT HEAD/BRAIN W/O DYE: CPT | Mod: 26 | Performed by: RADIOLOGY

## 2024-10-04 PROCEDURE — 80048 BASIC METABOLIC PNL TOTAL CA: CPT | Performed by: EMERGENCY MEDICINE

## 2024-10-04 PROCEDURE — 99284 EMERGENCY DEPT VISIT MOD MDM: CPT | Mod: 25 | Performed by: EMERGENCY MEDICINE

## 2024-10-04 PROCEDURE — 85004 AUTOMATED DIFF WBC COUNT: CPT | Performed by: EMERGENCY MEDICINE

## 2024-10-04 PROCEDURE — 258N000003 HC RX IP 258 OP 636: Performed by: EMERGENCY MEDICINE

## 2024-10-04 PROCEDURE — 81003 URINALYSIS AUTO W/O SCOPE: CPT | Performed by: EMERGENCY MEDICINE

## 2024-10-04 PROCEDURE — 99207 PR NO CHARGE COORDINATED CARE PS: CPT | Performed by: OPHTHALMOLOGY

## 2024-10-04 PROCEDURE — 36415 COLL VENOUS BLD VENIPUNCTURE: CPT | Performed by: EMERGENCY MEDICINE

## 2024-10-04 PROCEDURE — 99214 OFFICE O/P EST MOD 30 MIN: CPT | Mod: 25

## 2024-10-04 PROCEDURE — 74018 RADEX ABDOMEN 1 VIEW: CPT

## 2024-10-04 PROCEDURE — 85652 RBC SED RATE AUTOMATED: CPT | Performed by: EMERGENCY MEDICINE

## 2024-10-04 PROCEDURE — 84702 CHORIONIC GONADOTROPIN TEST: CPT | Performed by: EMERGENCY MEDICINE

## 2024-10-04 PROCEDURE — 86140 C-REACTIVE PROTEIN: CPT | Performed by: EMERGENCY MEDICINE

## 2024-10-04 PROCEDURE — 96375 TX/PRO/DX INJ NEW DRUG ADDON: CPT | Performed by: EMERGENCY MEDICINE

## 2024-10-04 PROCEDURE — 92083 EXTENDED VISUAL FIELD XM: CPT

## 2024-10-04 PROCEDURE — 99213 OFFICE O/P EST LOW 20 MIN: CPT | Mod: 4UV | Performed by: OPHTHALMOLOGY

## 2024-10-04 PROCEDURE — 250N000011 HC RX IP 250 OP 636: Performed by: EMERGENCY MEDICINE

## 2024-10-04 PROCEDURE — 96361 HYDRATE IV INFUSION ADD-ON: CPT | Performed by: EMERGENCY MEDICINE

## 2024-10-04 PROCEDURE — 71045 X-RAY EXAM CHEST 1 VIEW: CPT | Mod: 26 | Performed by: RADIOLOGY

## 2024-10-04 PROCEDURE — 70250 X-RAY EXAM OF SKULL: CPT | Mod: 26 | Performed by: RADIOLOGY

## 2024-10-04 PROCEDURE — 92133 CPTRZD OPH DX IMG PST SGM ON: CPT

## 2024-10-04 PROCEDURE — 70450 CT HEAD/BRAIN W/O DYE: CPT

## 2024-10-04 RX ORDER — DIPHENHYDRAMINE HYDROCHLORIDE 50 MG/ML
25 INJECTION INTRAMUSCULAR; INTRAVENOUS ONCE
Status: COMPLETED | OUTPATIENT
Start: 2024-10-04 | End: 2024-10-04

## 2024-10-04 RX ORDER — METOCLOPRAMIDE HYDROCHLORIDE 5 MG/ML
10 INJECTION INTRAMUSCULAR; INTRAVENOUS ONCE
Status: COMPLETED | OUTPATIENT
Start: 2024-10-04 | End: 2024-10-04

## 2024-10-04 RX ORDER — KETOROLAC TROMETHAMINE 15 MG/ML
15 INJECTION, SOLUTION INTRAMUSCULAR; INTRAVENOUS ONCE
Status: COMPLETED | OUTPATIENT
Start: 2024-10-04 | End: 2024-10-04

## 2024-10-04 RX ADMIN — METOCLOPRAMIDE 10 MG: 5 INJECTION, SOLUTION INTRAMUSCULAR; INTRAVENOUS at 08:48

## 2024-10-04 RX ADMIN — KETOROLAC TROMETHAMINE 15 MG: 15 INJECTION, SOLUTION INTRAMUSCULAR; INTRAVENOUS at 08:46

## 2024-10-04 RX ADMIN — SODIUM CHLORIDE 1000 ML: 9 INJECTION, SOLUTION INTRAVENOUS at 08:57

## 2024-10-04 RX ADMIN — DIPHENHYDRAMINE HYDROCHLORIDE 25 MG: 50 INJECTION, SOLUTION INTRAMUSCULAR; INTRAVENOUS at 08:45

## 2024-10-04 ASSESSMENT — REFRACTION_WEARINGRX
OS_SPHERE: -5.00
OS_CYLINDER: +0.50
OD_CYLINDER: SPHERE
SPECS_TYPE: SVL
OD_SPHERE: -4.50
OS_AXIS: 149

## 2024-10-04 ASSESSMENT — ACTIVITIES OF DAILY LIVING (ADL)
ADLS_ACUITY_SCORE: 38

## 2024-10-04 ASSESSMENT — COLUMBIA-SUICIDE SEVERITY RATING SCALE - C-SSRS
1. IN THE PAST MONTH, HAVE YOU WISHED YOU WERE DEAD OR WISHED YOU COULD GO TO SLEEP AND NOT WAKE UP?: NO
2. HAVE YOU ACTUALLY HAD ANY THOUGHTS OF KILLING YOURSELF IN THE PAST MONTH?: NO
6. HAVE YOU EVER DONE ANYTHING, STARTED TO DO ANYTHING, OR PREPARED TO DO ANYTHING TO END YOUR LIFE?: YES

## 2024-10-04 ASSESSMENT — VISUAL ACUITY
CORRECTION_TYPE: GLASSES
OD_PH_CC: 20/25
OS_CC: 20/25
OD_CC+: -2
OS_CC+: -2
OD_CC: 20/30
METHOD: SNELLEN - LINEAR

## 2024-10-04 NOTE — CONSULTS
OPHTHALMOLOGY CONSULT NOTE  10/04/24    I have not seen or examined the patient, but was available if the need had arisen. I have reviewed the chart and key elements of this encounter and I concur with the resident's assessment and plan with the following summary/additions:    Assessment:  -Improved papilledema since last visit associated with Idiopathic intracranial hypertension  -Worsening headaches     Overall, patients papilledema has improved since last visit.     Plan:   - Appreciate neuro-surgeries recommendation regarding  shunt function  - Ophthalmology signing out  - Please contact ophthalmology resident on call with any questions or concerns. We appreciate your care of this patient.    Patient is a 24 year old female, with last placement in 2/8/24 for idiopathic intracranial hypertension, who presents with 1 week of headaches all around her head that are getting worse and extend to her neck. She also feels pain in her eyes and has been vomiting. She describes transient visual obscurations and flashes in darkness in her eyes. The headaches are worse when she changes her position.     -On ophthalmology exam: Exam:  VA 20/20 OD, 20/20 OS. Pupils no rAPD. Colour vision 11/11 both eyes . Anterior segment wnl for age. Posterior segment reveals bilateral trace grade 1 optic disc edema    Imaging today:     10/4/24 OCT RNFL  Reliable, improved since last visit in both eyes.  Right eye 92 from 122 on 10/31/23  Left eye 93 from 139 on 10/31/23    10/4/24 Octopus VF  Reliable, enlarged blind spot in both eyes, otherwise unremarkable.    10/4/24 CT Head w/o contrast  Findings: Decreased caliber of the ventricular system compared to 2/21/2024 with slitlike appearance of the lateral ventricles and near complete effacement of the third ventricle.    Impression:IMPRESSION: Decreased caliber of the shunted ventricular system compared to 2/21/2024 with right frontal approach ventriculoperitoneal shunt catheter tip in  stable position near the foramen of Hernandez.     Please direct all questions or concerns to the on-call ophthalmology resident.    Bartolome West MD   Fellow, Neuro-ophthalmology     Patient: Leigh Sloan  ASSESSMENT/PLAN:     Leigh Sloan is a 24 year old female with Hx of Idiopathic intracranial hypertension s/p right occipital  shunt placement 2020 (placed in [place]) with recurrent papilledema and shunt malfunction s/p right occipital  shunt revision 1/22/2024  () c/b positive intraoperative cultures s/p shunt removal 1/29/2024 and redo  shunt placement 2/08/2024 (Certas at 4), who presented with one week history of headaches and transeint vision changes.     Idiopathic intracranial hypertension  Va 20/20 both eyes, IOP 16/15, No APD, color vision full, confrontational visual fields full. Anterior segment exam was unremarkable in both eyes. Dilated exam with 1+ disc edema in both eyes. No appreciable disc heme, macular pathology, or peripheral retinal pathology. CT head  showed decrease caliber of the shunted ventricular system compared to 2/21/24. Preliminary read of XR shunt series showed Intact ventriculoperitoneal shunt with sharp angle versus questionable kink adjacent to the distal tip in the right tyler-midabdomen.  However awaiting final radiologic read.  Symptoms and optic disc edema concerning for IIH flare.    Assessment:  -Improved papilledema since last visit associated with Idiopathic intracranial hypertension  - Worsening headaches    Overall, patients papilledema has improved since last visit.     Plan:   - Appreciate neuro-surgeries recommendation regarding  shunt function  - Ophthalmology signing out  - Please contact ophthalmology resident on call with any questions or concerns. We appreciate your care of this patient.      Discussed with  who agreed with this assessment and plan.       Thank you for entrusting us with your care  Phoebe Shah  MD, PGY2  Ophthalmology Resident  [facility]    HISTORY OF PRESENTING ILLNESS:     Leigh Sloan is a 24 year old female who presented on 10/4/24 to the ED with one week Hx of headaches and transient visual obscurations. Patient reports she has been symptom free from an IIH standpoint since her new  shunt was placed in February 2024. However, one week ago she began having intermittent headaches and  episodes of grey out of her vision in both eyes. These transient viison changes would lasts a few seconds at a time and is usually associated with position changes.     Last neuro-ophthalmology visit was on 10/31/23 ()    It is my impression that patient has bilateral optic disc edema which likely represents a recurrence of IIH.  Her opening pressure was 25 which is consistent with an elevated opening pressure.  The bilateral optic disc edema in conjunction with increased headaches, pulsatile tinnitus, and TVO are concerning for elevated intracranial pressure.  She recently had her shunt assessed and it was felt that her shunt was in good working order.  She apparently has an adjustable shunt placed and I wonder if it is possible to have it adjusted lower.  I will reach out to neurosurgery to find out who she might see for this.     OCT rNFL :  Right Eye  Average Thickness Value: 122   . Interpretation Free Text: Superior elevation   . Plan: Adjust medications   . Interval: Initial   .     Left Eye  Average Thickness Value: 139   . Interpretation Free Text: Superior elevation   . Plan: Adjust medications   . Interval: Initial       10+ review of systems were otherwise negative except for that which has been stated above.      OCULAR/MEDICAL/SURGICAL HISTORIES:     Past Ocular History:   Last eye exam: 10/31/23   Previously diagnosed ocular conditions: IIH  Prior eye surgery/laser: None  Contact lens wear: None  Glasses: Yes  Eyedrops: None    Pertinent Systemic Medications:   No current  facility-administered medications for this encounter.     Current Outpatient Medications   Medication Sig Dispense Refill    albuterol (PROAIR HFA) 108 (90 Base) MCG/ACT inhaler INHALE 2 PUFFS BY MOUTH FOUR TIMES A DAY AS NEEDED      budesonide-formoterol (SYMBICORT) 80-4.5 MCG/ACT Inhaler Inhale 2 puffs into the lungs as needed      cetirizine (ZYRTEC) 10 MG tablet Take 10 mg by mouth as needed      cyclobenzaprine (FLEXERIL) 10 MG tablet Take 10 mg by mouth 3 times daily as needed      drospirenone-ethinyl estradiol (SAMMY) 3-0.02 MG tablet Take 1 tablet by mouth at bedtime      hydrOXYzine (VISTARIL) 25 MG capsule Take 25 mg by mouth as needed      meclizine (ANTIVERT) 25 MG tablet Take 12.5-25 mg by mouth 3 times daily as needed      Melatonin 10 MG CHEW Take 10 mg by mouth      meloxicam (MOBIC) 15 MG tablet Take 1 tablet (15 mg) by mouth at bedtime      metoprolol succinate ER (TOPROL XL) 50 MG 24 hr tablet Take 1 tablet by mouth at bedtime      omeprazole (PRILOSEC) 20 MG DR capsule Take 20 mg by mouth at bedtime      polyethylene glycol (MIRALAX) 17 GM/Dose powder Take 17 g by mouth daily 510 g 0    polyethylene glycol (MIRALAX) 17 GM/Dose powder Take 17 g by mouth daily 510 g 0    prazosin (MINIPRESS) 2 MG capsule Take 1 capsule by mouth at bedtime      QUEtiapine Fumarate 150 MG TABS Take 1 tablet by mouth at bedtime      Respiratory Therapy Supplies (ACE AEROSOL CLOUD ENHANCER) MISC See Admin Instructions      senna-docusate (SENOKOT-S/PERICOLACE) 8.6-50 MG tablet Take 1 tablet by mouth 2 times daily 30 tablet 0    sertraline (ZOLOFT) 25 MG tablet Take 75 mg by mouth at bedtime          Past Medical History:  Past Medical History:   Diagnosis Date    Acid reflux     Anxiety     Asthma     Depression     High cholesterol     IIH (idiopathic intracranial hypertension)     Non-alcoholic fatty liver disease     PCOS (polycystic ovarian syndrome)     PTSD (post-traumatic stress disorder)     Scoliosis      Tachycardia        Past Surgical History:   Past Surgical History:   Procedure Laterality Date    CV HEART CATHETERIZATION WITH POSSIBLE INTERVENTION      IMPLANT SHUNT VENTRICULOPERITONEAL      IR LUMBAR DRAIN PLACEMENT W FLUORO  01/30/2024    LAPAROSCOPIC ASSISTED IMPLANT SHUNT VENTRICULOPERITONEAL N/A 2/8/2024    Procedure: Laparoscopic Assisted Implant Ventriculoperitoneal Shunt;  Surgeon: Abraham Zepeda MD;  Location: [facility] OR    OPTICAL TRACKING SYSTEM IMPLANT SHUNT VENTRICULOPERITONEAL Right 01/22/2024    Procedure: Stealth guided right sided ventriculoperitoneal shunt exploration and replacement of right sided proximal ventricular catheter and shunt valve;  Surgeon: Calvin Cabrera MD;  Location: [facility] OR    OPTICAL TRACKING SYSTEM IMPLANT SHUNT VENTRICULOPERITONEAL Right 2/8/2024    Procedure: Stealth assisted RIGHT Implant Ventriculoperitoneal Shunt and Lumbar drain removal;  Surgeon: Calvin Cabrera MD;  Location: [facility] OR    PICC DOUBLE LUMEN PLACEMENT Left 01/31/2024    left basilic 4 fr dl power picc 48 cm    REMOVE SHUNT VENTRICULOPERITONEAL Right 1/29/2024    Procedure: REMOVAL, SHUNT, VENTRICULOPERITONEAL;  Surgeon: Calvin Cabrera MD;  Location: [facility] OR       Family History:   No history of macular degeneration or glaucoma    Social History:   No tobacco use    EXAMINATION:     Base Eye Exam       Neuro/Psych       Oriented x3: Yes    Mood/Affect: Normal                  Slit Lamp and Fundus Exam       External Exam         Right Left    External Normal Normal              Slit Lamp Exam         Right Left    Lids/Lashes Normal Normal    Conjunctiva/Sclera White and quiet White and quiet    Cornea Clear Clear    Anterior Chamber Deep and quiet Deep and quiet    Iris Dilated Dilated    Lens Clear Clear    Anterior Vitreous Normal Normal              Fundus Exam         Right Left    Disc Grade 1 ODE, temporal sparing Grade 1 ODE, temporal sparing    C/D Ratio 0.4 0.4    Macula  Normal Normal    Vessels Normal Normal    Periphery Normal Normal                    Labs/Studies/Imaging Performed  Results for orders placed or performed during the hospital encounter of 10/04/24   CT Head w/o Contrast    Impression    IMPRESSION: Decreased caliber of the shunted ventricular system  compared to 2/21/2024 with right frontal approach ventriculoperitoneal  shunt catheter tip in stable position near the foramen of Hernandez.     I have personally reviewed the examination and initial interpretation  and I agree with the findings.    DUYEN BLANCHARD MD         SYSTEM ID:  [id number]   XR Shunt Malfunction Survey    Impression    IMPRESSION: Intact ventriculoperitoneal shunt . No definite evidence  of discontinuity or kinking of the shunt catheter.     I have personally reviewed the examination and initial interpretation  and I agree with the findings.    DUYEN BLANCHARD MD         SYSTEM ID:  [id number]         [facility]  Ophthalmology PGY-2   [facility]

## 2024-10-04 NOTE — PROGRESS NOTES
Assessment & Plan     Leigh Sloan is a 24 year old female with the following diagnoses:   1. Papilledema associated with increased intracranial pressure         This patient came for a visual field and optical coherence tomography only.  The visual field is unremarkable.  Her retinal nerve fiber layer is in the normal range.                Attending Physician Attestation:  Complete documentation of historical and exam elements from today's encounter can be found in the full encounter summary report (not reduplicated in this progress note).  I personally obtained the chief complaint(s) and history of present illness.  I confirmed and edited as necessary the review of systems, past medical/surgical history, family history, social history, and examination findings as documented by others; and I examined the patient myself.  I personally reviewed the relevant tests, images, and reports as documented above.  I formulated and edited as necessary the assessment and plan and discussed the findings and management plan with the patient and family. - Carlos Brand MD

## 2024-10-04 NOTE — ED PROVIDER NOTES
ED Provider Note  [facility]      History     Chief Complaint   Patient presents with    Headache    Dizziness    Shunt Malfunction     HPI  Leigh Sloan is a 24 year old female with a past medical history of IIH (s/p shunt placement 2020, replaced in January 2024), migraines, postural dizziness presenting with headaches and light-headedness. The headache started a week ago. She notes it has gotten worse over the last week, and she notes headache which feels like significant pressure in her head. The pain has extended to her eyes where she also feels immense pressure. She notes she gets flashes of darkness with her vision; these episodes are short but recurrent. She now also has pain extending to her neck and spine. The lightheadedness and headache are worse when she is standing up or moving. The lightheadedness improves when lying flat. She notes these symptoms are similar to symptoms she had in January at which point her shunt needed replacement. She has had minimal water intake as she vomits if she drinks too much water.     Past Medical History  Past Medical History:   Diagnosis Date    Acid reflux     Anxiety     Asthma     Depression     High cholesterol     IIH (idiopathic intracranial hypertension)     Non-alcoholic fatty liver disease     PCOS (polycystic ovarian syndrome)     PTSD (post-traumatic stress disorder)     Scoliosis     Tachycardia      Past Surgical History:   Procedure Laterality Date    CV HEART CATHETERIZATION WITH POSSIBLE INTERVENTION      IMPLANT SHUNT VENTRICULOPERITONEAL      IR LUMBAR DRAIN PLACEMENT W FLUORO  01/30/2024    LAPAROSCOPIC ASSISTED IMPLANT SHUNT VENTRICULOPERITONEAL N/A 2/8/2024    Procedure: Laparoscopic Assisted Implant Ventriculoperitoneal Shunt;  Surgeon: Abraham Zepeda MD;  Location: [facility] OR    OPTICAL TRACKING SYSTEM IMPLANT SHUNT VENTRICULOPERITONEAL Right 01/22/2024    Procedure: Stealth guided right sided ventriculoperitoneal  shunt exploration and replacement of right sided proximal ventricular catheter and shunt valve;  Surgeon: Calvin Cabrera MD;  Location: [facility] OR    OPTICAL TRACKING SYSTEM IMPLANT SHUNT VENTRICULOPERITONEAL Right 2/8/2024    Procedure: Stealth assisted RIGHT Implant Ventriculoperitoneal Shunt and Lumbar drain removal;  Surgeon: Calvin Cabrera MD;  Location: [facility] OR    PICC DOUBLE LUMEN PLACEMENT Left 01/31/2024    left basilic 4 fr dl power picc 48 cm    REMOVE SHUNT VENTRICULOPERITONEAL Right 1/29/2024    Procedure: REMOVAL, SHUNT, VENTRICULOPERITONEAL;  Surgeon: Calvin Cabrera MD;  Location: [facility] OR     albuterol (PROAIR HFA) 108 (90 Base) MCG/ACT inhaler  budesonide-formoterol (SYMBICORT) 80-4.5 MCG/ACT Inhaler  cetirizine (ZYRTEC) 10 MG tablet  cyclobenzaprine (FLEXERIL) 10 MG tablet  drospirenone-ethinyl estradiol (SAMMY) 3-0.02 MG tablet  hydrOXYzine (VISTARIL) 25 MG capsule  meclizine (ANTIVERT) 25 MG tablet  Melatonin 10 MG CHEW  meloxicam (MOBIC) 15 MG tablet  metoprolol succinate ER (TOPROL XL) 50 MG 24 hr tablet  omeprazole (PRILOSEC) 20 MG DR capsule  polyethylene glycol (MIRALAX) 17 GM/Dose powder  polyethylene glycol (MIRALAX) 17 GM/Dose powder  prazosin (MINIPRESS) 2 MG capsule  QUEtiapine Fumarate 150 MG TABS  Respiratory Therapy Supplies (ACE AEROSOL CLOUD ENHANCER) MISC  senna-docusate (SENOKOT-S/PERICOLACE) 8.6-50 MG tablet  sertraline (ZOLOFT) 25 MG tablet      Allergies   Allergen Reactions    Diamox [Acetazolamide] Hives    Sulfa Antibiotics Unknown    Sulfamethoxazole-Trimethoprim Nausea and Nausea and Vomiting    Trimethoprim Rash     Family History  Family History   Problem Relation Age of Onset    Anxiety Disorder Mother     Ataxia Mother     Depression Mother     Diabetes Mother     Fibromyalgia Mother     Hypertension Mother     Anxiety Disorder Father     Depression Father     Diabetes Father     Anxiety Disorder Sister     Bipolar Disorder Sister     Fibromyalgia  "Sister     Breast Cancer Paternal Grandmother     Skin Cancer Paternal Grandmother     Colon Cancer Paternal Grandfather     Nystagmus No family hx of     Strabismus No family hx of     Amblyopia No family hx of     Glasses (<9 y/o) No family hx of      Social History   Social History     Tobacco Use    Smoking status: Never    Smokeless tobacco: Never   Substance Use Topics    Alcohol use: Not Currently    Drug use: Not Currently     Comment: Gummies on occasion      Past medical history, past surgical history, medications, allergies, family history, and social history were reviewed with the patient. No additional pertinent items.   Review of Systems    Physical Exam   BP: 105/71  Pulse: 108  Temp: 98.1  F (36.7  C)  Resp: 16  Height: 165.1 cm (5' 5\")  Weight: 114.6 kg (252 lb 9.6 oz)  SpO2: 98 %  Physical Exam  HENT:      Head: Normocephalic and atraumatic.      Nose: Nose normal.   Eyes:      Conjunctiva/sclera: Conjunctivae normal.      Pupils: Pupils are equal, round, and reactive to light.   Cardiovascular:      Rate and Rhythm: Tachycardia present.   Pulmonary:      Effort: Pulmonary effort is normal.      Breath sounds: Normal breath sounds.   Abdominal:      General: Abdomen is flat.      Palpations: Abdomen is soft.   Musculoskeletal:         General: Tenderness (throughout the entire neck and spine) present.   Neurological:      Mental Status: She is alert.       ED Course, Procedures, & Data     ED Course as of 10/05/24 0808   Fri Oct 04, 2024   1029 CT Head w/o Contrast     Procedures          Results for orders placed or performed during the hospital encounter of 10/04/24   CT Head w/o Contrast     Status: None    Narrative    EXAM: CT HEAD W/O CONTRAST  10/4/2024 9:36 AM     HISTORY:  Headache,  shunt       COMPARISON:  Head CT 2/21/2024, same day x-ray shunt series    TECHNIQUE: Using multidetector thin collimation helical acquisition  technique, axial, coronal and sagittal CT images from the skull " base  to the vertex were obtained without intravenous contrast.   (topogram) image(s) also obtained and reviewed.    FINDINGS:  Stable right frontal approach ventriculoperitoneal shunt catheter with  tip terminating near the foramen of Hernandez. Decreased caliber of the  ventricular system compared to 2/21/2024 with slitlike appearance of  the lateral ventricles and near complete effacement of the third  ventricle. Dural venous sinuses appear unremarkable. No acute  intracranial hemorrhage, mass effect, or midline shift. No acute loss  of gray-white matter differentiation in the cerebral hemispheres.  Clear basal cisterns.    No acute osseous findings. The visualized portions of the paranasal  sinuses and mastoid air cells are clear. Grossly normal orbits.       Impression    IMPRESSION: Decreased caliber of the shunted ventricular system  compared to 2/21/2024 with right frontal approach ventriculoperitoneal  shunt catheter tip in stable position near the foramen of Hernandez.     I have personally reviewed the examination and initial interpretation  and I agree with the findings.    DUYEN BLANCHARD MD         SYSTEM ID:  [id number]   XR Shunt Malfunction Survey     Status: None    Narrative    EXAM: Shunt malfunction survey.    HISTORY: Evaluate intraventricular shunt    COMPARISON: 10/4/2024 CT head    FINDINGS:   There is a ventriculoperitoneal shunt in the skull that appears  intact. The shunt in the neck appears intact.    In the thorax, the shunt appears intact. Cardiac silhouette appears  well-defined. Pulmonary vasculature appears distinct. No acute  airspace opacity. Low lung volumes are likely secondary to incomplete  inspiration.    In the abdomen, the course of the shunt is intact without a focal  break.  The distal tip terminates in the right hemiabdomen.    Bowel gas pattern is within normal limits.       Impression    IMPRESSION: Intact ventriculoperitoneal shunt . No definite evidence  of  discontinuity or kinking of the shunt catheter.     I have personally reviewed the examination and initial interpretation  and I agree with the findings.    DUYEN BLANCHARD MD         SYSTEM ID:  [id number]   [place] Draw     Status: None    Narrative    The following orders were created for panel order [place] Draw.  Procedure                               Abnormality         Status                     ---------                               -----------         ------                     Extra Blue Top Tube[[id number]]                                                         Extra Red Top Tube[[id number]]                               Final result               Extra Green Top (Lithium...[[id number]]                                                 Extra Purple Top Tube[[id number]]                                                         Please view results for these tests on the individual orders.   UA with Microscopic reflex to Culture     Status: Abnormal    Specimen: Urine, Midstream   Result Value Ref Range    Color Urine Yellow Colorless, Straw, Light Yellow, Yellow    Appearance Urine Clear Clear    Glucose Urine Negative Negative mg/dL    Bilirubin Urine Negative Negative    Ketones Urine Negative Negative mg/dL    Specific Gravity Urine 1.017 1.003 - 1.035    Blood Urine Negative Negative    pH Urine 6.0 5.0 - 7.0    Protein Albumin Urine 10 (A) Negative mg/dL    Urobilinogen Urine Normal Normal, 2.0 mg/dL    Nitrite Urine Negative Negative    Leukocyte Esterase Urine Negative Negative    Bacteria Urine Few (A) None Seen /HPF    Mucus Urine Present (A) None Seen /LPF    RBC Urine 1 <=2 /HPF    WBC Urine 1 <=5 /HPF    Squamous Epithelials Urine 3 (H) <=1 /HPF    Hyaline Casts Urine 7 (H) <=2 /LPF    Narrative    Urine Culture not indicated   Basic metabolic panel     Status: Abnormal   Result Value Ref Range    Sodium 138 135 - 145 mmol/L    Potassium 5.0 3.4 - 5.3 mmol/L    Chloride 102 98 - 107 mmol/L    Carbon  Dioxide (CO2) 21 (L) 22 - 29 mmol/L    Anion Gap 15 7 - 15 mmol/L    Urea Nitrogen 8.7 6.0 - 20.0 mg/dL    Creatinine 0.69 0.51 - 0.95 mg/dL    GFR Estimate >90 >60 mL/min/1.73m2    Calcium 9.3 8.8 - 10.4 mg/dL    Glucose 97 70 - 99 mg/dL   CRP inflammation     Status: Abnormal   Result Value Ref Range    CRP Inflammation 7.25 (H) <5.00 mg/L   Erythrocyte sedimentation rate auto     Status: Normal   Result Value Ref Range    Erythrocyte Sedimentation Rate 13 0 - 20 mm/hr   HCG quantitative pregnancy     Status: Normal   Result Value Ref Range    hCG Quantitative <1 <5 mIU/mL   Extra Red Top Tube     Status: None   Result Value Ref Range    Hold Specimen JIC    CBC with platelets and differential     Status: Abnormal   Result Value Ref Range    WBC Count 9.7 4.0 - 11.0 10e3/uL    RBC Count 5.01 3.80 - 5.20 10e6/uL    Hemoglobin 13.0 11.7 - 15.7 g/dL    Hematocrit 39.9 35.0 - 47.0 %    MCV 80 78 - 100 fL    MCH 25.9 (L) 26.5 - 33.0 pg    MCHC 32.6 31.5 - 36.5 g/dL    RDW 13.6 10.0 - 15.0 %    Platelet Count 382 150 - 450 10e3/uL    % Neutrophils 44 %    % Lymphocytes 46 %    % Monocytes 6 %    % Eosinophils 3 %    % Basophils 1 %    % Immature Granulocytes 0 %    NRBCs per 100 WBC 0 <1 /100    Absolute Neutrophils 4.3 1.6 - 8.3 10e3/uL    Absolute Lymphocytes 4.5 0.8 - 5.3 10e3/uL    Absolute Monocytes 0.6 0.0 - 1.3 10e3/uL    Absolute Eosinophils 0.3 0.0 - 0.7 10e3/uL    Absolute Basophils 0.1 0.0 - 0.2 10e3/uL    Absolute Immature Granulocytes 0.0 <=0.4 10e3/uL    Absolute NRBCs 0.0 10e3/uL   INR     Status: Normal   Result Value Ref Range    INR 0.98 0.85 - 1.15   CBC with platelets differential     Status: Abnormal    Narrative    The following orders were created for panel order CBC with platelets differential.  Procedure                               Abnormality         Status                     ---------                               -----------         ------                     CBC with platelets and  d...[[id number]]  Abnormal            Final result                 Please view results for these tests on the individual orders.     Medications   sodium chloride 0.9% BOLUS 1,000 mL (0 mLs Intravenous Stopped 10/4/24 1133)   ketorolac (TORADOL) injection 15 mg (15 mg Intravenous $Given 10/4/24 0846)   metoclopramide (REGLAN) injection 10 mg (10 mg Intravenous $Given 10/4/24 0848)   diphenhydrAMINE (BENADRYL) injection 25 mg (25 mg Intravenous $Given 10/4/24 0845)     Labs Ordered and Resulted from Time of ED Arrival to Time of ED Departure   ROUTINE UA WITH MICROSCOPIC REFLEX TO CULTURE - Abnormal       Result Value    Color Urine Yellow      Appearance Urine Clear      Glucose Urine Negative      Bilirubin Urine Negative      Ketones Urine Negative      Specific Gravity Urine 1.017      Blood Urine Negative      pH Urine 6.0      Protein Albumin Urine 10 (*)     Urobilinogen Urine Normal      Nitrite Urine Negative      Leukocyte Esterase Urine Negative      Bacteria Urine Few (*)     Mucus Urine Present (*)     RBC Urine 1      WBC Urine 1      Squamous Epithelials Urine 3 (*)     Hyaline Casts Urine 7 (*)    BASIC METABOLIC PANEL - Abnormal    Sodium 138      Potassium 5.0      Chloride 102      Carbon Dioxide (CO2) 21 (*)     Anion Gap 15      Urea Nitrogen 8.7      Creatinine 0.69      GFR Estimate >90      Calcium 9.3      Glucose 97     CRP INFLAMMATION - Abnormal    CRP Inflammation 7.25 (*)    CBC WITH PLATELETS AND DIFFERENTIAL - Abnormal    WBC Count 9.7      RBC Count 5.01      Hemoglobin 13.0      Hematocrit 39.9      MCV 80      MCH 25.9 (*)     MCHC 32.6      RDW 13.6      Platelet Count 382      % Neutrophils 44      % Lymphocytes 46      % Monocytes 6      % Eosinophils 3      % Basophils 1      % Immature Granulocytes 0      NRBCs per 100 WBC 0      Absolute Neutrophils 4.3      Absolute Lymphocytes 4.5      Absolute Monocytes 0.6      Absolute Eosinophils 0.3      Absolute Basophils 0.1       Absolute Immature Granulocytes 0.0      Absolute NRBCs 0.0     ERYTHROCYTE SEDIMENTATION RATE AUTO - Normal    Erythrocyte Sedimentation Rate 13     HCG QUANTITATIVE PREGNANCY - Normal    hCG Quantitative <1     INR - Normal    INR 0.98       CT Head w/o Contrast   Final Result   IMPRESSION: Decreased caliber of the shunted ventricular system   compared to 2/21/2024 with right frontal approach ventriculoperitoneal   shunt catheter tip in stable position near the foramen of Hernandez.       I have personally reviewed the examination and initial interpretation   and I agree with the findings.      DUYEN BLANCHARD MD            SYSTEM ID:  [id number]      XR Shunt Malfunction Survey   Final Result   IMPRESSION: Intact ventriculoperitoneal shunt . No definite evidence   of discontinuity or kinking of the shunt catheter.       I have personally reviewed the examination and initial interpretation   and I agree with the findings.      DUYEN BLANCHARD MD            SYSTEM ID:  [id number]      CT Head w/o Contrast    (Results Pending)      Reviewed emergency department encounter and neurosurgery admission on 1/29/2024 for infected shunt.  Reviewed associated labs including CBC, metabolic panel, CRP, sed rate, and CT imaging.  Reviewed [facility] emergency department encounter this on 9/12/2023, 9/13/2023, and 9/1923 for headache.  Reviewed CT imaging    Critical care was not performed.     Medical Decision Making  The patient's presentation was of moderate complexity (an undiagnosed new problem with uncertain prognosis).    The patient's evaluation involved:  review of external note(s) from 3+ sources (see separate area of note for details)  review of 3+ test result(s) ordered prior to this encounter (see separate area of note for details)  ordering and/or review of 3+ test(s) in this encounter (see separate area of note for details)  discussion of management or test interpretation with another health professional  (neurosurgery and ophthalmology)    The patient's management necessitated moderate risk (a decision regarding minor procedure ( shunt valve adjustment by neurosurgery) with risk factors of none).    Assessment & Plan    This is a 21 year-old female with headaches and lightheadedness. Experiencing increased eye pressure and visual changes. Pupils reactive to light. Headache and lightheadedness improved with lying down. Tenderness to neck and spine. Labs show CBC, INR, BMP, glucose, erythrocyte sedimentation rate wnl. UA with few bacteria, protein albumin urine of 10, overall unconcerning. CRP elevated to 7.25. Imaging shows shunt series with intact  shunt with no evidence of discontinuity or kinking of the shunt catheter. Head CT shows decreased caliber of the shunted ventricular system compared to 2/21/2024 with shunt catheter tip in stable position.  No evidence for papilledema on ophthalmologic imaging/evaluation in the eye clinic.    Concern for shunt malfunction over decompression. Consulted neurosurgery and Ophthalmology (r/o papilledema). I have reviewed the nursing notes. I have reviewed the findings, diagnosis, plan and need for follow up with the patient.    --    ED Attending Physician Attestation    I ИВАН WOOD MD, MD, cared for this patient with the Medical Student. I performed, or re-performed, the physical exam and medical decision-making. I have verified the accuracy of all the medical student findings and documentation above, and have edited as necessary.    Summary of HPI, PE, ED Course   Patient is a 24 year old female evaluated in the emergency department for headache. Exam and ED course notable for normal neurologic examination.  CT imaging reveals slitlike ventricles.  Patient seen by ophthalmology and taken to eye clinic for detailed evaluation for papilledema which was not found.  Neurosurgery adjusted patient's shunt valve.  She was treated with IV fluids, Toradol, and Reglan.  She has  improved and will be discharged to home.  She has neurosurgery follow-up planned. After the completion of care in the emergency department, the patient was discharged.      MILO WOOD MD, MD  Emergency Medicine      Discharge Medication List as of 10/4/2024  3:43 PM          Final diagnoses:   S/P  shunt   Nonintractable headache, unspecified chronicity pattern, unspecified headache type     Federica Seo, MS4    Chart documentation was completed with Dragon voice-recognition software. Even though reviewed, this chart may still contain some grammatical, spelling, and word errors.     Milo Wood Md  [facility] EMERGENCY DEPARTMENT  10/4/2024     Milo Wood MD  10/05/24 0815

## 2024-10-04 NOTE — ED TRIAGE NOTES
"Triage Assessment & Note:    /71   Pulse 108   Temp 98.1  F (36.7  C) (Oral)   Resp 16   Ht 1.651 m (5' 5\")   Wt 114.6 kg (252 lb 9.6 oz)   LMP 09/28/2024   SpO2 98%   BMI 42.03 kg/m        Patient presents with: Pt with shunt comes to triage with increase pain/ dizziness/ headaches. No reports of fever, cough, SOB, CP, or travel.     Home Treatments/Remedies: Home medications    Febrile / Afebrile: afebrile    Duration of C/o: 1 wk    Kay Salgado RN  October 4, 2024            "

## 2024-10-04 NOTE — CONSULTS
"[facility]       NEUROSURGERY CONSULTATION NOTE    This consultation was requested by Dr. Hagan from the Emergency Department service.        Reason for Consultation - headaches and nausea history of IIH and  shunt    HPI:  Leigh Sloan is a 24 year old female with history of idiopathic intracranial hypertension with  shunt placed in 2020 s/p revision 1/22/2024 for shunt malfunction, subsequent removal for positive intra-operative cultures 1/29/24, and  shunt replacement 2/8/24, now with Certas Valve set at 4. She presents to the [facility] today with 1 week of headache and dizziness. She describes pain and pressure of forehead, wrapping to back of head and neck, worsened with standing, walking, or effort exertion. She reports dizziness as feeling \"floaty\". She endorses photophobia, nausea, and pulsatile tinnitis, which is close to her baseline. She denies any fevers or chills.       PAST MEDICAL HISTORY:   Past Medical History:   Diagnosis Date    Acid reflux     Anxiety     Asthma     Depression     High cholesterol     IIH (idiopathic intracranial hypertension)     Non-alcoholic fatty liver disease     PCOS (polycystic ovarian syndrome)     PTSD (post-traumatic stress disorder)     Scoliosis     Tachycardia        PAST SURGICAL HISTORY:   Past Surgical History:   Procedure Laterality Date    CV HEART CATHETERIZATION WITH POSSIBLE INTERVENTION      IMPLANT SHUNT VENTRICULOPERITONEAL      IR LUMBAR DRAIN PLACEMENT W FLUORO  01/30/2024    LAPAROSCOPIC ASSISTED IMPLANT SHUNT VENTRICULOPERITONEAL N/A 2/8/2024    Procedure: Laparoscopic Assisted Implant Ventriculoperitoneal Shunt;  Surgeon: Abraham Zepeda MD;  Location: [facility] OR    OPTICAL TRACKING SYSTEM IMPLANT SHUNT VENTRICULOPERITONEAL Right 01/22/2024    Procedure: Stealth guided right sided ventriculoperitoneal shunt exploration and replacement of right sided proximal ventricular catheter and shunt valve;  " Surgeon: Calvin Cabrera MD;  Location: [facility] OR    OPTICAL TRACKING SYSTEM IMPLANT SHUNT VENTRICULOPERITONEAL Right 2/8/2024    Procedure: Stealth assisted RIGHT Implant Ventriculoperitoneal Shunt and Lumbar drain removal;  Surgeon: Calvin Cabrera MD;  Location: [facility] OR    PICC DOUBLE LUMEN PLACEMENT Left 01/31/2024    left basilic 4 fr dl power picc 48 cm    REMOVE SHUNT VENTRICULOPERITONEAL Right 1/29/2024    Procedure: REMOVAL, SHUNT, VENTRICULOPERITONEAL;  Surgeon: Calvin Cabrera MD;  Location: [facility] OR       FAMILY HISTORY:   Family History   Problem Relation Age of Onset    Anxiety Disorder Mother     Ataxia Mother     Depression Mother     Diabetes Mother     Fibromyalgia Mother     Hypertension Mother     Anxiety Disorder Father     Depression Father     Diabetes Father     Anxiety Disorder Sister     Bipolar Disorder Sister     Fibromyalgia Sister     Breast Cancer Paternal Grandmother     Skin Cancer Paternal Grandmother     Colon Cancer Paternal Grandfather     Nystagmus No family hx of     Strabismus No family hx of     Amblyopia No family hx of     Glasses (<9 y/o) No family hx of        SOCIAL HISTORY:   Social History     Tobacco Use    Smoking status: Never    Smokeless tobacco: Never   Substance Use Topics    Alcohol use: Not Currently       MEDICATIONS:  Current Outpatient Medications   Medication Sig Dispense Refill    albuterol (PROAIR HFA) 108 (90 Base) MCG/ACT inhaler INHALE 2 PUFFS BY MOUTH FOUR TIMES A DAY AS NEEDED      budesonide-formoterol (SYMBICORT) 80-4.5 MCG/ACT Inhaler Inhale 2 puffs into the lungs as needed      cetirizine (ZYRTEC) 10 MG tablet Take 10 mg by mouth as needed      cyclobenzaprine (FLEXERIL) 10 MG tablet Take 10 mg by mouth 3 times daily as needed      drospirenone-ethinyl estradiol (SAMMY) 3-0.02 MG tablet Take 1 tablet by mouth at bedtime      hydrOXYzine (VISTARIL) 25 MG capsule Take 25 mg by mouth as needed      meclizine (ANTIVERT) 25 MG tablet  "Take 12.5-25 mg by mouth 3 times daily as needed      Melatonin 10 MG CHEW Take 10 mg by mouth      meloxicam (MOBIC) 15 MG tablet Take 1 tablet (15 mg) by mouth at bedtime      metoprolol succinate ER (TOPROL XL) 50 MG 24 hr tablet Take 1 tablet by mouth at bedtime      omeprazole (PRILOSEC) 20 MG DR capsule Take 20 mg by mouth at bedtime      polyethylene glycol (MIRALAX) 17 GM/Dose powder Take 17 g by mouth daily 510 g 0    polyethylene glycol (MIRALAX) 17 GM/Dose powder Take 17 g by mouth daily 510 g 0    prazosin (MINIPRESS) 2 MG capsule Take 1 capsule by mouth at bedtime      QUEtiapine Fumarate 150 MG TABS Take 1 tablet by mouth at bedtime      Respiratory Therapy Supplies (ACE AEROSOL CLOUD ENHANCER) MISC See Admin Instructions      senna-docusate (SENOKOT-S/PERICOLACE) 8.6-50 MG tablet Take 1 tablet by mouth 2 times daily 30 tablet 0    sertraline (ZOLOFT) 25 MG tablet Take 75 mg by mouth at bedtime         Allergies:  Allergies   Allergen Reactions    Diamox [Acetazolamide] Hives    Sulfa Antibiotics Unknown    Sulfamethoxazole-Trimethoprim Nausea and Nausea and Vomiting    Trimethoprim Rash       ROS: 10 point ROS of systems including Constitutional, Eyes, Respiratory, Cardiovascular, Gastroenterology, Genitourinary, Integumentary, Muscularskeletal, Psychiatric were all negative except for pertinent positives noted in my HPI.    Physical exam:   Blood pressure 105/71, pulse 108, temperature 98.1  F (36.7  C), temperature source Oral, resp. rate 16, height 1.651 m (5' 5\"), weight 114.6 kg (252 lb 9.6 oz), last menstrual period 09/28/2024, SpO2 98%, not currently breastfeeding.  General: awake and alert  HEENT: normocephalic, atraumatic  PULM: breathing comfortably on room air  MSK: moves all extremities spontaneously  NEUROLOGIC:  -- Awake; Alert; oriented x 3  -- Follows commands briskly  -- Speech fluent, spontaneous. No aphasia or dysarthria.  -- no gaze preference. No apparent hemineglect.  Cranial " Nerves:  -- extraocular movements intact  -- face symmetrical, tongue midline  -- sensory V1-V3 intact bilaterally  -- hearing grossly intact bilat  -- Trapezii 5/5 strength bilat symmetric    Motor:  Normal bulk / tone; no tremor, rigidity, or bradykinesia.  No muscle wasting or fasciculations  No Pronator Drift     Delt Bi Tri Hand Flexion/  Extension Iliopsoas Quadriceps Hamstrings Tibialis Anterior Gastroc    C5 C6 C7 C8/T1 L2 L3 L4-S1 L4 S1   R 5 5 5 5 5 5 5 5 5   L 5 5 5 5 5 5 5 5 5     Sensory:  intact to LT x 4 extremities         IMAGING:  Narrative & Impression   EXAM: CT HEAD W/O CONTRAST  10/4/2024 9:36 AM      COMPARISON:  Head CT 2/21/2024, same day x-ray shunt series     FINDINGS:  Stable right frontal approach ventriculoperitoneal shunt catheter with  tip terminating near the foramen of Hernandez. Decreased caliber of the  ventricular system compared to 2/21/2024 with slitlike appearance of  the lateral ventricles and near complete effacement of the third  ventricle. Dural venous sinuses appear unremarkable. No acute  intracranial hemorrhage, mass effect, or midline shift. No acute loss  of gray-white matter differentiation in the cerebral hemispheres.  Clear basal cisterns.     No acute osseous findings. The visualized portions of the paranasal  sinuses and mastoid air cells are clear. Grossly normal orbits.                                                                       IMPRESSION: Decreased caliber of the shunted ventricular system  compared to 2/21/2024 with right frontal approach ventriculoperitoneal  shunt catheter tip in stable position near the foramen of Hernandez.          Narrative & Impression   EXAM: Shunt malfunction survey.     COMPARISON: 10/4/2024 CT head     FINDINGS:   There is a ventriculoperitoneal shunt in the skull that appears  intact. The shunt in the neck appears intact.     In the thorax, the shunt appears intact. Cardiac silhouette appears  well-defined. Pulmonary  vasculature appears distinct. No acute  airspace opacity. Low lung volumes are likely secondary to incomplete  inspiration.     In the abdomen, the course of the shunt is intact without a focal  break.  The distal tip terminates in the right hemiabdomen.     Bowel gas pattern is within normal limits.                                                                       IMPRESSION: Intact ventriculoperitoneal shunt . No definite evidence  of discontinuity or kinking of the shunt catheter.        LABS:   Last Comprehensive Metabolic Panel:  Lab Results   Component Value Date     02/09/2024    POTASSIUM 3.9 02/09/2024    CHLORIDE 103 02/09/2024    CO2 22 02/09/2024    ANIONGAP 14 02/09/2024     (H) 02/09/2024    BUN 10.0 02/09/2024    CR 0.67 02/09/2024    GFRESTIMATED >90 02/09/2024    KEV 9.1 02/09/2024         Lab Results   Component Value Date    WBC 10.2 02/09/2024     Lab Results   Component Value Date    RBC 3.85 02/09/2024     Lab Results   Component Value Date    HGB 10.2 02/09/2024     Lab Results   Component Value Date    HCT 31.4 02/09/2024     Lab Results   Component Value Date    MCV 82 02/09/2024     Lab Results   Component Value Date    MCH 26.5 02/09/2024     Lab Results   Component Value Date    MCHC 32.5 02/09/2024     Lab Results   Component Value Date    RDW 14.0 02/09/2024     Lab Results   Component Value Date     02/09/2024     INR   Date Value Ref Range Status   02/08/2024 1.00 0.85 - 1.15 Final      aPTT   Date Value Ref Range Status   02/08/2024 27 22 - 38 Seconds Final       ASSESSMENT:  Leigh Sloan is a 24 year old female with history of idiopathic intracranial hypertension s/p  shunt (Certas Valve at 4) last revision 2/8/24. She presents with 1 week of positional headaches and lightheadedness. XR shunt series revealed intact catheter. CT head revealed slit-like ventricles, decreased size from previous imaging. Ophthalmology's exam revealed mild bilateral optic nerve  edema, stable from previous examination. No significant optical nerve edema on imaging in ophthalmology clinic. Given these findings, it is suspected that patient's symptoms are secondary to overdrainage. Therefore,  shunt was adjusted to setting of 5, with plan to see patient in clinic to monitor symptoms.    Procedure:  With the patient's verbal permission, her shunt was interrogated and found to be set to 4. Rechecked x 2. Using the magnet, this was adjusted to setting of 5. Rechecked x 2. The patient tolerated this well.        RECOMMENDATIONS:  - No neurosurgical intervention indicated at this time   -  shunt adjusted from Certas 4 to setting of 5  - We will schedule follow up in 2 weeks with repeat head CT to be obtained prior to this appointment  - Appreciate ophthalmology recommendations  - Ok for discharge from Neurosurgical perspective          Yesica Stevens PA-C  Neurosurgery Department  Pager: [phone]      The patient was discussed with Dr. Katrin Tam, neurosurgery chief resident, and Dr. Calvin Cabrrea, neurosurgery staff.

## 2024-10-07 ENCOUNTER — TELEPHONE (OUTPATIENT)
Dept: NEUROSURGERY | Facility: CLINIC | Age: 24
End: 2024-10-07
Payer: COMMERCIAL

## 2024-10-16 ENCOUNTER — ANCILLARY PROCEDURE (OUTPATIENT)
Dept: CT IMAGING | Facility: CLINIC | Age: 24
End: 2024-10-16
Payer: COMMERCIAL

## 2024-10-16 ENCOUNTER — OFFICE VISIT (OUTPATIENT)
Dept: NEUROSURGERY | Facility: CLINIC | Age: 24
End: 2024-10-16
Payer: COMMERCIAL

## 2024-10-16 VITALS
HEART RATE: 98 BPM | OXYGEN SATURATION: 97 % | RESPIRATION RATE: 16 BRPM | SYSTOLIC BLOOD PRESSURE: 107 MMHG | DIASTOLIC BLOOD PRESSURE: 66 MMHG

## 2024-10-16 DIAGNOSIS — G93.2 IIH (IDIOPATHIC INTRACRANIAL HYPERTENSION): Primary | ICD-10-CM

## 2024-10-16 DIAGNOSIS — Z98.2 S/P VP SHUNT: ICD-10-CM

## 2024-10-16 DIAGNOSIS — G93.2 IIH (IDIOPATHIC INTRACRANIAL HYPERTENSION): ICD-10-CM

## 2024-10-16 PROCEDURE — 70450 CT HEAD/BRAIN W/O DYE: CPT | Performed by: RADIOLOGY

## 2024-10-16 PROCEDURE — 99213 OFFICE O/P EST LOW 20 MIN: CPT | Performed by: PHYSICIAN ASSISTANT

## 2024-10-16 NOTE — PROGRESS NOTES
[facility]  Department of Neurosurgery  [facility]    Name: Leigh Sloan  MRN: [id number]  Age: 24 year old  : 2000  Referring provider: Calvin Cabrera  10/16/2024      Chief Complaint:   Idiopathic intracranial hypertension s/p right occipital  shunt placement  (Hakim, placed in [place]) with recurrent papilledema and shunt malfunction s/p right occipital  shunt revision 2024 c/b positive intraoperative cultures s/p shunt removal 2024 and redo  shunt placement 2024 (Certas at 5), post hospital follow up visit     History of Present Illness:   Leigh Sloan is a 24 year old female with a history of idiopathic intracranial hypertension who is seen today for a post hospital follow up. Please see previous note for detailed history. She recently presented to [facility] Emergency Department with new positional headaches and dizziness, with imaging revealing slit like ventricles. Her in hospital Ophthalmology exam was stable and  shunt was dialed up to 5. Today she reports that her headaches have reesolved. She does not feel dizzy. She has some mild pain about her eyes bilaterally, worse with bright lights. She denies blurry vision.     Review of Systems:   Pertinent items are noted in HPI or as in patient entered ROS below, remainder of complete ROS is negative.     Physical Exam:   /66 (BP Location: Right arm, Patient Position: Sitting)   Pulse 98   Resp 16   LMP 2024   SpO2 97%    General: No acute distress.    Eyes: Conjunctivae are normal.  MSK: Moves all extremities.  No obvious deformity.  Neuro: The patient is fully oriented. Speech is normal. Facial nerve function is normal, rated as a House Brackmann 1. Gait is normal.   Psych: Normal mood and affect. Behavior is normal.    Incision:  shunt incision is well healed.    Procedure:  With the patient's verbal permission, her Certas valve was  interrogated and found to be set to 5. Rechecked x 2.    Imaging:  We reviewed the CT head done today which reveals stable shunt tube position with stable slitlike ventricles. No acute change from previous.      Assessment:  Idiopathic intracranial hypertension s/p right occipital  shunt placement 2020 (Harichard, placed in [place]) with recurrent papilledema and shunt malfunction s/p right occipital  shunt revision 1/22/2024 c/b positive intraoperative cultures s/p shunt removal 1/29/2024 and redo  shunt placement 2/08/2024 (Certas at 5), post hospital follow up visit     Plan:  We reviewed the head CT today in clinic which appears reasonably stable. She has upcoming NeuroOphthalmology exam with Dr. May on 10/25. I'd like to see her back closely in 4-6 weeks to check in, and certainly can see her back sooner if needed after her eye exam.   She was encouraged to reach out with concerns in the meantime.        Dolly Garcia PA-C  Department of Neurosurgery

## 2024-10-17 ENCOUNTER — TELEPHONE (OUTPATIENT)
Dept: NEUROSURGERY | Facility: CLINIC | Age: 24
End: 2024-10-17
Payer: COMMERCIAL

## 2024-10-17 NOTE — TELEPHONE ENCOUNTER
Patient confirmed scheduled appointment:  Date: 11/22/24  Time: 3:00 pm  Visit type: Shunt Check  Provider: Dolly Garcia  Location: [facility]  Testing/imaging: NA  Additional notes: IIH and  Shunt     Venita Jaimes on 10/17/2024 at 8:26 AM

## 2024-11-05 DIAGNOSIS — H53.40 VISUAL FIELD DEFECT: Primary | ICD-10-CM

## 2024-11-07 ENCOUNTER — OFFICE VISIT (OUTPATIENT)
Dept: OPHTHALMOLOGY | Facility: CLINIC | Age: 24
End: 2024-11-07
Attending: OPHTHALMOLOGY
Payer: COMMERCIAL

## 2024-11-07 DIAGNOSIS — H47.393 OTHER DISORDERS OF OPTIC DISC, BILATERAL: ICD-10-CM

## 2024-11-07 DIAGNOSIS — Z86.69 HISTORY OF IDIOPATHIC INTRACRANIAL HYPERTENSION: Primary | ICD-10-CM

## 2024-11-07 PROCEDURE — G0463 HOSPITAL OUTPT CLINIC VISIT: HCPCS | Performed by: OPHTHALMOLOGY

## 2024-11-07 PROCEDURE — 92014 COMPRE OPH EXAM EST PT 1/>: CPT | Performed by: OPHTHALMOLOGY

## 2024-11-07 PROCEDURE — 92133 CPTRZD OPH DX IMG PST SGM ON: CPT | Performed by: OPHTHALMOLOGY

## 2024-11-07 ASSESSMENT — VISUAL ACUITY
OS_CC+: -1
CORRECTION_TYPE: GLASSES
METHOD: SNELLEN - LINEAR
OD_CC: 20/20
OS_CC: 20/20

## 2024-11-07 ASSESSMENT — CUP TO DISC RATIO
OD_RATIO: 0.4
OS_RATIO: 0.4

## 2024-11-07 ASSESSMENT — TONOMETRY
IOP_METHOD: ICARE
OD_IOP_MMHG: 11
OS_IOP_MMHG: 09

## 2024-11-07 ASSESSMENT — SLIT LAMP EXAM - LIDS
COMMENTS: NORMAL
COMMENTS: NORMAL

## 2024-11-07 ASSESSMENT — CONF VISUAL FIELD
OS_SUPERIOR_TEMPORAL_RESTRICTION: 0
METHOD: COUNTING FINGERS
OS_NORMAL: 1
OD_SUPERIOR_NASAL_RESTRICTION: 0
OD_INFERIOR_NASAL_RESTRICTION: 0
OD_NORMAL: 1
OD_INFERIOR_TEMPORAL_RESTRICTION: 0
OD_SUPERIOR_TEMPORAL_RESTRICTION: 0
OS_SUPERIOR_NASAL_RESTRICTION: 0
OS_INFERIOR_TEMPORAL_RESTRICTION: 0
OS_INFERIOR_NASAL_RESTRICTION: 0

## 2024-11-07 ASSESSMENT — REFRACTION_WEARINGRX
OD_CYLINDER: SPHERE
OS_AXIS: 155
SPECS_TYPE: SVL
OS_SPHERE: -5.00
OS_CYLINDER: +0.75
OD_SPHERE: -4.50

## 2024-11-07 ASSESSMENT — EXTERNAL EXAM - LEFT EYE: OS_EXAM: NORMAL

## 2024-11-07 ASSESSMENT — EXTERNAL EXAM - RIGHT EYE: OD_EXAM: NORMAL

## 2024-11-07 NOTE — PROGRESS NOTES
Leigh Sloan is a 24 year old female with the following diagnoses:   1. History of idiopathic intracranial hypertension    2. Other disorders of optic disc, bilateral       Leigh Sloan is a 24 year old female with the following diagnoses:   1. Optic disc edema    2. History of idiopathic intracranial hypertension      Patient follows for papilledema    HPI    Leigh Sloan has a history of IIH diagnosed March 2020. She reports that at that time she had an LP done in [place] at her opening pressure was [id number] (records not available). She was shunted emergently the next day and she states that it was secondary to pain. We discussed her visual symptoms at that time and she reports that she had an eye exam prior to the LP which showed bilateral optic disc edema. She does not remember having been told that her visual acuity was decreased nor that she had constriction of visual fields. She never took diamox because she had a sulfa allergy as a child (fever and 1 week of hospitalization).      Her initial symptoms in 2020: pulsatile tinnitus in both ears. Debilitating headaches to the point where she couldn't move due to the pain. Does not remember any TVOs. No diplopia. Does not remember blurry vision.     About 2-3 months ago she started noticing right ear pulsatile tinnitus. Over the next month she started to notice bilateral pulsatile tinnitus. She also noticed headaches are worsening over the last 2-3 months (although much better than 2020). Headaches are occipital/parietal and bilateral. They last about 5 minutes and are a pressure or a sharp sensation. She denies light/sound sensitivity, nausea, and vomiting specific to the headaches. She does have light sensitivity all the time which has been most of her life. She has been noticing blackouts of her vision if she stands too quickly or strains - this lasts less than a minute and is bilateral.     She was evaluated in the ED 9/12/23 and had  CT head without ventricular dilation. LP showed opening pressure of 25 cm H2O in the ED (on left side with knees bent and curled into chest). She had XR shunt series performed which showed no kinking or discontinuity of the tubing and valve set to [id number].     After the LP she noted about 1 day of symptomatic improvement but then had pain with upright positioning and required a blood patch.       Since office last visit on 10/23/2023, patient reports recent visit to the ED on 10/04/2024.   ED Note (10/04/24) Dr. Garg:  Associated symptoms include Negative for double vision, eye pain and headache. Additional comments: Was in ED on 10/4 because she was having eye pain, dizziness, pressure, and blacking out of vision. They changed the setting in her shunt and she is doing much better now. She still notes some pressure in eyes but believes this is due to a changed setting.     Today (11/7/2024)  Patient described her eyes feeling sore. She said that she feels like there has been slight improvement since her ED visit. Patient denied headache, blurry vision and double vision, but said that she has some constant pressure-like pain (3/10) in the eyes. When looking to the side, she has some sharper pain (5/10), which is new. She feels it may be from the new shunt setting. She described photophobia, which she feels has slightly worsened. Patient said she has no new visual concerns. She said that she has a new onset Vit. D deficiency, which she is now taking supplements for. She was also diagnosed with Postural Orthostatic Tachycardia Syndrome in July 2024 and has been prescribed Metoprolol. She said when she has an flair of POTS that she loses vision and has tinnitus. She said it takes about a minute for her vision to recover. She has no recent weight gain. No recent use of tetracyclines. No vitamin A-derived medication      Outside appointments   ED (10/4/2024)  Assessment & Plan  This is a 21 year-old female with  headaches and lightheadedness. Experiencing increased eye pressure and visual changes. Pupils reactive to light. Headache and lightheadedness improved with lying down. Tenderness to neck and spine. Labs show CBC, INR, BMP, glucose, erythrocyte sedimentation rate wnl. UA with few bacteria, protein albumin urine of 10, overall unconcerning. CRP elevated to 7.25. Imaging shows shunt series with intact  shunt with no evidence of discontinuity or kinking of the shunt catheter. Head CT shows decreased caliber of the shunted ventricular system compared to 2/21/2024 with shunt catheter tip in stable position.  No evidence for papilledema on ophthalmologic imaging/evaluation in the eye clinic.     Concern for shunt malfunction over decompression. Consulted neurosurgery and Ophthalmology (r/o papilledema). I have reviewed the nursing notes. I have reviewed the findings, diagnosis, plan and need for follow up with the patient    Neurosurgery (10/16/24)  Imaging:  We reviewed the CT head done today which reveals stable shunt tube position with stable slitlike ventricles. No acute change from previous.     Assessment:  Idiopathic intracranial hypertension s/p right occipital  shunt placement 2020 (Marcia, placed in [place]) with recurrent papilledema and shunt malfunction s/p right occipital  shunt revision 1/22/2024 c/b positive intraoperative cultures s/p shunt removal 1/29/2024 and redo  shunt placement 2/08/2024 (Certas at 5), post hospital follow up visit      Plan:  We reviewed the head CT today in clinic which appears reasonably stable. She has upcoming NeuroOphthalmology exam with Dr. May on 10/25. I'd like to see her back closely in 4-6 weeks to check in, and certainly can see her back sooner if needed after her eye exam.   She was encouraged to reach out with concerns in the meantime.     Exam:  Visual acuity 20/20 right eye 20/20 left eye.  Color vision 11/11 right eye and 11/11 left eye.  Pupils  dilated  Intraocular pressure 11 right eye and 09 left eye.  Anterior segment exam     OCT Optic Nerve RNFL Spectralis OU (both eyes)          Performed by: EG   .     Right Eye  Reliability of the test: Good   . Test Findings: Normal   . Interpretation: Normal   . Plan: Monitor   . Interval: Same   .     Left Eye  Reliability of the test: Good   . Test Findings: Normal   . Interpretation: Normal   . Plan: Monitor   . Interval: Same   .              It is my impression that patient has Idiopathic Intracranial Hypertension (IIH) status-post ventriculoperitoneal shunt.  She is not on diamox at this time. She does not have papilledema today.  Reassurance was given.          Attending Physician Attestation:  Complete documentation of historical and exam elements from today's encounter can be found in the full encounter summary report (not reduplicated in this progress note).  I personally obtained the chief complaint(s) and history of present illness.  I confirmed and edited as necessary the review of systems, past medical/surgical history, family history, social history, and examination findings as documented by others; and I examined the patient myself.  I personally reviewed the relevant tests, images, and reports as documented above.  I formulated and edited as necessary the assessment and plan and discussed the findings and management plan with the patient and family. I was present with the medical student who participated in the service and in the documentation of this note. - MD Chas Barton, MS4

## 2024-11-13 NOTE — TELEPHONE ENCOUNTER
Action 2024 2:04 PM Mica    Action Taken Faxed a request to [facility] for image to be push     2024 2:10 PM - Received image in pacs and attached it to patient- Mica        RECORDS RECEIVED FROM: Internal   REASON FOR VISIT: Scheduled per patient, referred by Saravanan Patterson MD/  Idiopathic intracranial hypertension [G93.2]  History of atypical migraine [Z86.69], new patient, records in [facility] Dr. Terrell or Dr. Amor    PROVIDER: Court Terrell   DATE OF APPT: 25   NOTES (FOR ALL VISITS) STATUS DETAILS   OFFICE NOTE from referring provider Internal 10/16/24, 24, 24 - OV Dolly Moreira PA-C @ [facility]   OFFICE NOTE from other specialist Internal and Care everywhere  24, 10/4/24 - OV Carlos Simmons MD @ [facility]      [facility]:  24, 23, 23 - Ov w. O'Donnell, Kevin Merlin, MD      [facility]:  23- OV Dominga Anthony, FRANCISCO    DISCHARGE SUMMARY from hospital Internal 24- Hospital Visit  24- Hospital visit Calvin Goss MD @ [facility]     DISCHARGE REPORT from the ER Internal and Care Every where  10/4/24- ED Milo Abraham MD  @ [facility]    [facility]:  23, 23, 23 - ED     [facility]:  23- ED    OPERATIVE REPORT Internal 24- Removal, Shunt, Ventriculop   MEDICATION LIST Internal    IMAGING  (FOR ALL VISITS)     LUMBAR PUNCTURE Internal [facility] Imagin24- Lumbar    CT (HEAD, NECK, SPINE) Internal and Care Everywhere  [facility]:  10/16/24- 1/3/24 - CT Head     [facility]:  24- CT Head -- pending -- PACS  23- CT Head Brain     [facility]:  23- CT Head

## 2024-12-15 ENCOUNTER — HEALTH MAINTENANCE LETTER (OUTPATIENT)
Age: 24
End: 2024-12-15

## 2025-01-22 ENCOUNTER — VIRTUAL VISIT (OUTPATIENT)
Dept: NEUROLOGY | Facility: CLINIC | Age: 25
End: 2025-01-22
Attending: STUDENT IN AN ORGANIZED HEALTH CARE EDUCATION/TRAINING PROGRAM
Payer: MEDICAID

## 2025-01-22 ENCOUNTER — PRE VISIT (OUTPATIENT)
Dept: NEUROLOGY | Facility: CLINIC | Age: 25
End: 2025-01-22

## 2025-01-22 DIAGNOSIS — G43.109 MIGRAINE WITH AURA AND WITHOUT STATUS MIGRAINOSUS, NOT INTRACTABLE: Primary | ICD-10-CM

## 2025-01-22 DIAGNOSIS — Z86.69 HISTORY OF ATYPICAL MIGRAINE: ICD-10-CM

## 2025-01-22 DIAGNOSIS — G93.2 IDIOPATHIC INTRACRANIAL HYPERTENSION: ICD-10-CM

## 2025-01-22 RX ORDER — SUMATRIPTAN SUCCINATE 100 MG/1
100 TABLET ORAL
Qty: 18 TABLET | Refills: 11 | Status: SHIPPED | OUTPATIENT
Start: 2025-01-22

## 2025-01-22 RX ORDER — NAPROXEN 500 MG/1
500 TABLET ORAL 2 TIMES DAILY PRN
Qty: 28 TABLET | Refills: 11 | Status: SHIPPED | OUTPATIENT
Start: 2025-01-22

## 2025-01-22 RX ORDER — TOPIRAMATE 25 MG/1
100 TABLET, FILM COATED ORAL EVERY EVENING
Qty: 120 TABLET | Refills: 5 | Status: SHIPPED | OUTPATIENT
Start: 2025-01-22

## 2025-01-22 ASSESSMENT — MIGRAINE DISABILITY ASSESSMENT (MIDAS)
HOW MANY DAYS IN THE PAST 3 MONTHS HAVE YOU HAD A HEADACHE: 30
HOW OFTEN WERE SOCIAL ACTIVITIES MISSED DUE TO HEADACHES: 0
HOW MANY DAYS WAS HOUSEWORK PRODUCTIVITY CUT IN HALF DUE TO HEADACHES: 20
ON A SCALE FROM 0-10 ON AVERAGE HOW PAINFUL WERE HEADACHES: 5
TOTAL SCORE: 40
HOW MANY DAYS DID YOU NOT DO HOUSEWORK BECAUSE OF HEADACHES: 20
HOW MANY DAYS WAS YOUR PRODUCTIVITY CUT IN HALF BECAUSE OF HEADACHES: 0
HOW MANY DAYS DID YOU MISS WORK OR SCHOOL BECAUSE OF HEADACHES: 0

## 2025-01-22 ASSESSMENT — HEADACHE IMPACT TEST (HIT 6)
WHEN YOU HAVE A HEADACHE HOW OFTEN DO YOU WISH YOU COULD LIE DOWN: ALWAYS
HOW OFTEN DID HEADACHS LIMIT CONCENTRATION ON WORK OR DAILY ACTIVITY: VERY OFTEN
HOW OFTEN DO HEADACHES LIMIT YOUR DAILY ACTIVITIES: SOMETIMES
WHEN YOU HAVE HEADACHES HOW OFTEN IS THE PAIN SEVERE: SOMETIMES
HIT6 TOTAL SCORE: 65
HOW OFTEN HAVE YOU FELT TOO TIRED TO WORK BECAUSE OF YOUR HEADACHES: SOMETIMES
HOW OFTEN HAVE YOU FELT FED UP OR IRRITATED BECAUSE OF YOUR HEADACHES: VERY OFTEN

## 2025-01-22 NOTE — NURSING NOTE
Current patient location: [address] [id number]    Is the patient currently in the state of [place]? YES    Visit mode: VIDEO    If the visit is dropped, the patient can be reconnected by:TELEPHONE VISIT: Phone number:   Telephone Information:   Mobile [phone]       Will anyone else be joining the visit? NO  (If patient encounters technical issues they should call [phone]    Are changes needed to the allergy or medication list? Pt stated no med changes    Are refills needed on medications prescribed by this physician? NO    Rooming Documentation:  Questionnaire(s) completed    Reason for visit: Headache    Ofelia SCHAEFERF

## 2025-01-22 NOTE — LETTER
1/22/2025       RE: Leigh Sloan  [address]  [facility] [address]     Dear Colleague,    Thank you for referring your patient, Leigh Sloan, to the [facility] [place] at [facility]. Please see a copy of my visit note below.    Virtual Visit Details    Type of service:  Video Visit   Video Start Time: 1:44 PM  Video End Time: 2:28 PM    Originating Location (pt. Location): Home    Distant Location (provider location):  Off-site  Platform used for Video Visit: [facility]   Virtual Headache Neurology Consult  January 22, 2025     Leigh Sloan MRN# [id number]   YOB: 2000 Age: 24 year old     Requesting physician: Saravanan Patterson MD         Assessment and Recommendations:     Leigh Sloan is a 24 year old female who presents for further evaluation of headache.    Her headache presentation meets criteria for:  Chronic migraine with visual aura    This is complicated by history of IIH, treated with  shunt, POTS, anxiety/depression, chronic pain.    Virtual neurologic examination is intact today, although she does report discomfort with eye movement and head movements.    Most recent head imaging was completed October 2024, CT of the head, which did not show any structural pathology to explain headache.  There is a  shunt in place, she does have some associated discomfort around the shunt.    Today, reviewed chronic migraine with visual aura, current plan for IIH, and I recommended additional symptomatic management of chronic migraine.    For acute treatment, I recommend the following:  - For acute treatment of mild headache, I recommend naproxen 500 mg twice daily as needed, not to exceed more than 14 days/month to avoid medication overuse.  - For acute treatment of moderate to severe headache, I recommend sumatriptan 100 mg at the onset of headache, with a repeat dose in 2  hours if needed.  This should not exceed more than 9 days/month to avoid medication overuse.  - For headache related nausea, she will continue ondansetron as needed.    Her headache frequency and severity warrant prevention.  She currently takes metoprolol, so additional beta blockers will be avoided.    -Tricyclic antidepressants were considered, but will be avoided at this time due to concurrent use of other mental health medications.  She currently takes sertraline, quetiapine, and recently started a stimulant.  -I recommended trial of topiramate starting at 25 mg nightly, titrating up by 25 mg each week, as needed and tolerated, to a goal dose of 100 mg nightly.  I recommend a 3-month trial prior to determining effectiveness.  Potential side effects were discussed.  - If topiramate is not tolerated or not effective after an adequate trial, CGRP inhibitor, botulinum toxin injections could be considered.    The longitudinal plan of care for Leigh was addressed during this visit. Due to the added complexity in care, I will continue to support Leigh in the subsequent management of this condition(s) and with the ongoing continuity of care of this condition(s).  I will plan to see her back in 3 months to monitor her progress.    Court Terrell MD  Neurology            Chief Complaint:     Chief Complaint   Patient presents with     Headache           History is obtained from the patient and medical record.  Patient was seen via a virtual visit in their home.      Leigh Sloan is a 24 year old female who has been living with headaches since 2014. She reports headache, migraine, photophobia, vomiting. This is variable in frequency and severity. In the last three months, she reports more symptoms in light.    Headaches are associated with vomiting and visual symptoms, usually.    Headache is pressure/sharp around shunt, and sharp over either side of head, around her eyes, base of head and neck. Pain rates up to  8/10 on a bad pain day. Lasts a couple days on average.  She reports 23/30 headache days per month, with 5/30 severe headache days per month.    Associated with photophobia, osmophobia, nausea, vomiting. She gets motion sick easily.    Has had  shunt, and related surgeries.  Describes blood flowing sound in head, worsened headache, blacking out of vision. Papilledema found in 2020, a few months later, and shunt placed. Shunt replaced in January and February 2024 after shunt was infected and stopped working.  -Sees neurosurgery, Dolly Garcia, for adjustments; wonders if pressure is too low.   -Sees ophthalmology Dr. Brand for eye exams. UTD.    Also gets visual symptoms: transparent dark dots, rainbow with oily texture, or random sparkly dots. They last a day to a few weeks.     Sometimes can't see briefly upon standing. Wonders if related to POTS. Can be unsteady when walking.     For treatment, she takes ondansetron as needed for nausea.   Takes Advil or Aleve as needed, which help somewhat.    She denies previous migraine specific medication.    Allergic to Diamox.            Past Medical History:     Past Medical History:   Diagnosis Date     Acid reflux      Anxiety      Asthma      Depression      High cholesterol      IIH (idiopathic intracranial hypertension)      Non-alcoholic fatty liver disease      PCOS (polycystic ovarian syndrome)      PTSD (post-traumatic stress disorder)      Scoliosis      Tachycardia               Past Surgical History:     Past Surgical History:   Procedure Laterality Date     CV HEART CATHETERIZATION WITH POSSIBLE INTERVENTION       IMPLANT SHUNT VENTRICULOPERITONEAL       IR LUMBAR DRAIN PLACEMENT W FLUORO  01/30/2024     LAPAROSCOPIC ASSISTED IMPLANT SHUNT VENTRICULOPERITONEAL N/A 2/8/2024    Procedure: Laparoscopic Assisted Implant Ventriculoperitoneal Shunt;  Surgeon: Abraham Zepeda MD;  Location: [facility] OR     [email or web address] SYSTEM IMPLANT SHUNT VENTRICULOPERITONEAL Right  01/22/2024    Procedure: Stealth guided right sided ventriculoperitoneal shunt exploration and replacement of right sided proximal ventricular catheter and shunt valve;  Surgeon: Calvin Cabrera MD;  Location: [facility] OR     OPTICAL TRACKING SYSTEM IMPLANT SHUNT VENTRICULOPERITONEAL Right 2/8/2024    Procedure: Stealth assisted RIGHT Implant Ventriculoperitoneal Shunt and Lumbar drain removal;  Surgeon: Calvin Cabrera MD;  Location: [facility] OR     PICC DOUBLE LUMEN PLACEMENT Left 01/31/2024    left basilic 4 fr dl power picc 48 cm     REMOVE SHUNT VENTRICULOPERITONEAL Right 1/29/2024    Procedure: REMOVAL, SHUNT, VENTRICULOPERITONEAL;  Surgeon: Calvin Cabrera MD;  Location: [facility] OR             Social History:   She is not currently working due to disability.    She drinks caffeine infrequently at low amounts.    Social History     Socioeconomic History     Marital status: Single     Spouse name: Not on file     Number of children: Not on file     Years of education: Not on file     Highest education level: Not on file   Occupational History     Not on file   Tobacco Use     Smoking status: Never     Smokeless tobacco: Never   Substance and Sexual Activity     Alcohol use: Not Currently     Drug use: Not Currently     Comment: Gummies on occasion     Sexual activity: Not on file   Other Topics Concern     Not on file   Social History Narrative     Not on file     Social Drivers of Health     Financial Resource Strain: Low Risk  (1/14/2025)    Received from [employer] [facility]    Financial Resource Strain      Difficulty of Paying Living Expenses: 3      Difficulty of Paying Living Expenses: Not on file   Food Insecurity: No Food Insecurity (1/14/2025)    Received from [employer] [facility]    Food Insecurity      Do you worry your food will run out before you are able to buy more?: 1   Transportation Needs: Unmet Transportation Needs (1/14/2025)     Received from [employer]    Transportation Needs      Does lack of transportation keep you from medical appointments?: 2      Does lack of transportation keep you from work, meetings or getting things that you need?: 2   Physical Activity: High Risk (8/4/2021)    Received from [employer]    Physical Activity      Do you exercise?: No      How many days do you exercise per week?: Not on file      How intense is your typical exercise?: Not on file   Stress: High Risk (8/4/2021)    Received from [employer]    Stress      How well do you handle stress in your life?: I often have problems coping   Social Connections: Socially Integrated (1/14/2025)    Received from [employer]    Social Connections      Do you often feel lonely or isolated from those around you?: 0   Interpersonal Safety: Not on file   Housing Stability: High Risk (1/14/2025)    Received from [employer]    Housing Stability      What is your housing situation today?: 3             Family History:     Dad with headaches and photophobia.    Family History   Problem Relation Age of Onset     Anxiety Disorder Mother      Ataxia Mother      Depression Mother      Diabetes Mother      Fibromyalgia Mother      Hypertension Mother      Anxiety Disorder Father      Depression Father      Diabetes Father      Anxiety Disorder Sister      Bipolar Disorder Sister      Fibromyalgia Sister      Breast Cancer Paternal Grandmother      Skin Cancer Paternal Grandmother      Colon Cancer Paternal Grandfather      Nystagmus No family hx of      Strabismus No family hx of      Amblyopia No family hx of      Glasses (<7 y/o) No family hx of              Allergies:      Allergies   Allergen Reactions     Diamox [Acetazolamide] Hives     Sulfa Antibiotics Unknown     Sulfamethoxazole-Trimethoprim Nausea and Nausea and Vomiting      Trimethoprim Rash             Medications:     Current Outpatient Medications:      albuterol (PROAIR HFA) 108 (90 Base) MCG/ACT inhaler, INHALE 2 PUFFS BY MOUTH FOUR TIMES A DAY AS NEEDED, Disp: , Rfl:      budesonide-formoterol (SYMBICORT) 80-4.5 MCG/ACT Inhaler, Inhale 2 puffs into the lungs as needed, Disp: , Rfl:      cetirizine (ZYRTEC) 10 MG tablet, Take 10 mg by mouth as needed, Disp: , Rfl:      cholecalciferol (VITAMIN D3) 125 mcg (5000 units) capsule, Take 125 mcg by mouth daily., Disp: , Rfl:      cyclobenzaprine (FLEXERIL) 10 MG tablet, Take 10 mg by mouth 3 times daily as needed, Disp: , Rfl:      drospirenone-ethinyl estradiol (SAMMY) 3-0.02 MG tablet, Take 1 tablet by mouth at bedtime, Disp: , Rfl:      hydrOXYzine (VISTARIL) 25 MG capsule, Take 25 mg by mouth as needed, Disp: , Rfl:      meclizine (ANTIVERT) 25 MG tablet, Take 12.5-25 mg by mouth 3 times daily as needed, Disp: , Rfl:      Melatonin 10 MG CHEW, Take 10 mg by mouth, Disp: , Rfl:      meloxicam (MOBIC) 15 MG tablet, Take 1 tablet (15 mg) by mouth at bedtime, Disp: , Rfl:      metoprolol succinate ER (TOPROL XL) 50 MG 24 hr tablet, Take 1 tablet by mouth at bedtime, Disp: , Rfl:      omeprazole (PRILOSEC) 20 MG DR capsule, Take 20 mg by mouth at bedtime, Disp: , Rfl:      prazosin (MINIPRESS) 2 MG capsule, Take 1 capsule by mouth at bedtime, Disp: , Rfl:      QUEtiapine Fumarate 150 MG TABS, Take 1 tablet by mouth at bedtime, Disp: , Rfl:      Respiratory Therapy Supplies (ACE AEROSOL CLOUD ENHANCER) [facility], See Admin Instructions, Disp: , Rfl:      sertraline (ZOLOFT) 25 MG tablet, Take 75 mg by mouth at bedtime, Disp: , Rfl:      polyethylene glycol (MIRALAX) 17 GM/Dose powder, Take 17 g by mouth daily, Disp: 510 g, Rfl: 0     senna-docusate (SENOKOT-S/PERICOLACE) 8.6-50 MG tablet, Take 1 tablet by mouth 2 times daily (Patient not taking: Reported on 11/22/2024), Disp: 30 tablet, Rfl: 0    Also starting ADHD medications.           Physical Exam:   There were no vitals taken for this visit.   Physical Exam:   General: NAD  Neurologic:   Mental Status Exam: Alert, awake and oriented to situation. No dysarthria. Speech of normal fluency.   Cranial Nerves: Pupils equal, EOMs intact (but uncomfortable), no nystagmus, facial movements symmetric, hearing intact to conversation, tongue midline and fully mobile. No tongue atrophy or fasciculations.    Motor: No drift in upper extremities. Able to stand from a seated position without use of arms. No tremors or abnormal movements noted.   Coordination: With arms outstretched, able to touch nose using index finger accurately bilaterally.  Normal finger tapping bilaterally.     Gait: Normal stance and casual gait.    Neck: Normal range of motion with lateral head movements and neck flexion. Reports neck tension with movements.  Eyes: No conjunctival injection, no scleral icterus.           Data:     CT head (10/2024):  1. Stable positioning of the high right frontal approach  ventriculostomy catheter with slitlike ventricles, unchanged.  2. No acute intracranial pathology.        Again, thank you for allowing me to participate in the care of your patient.      Sincerely,    Court Terrell MD

## 2025-01-22 NOTE — PROGRESS NOTES
Virtual Visit Details    Type of service:  Video Visit   Video Start Time: 1:44 PM  Video End Time: 2:28 PM    Originating Location (pt. Location): Home    Distant Location (provider location):  Off-site  Platform used for Video Visit: [facility]   Virtual Headache Neurology Consult  January 22, 2025     Leigh Sloan MRN# [id number]   YOB: 2000 Age: 24 year old     Requesting physician: Saravanan Patterson MD         Assessment and Recommendations:     Leigh Sloan is a 24 year old female who presents for further evaluation of headache.    Her headache presentation meets criteria for:  Chronic migraine with visual aura    This is complicated by history of IIH, treated with  shunt, POTS, anxiety/depression, chronic pain.    Virtual neurologic examination is intact today, although she does report discomfort with eye movement and head movements.    Most recent head imaging was completed October 2024, CT of the head, which did not show any structural pathology to explain headache.  There is a  shunt in place, she does have some associated discomfort around the shunt.    Today, reviewed chronic migraine with visual aura, current plan for IIH, and I recommended additional symptomatic management of chronic migraine.    For acute treatment, I recommend the following:  - For acute treatment of mild headache, I recommend naproxen 500 mg twice daily as needed, not to exceed more than 14 days/month to avoid medication overuse.  - For acute treatment of moderate to severe headache, I recommend sumatriptan 100 mg at the onset of headache, with a repeat dose in 2 hours if needed.  This should not exceed more than 9 days/month to avoid medication overuse.  - For headache related nausea, she will continue ondansetron as needed.    Her headache frequency and severity warrant prevention.  She currently takes metoprolol, so additional beta blockers will be avoided.    -Tricyclic  antidepressants were considered, but will be avoided at this time due to concurrent use of other mental health medications.  She currently takes sertraline, quetiapine, and recently started a stimulant.  -I recommended trial of topiramate starting at 25 mg nightly, titrating up by 25 mg each week, as needed and tolerated, to a goal dose of 100 mg nightly.  I recommend a 3-month trial prior to determining effectiveness.  Potential side effects were discussed.  - If topiramate is not tolerated or not effective after an adequate trial, CGRP inhibitor, botulinum toxin injections could be considered.    The longitudinal plan of care for Leigh was addressed during this visit. Due to the added complexity in care, I will continue to support Leigh in the subsequent management of this condition(s) and with the ongoing continuity of care of this condition(s).  I will plan to see her back in 3 months to monitor her progress.    Court Terrell MD  Neurology            Chief Complaint:     Chief Complaint   Patient presents with    Headache           History is obtained from the patient and medical record.  Patient was seen via a virtual visit in their home.      Leigh Sloan is a 24 year old female who has been living with headaches since 2014. She reports headache, migraine, photophobia, vomiting. This is variable in frequency and severity. In the last three months, she reports more symptoms in light.    Headaches are associated with vomiting and visual symptoms, usually.    Headache is pressure/sharp around shunt, and sharp over either side of head, around her eyes, base of head and neck. Pain rates up to 8/10 on a bad pain day. Lasts a couple days on average.  She reports 23/30 headache days per month, with 5/30 severe headache days per month.    Associated with photophobia, osmophobia, nausea, vomiting. She gets motion sick easily.    Has had  shunt, and related surgeries.  Describes blood flowing sound in head,  worsened headache, blacking out of vision. Papilledema found in 2020, a few months later, and shunt placed. Shunt replaced in January and February 2024 after shunt was infected and stopped working.  -Sees neurosurgery, Dolly Garcia, for adjustments; wonders if pressure is too low.   -Sees ophthalmology Dr. Brand for eye exams. UTD.    Also gets visual symptoms: transparent dark dots, rainbow with oily texture, or random sparkly dots. They last a day to a few weeks.     Sometimes can't see briefly upon standing. Wonders if related to POTS. Can be unsteady when walking.     For treatment, she takes ondansetron as needed for nausea.   Takes Advil or Aleve as needed, which help somewhat.    She denies previous migraine specific medication.    Allergic to Diamox.            Past Medical History:     Past Medical History:   Diagnosis Date    Acid reflux     Anxiety     Asthma     Depression     High cholesterol     IIH (idiopathic intracranial hypertension)     Non-alcoholic fatty liver disease     PCOS (polycystic ovarian syndrome)     PTSD (post-traumatic stress disorder)     Scoliosis     Tachycardia               Past Surgical History:     Past Surgical History:   Procedure Laterality Date    CV HEART CATHETERIZATION WITH POSSIBLE INTERVENTION      IMPLANT SHUNT VENTRICULOPERITONEAL      IR LUMBAR DRAIN PLACEMENT W FLUORO  01/30/2024    LAPAROSCOPIC ASSISTED IMPLANT SHUNT VENTRICULOPERITONEAL N/A 2/8/2024    Procedure: Laparoscopic Assisted Implant Ventriculoperitoneal Shunt;  Surgeon: Abraham Zepeda MD;  Location: [facility] OR    OPTICAL TRACKING SYSTEM IMPLANT SHUNT VENTRICULOPERITONEAL Right 01/22/2024    Procedure: Stealth guided right sided ventriculoperitoneal shunt exploration and replacement of right sided proximal ventricular catheter and shunt valve;  Surgeon: Calvin Cabrera MD;  Location: [facility] OR    OPTICAL TRACKING SYSTEM IMPLANT SHUNT VENTRICULOPERITONEAL Right 2/8/2024    Procedure: Stealth assisted  RIGHT Implant Ventriculoperitoneal Shunt and Lumbar drain removal;  Surgeon: Calvin Cabrera MD;  Location: [facility] OR    PICC DOUBLE LUMEN PLACEMENT Left 01/31/2024    left basilic 4 fr dl power picc 48 cm    REMOVE SHUNT VENTRICULOPERITONEAL Right 1/29/2024    Procedure: REMOVAL, SHUNT, VENTRICULOPERITONEAL;  Surgeon: Calvin Cabrera MD;  Location: [facility] OR             Social History:   She is not currently working due to disability.    She drinks caffeine infrequently at low amounts.    Social History     Socioeconomic History    Marital status: Single     Spouse name: Not on file    Number of children: Not on file    Years of education: Not on file    Highest education level: Not on file   Occupational History    Not on file   Tobacco Use    Smoking status: Never    Smokeless tobacco: Never   Substance and Sexual Activity    Alcohol use: Not Currently    Drug use: Not Currently     Comment: Gummies on occasion    Sexual activity: Not on file   Other Topics Concern    Not on file   Social History Narrative    Not on file     Social Drivers of Health     Financial Resource Strain: Low Risk  (1/14/2025)    Received from [facility]    Financial Resource Strain     Difficulty of Paying Living Expenses: 3     Difficulty of Paying Living Expenses: Not on file   Food Insecurity: No Food Insecurity (1/14/2025)    Received from [facility]    Food Insecurity     Do you worry your food will run out before you are able to buy more?: 1   Transportation Needs: Unmet Transportation Needs (1/14/2025)    [facility]    Transportation Needs     Does lack of transportation keep you from medical appointments?: 2     Does lack of transportation keep you from work, meetings or getting things that you need?: 2   Physical Activity: High Risk (8/4/2021)    [facility]     Physical Activity     Do you exercise?: No     How many days do you exercise per week?: Not on file     How intense is your typical exercise?: Not on file   Stress: High Risk (8/4/2021)    [email or web address] [email or web address]    Stress     How well do you handle stress in your life?: I often have problems coping   Social Connections: Socially Integrated (1/14/2025)    Received from [facility]    Social Connections     Do you often feel lonely or isolated from those around you?: 0   Interpersonal Safety: Not on file   Housing Stability: High Risk (1/14/2025)    Received from [facility]    Housing Stability     What is your housing situation today?: 3             Family History:     Dad with headaches and photophobia.    Family History   Problem Relation Age of Onset    Anxiety Disorder Mother     Ataxia Mother     Depression Mother     Diabetes Mother     Fibromyalgia Mother     Hypertension Mother     Anxiety Disorder Father     Depression Father     Diabetes Father     Anxiety Disorder Sister     Bipolar Disorder Sister     Fibromyalgia Sister     Breast Cancer Paternal Grandmother     Skin Cancer Paternal Grandmother     Colon Cancer Paternal Grandfather     Nystagmus No family hx of     Strabismus No family hx of     Amblyopia No family hx of     Glasses (<7 y/o) No family hx of              Allergies:      Allergies   Allergen Reactions    Diamox [Acetazolamide] Hives    Sulfa Antibiotics Unknown    Sulfamethoxazole-Trimethoprim Nausea and Nausea and Vomiting    Trimethoprim Rash             Medications:     Current Outpatient Medications:     albuterol (PROAIR HFA) 108 (90 Base) MCG/ACT inhaler, INHALE 2 PUFFS BY MOUTH FOUR TIMES A DAY AS NEEDED, Disp: , Rfl:     budesonide-formoterol (SYMBICORT) 80-4.5 MCG/ACT Inhaler, Inhale 2 puffs into the lungs as needed, Disp: , Rfl:     cetirizine (ZYRTEC) 10 MG tablet, Take 10 mg by mouth  as needed, Disp: , Rfl:     cholecalciferol (VITAMIN D3) 125 mcg (5000 units) capsule, Take 125 mcg by mouth daily., Disp: , Rfl:     cyclobenzaprine (FLEXERIL) 10 MG tablet, Take 10 mg by mouth 3 times daily as needed, Disp: , Rfl:     drospirenone-ethinyl estradiol (SAMMY) 3-0.02 MG tablet, Take 1 tablet by mouth at bedtime, Disp: , Rfl:     hydrOXYzine (VISTARIL) 25 MG capsule, Take 25 mg by mouth as needed, Disp: , Rfl:     meclizine (ANTIVERT) 25 MG tablet, Take 12.5-25 mg by mouth 3 times daily as needed, Disp: , Rfl:     Melatonin 10 MG CHEW, Take 10 mg by mouth, Disp: , Rfl:     meloxicam (MOBIC) 15 MG tablet, Take 1 tablet (15 mg) by mouth at bedtime, Disp: , Rfl:     metoprolol succinate ER (TOPROL XL) 50 MG 24 hr tablet, Take 1 tablet by mouth at bedtime, Disp: , Rfl:     omeprazole (PRILOSEC) 20 MG DR capsule, Take 20 mg by mouth at bedtime, Disp: , Rfl:     prazosin (MINIPRESS) 2 MG capsule, Take 1 capsule by mouth at bedtime, Disp: , Rfl:     QUEtiapine Fumarate 150 MG TABS, Take 1 tablet by mouth at bedtime, Disp: , Rfl:     Respiratory Therapy Supplies (ACE AEROSOL CLOUD ENHANCER) [facility] [place] Inst[place], Disp: , Rfl:     sertraline (ZOLOFT) 25 MG tablet, Take 75 mg by mouth at bedtime, Disp: , Rfl:     polyethylene glycol (MIRALAX) 17 GM/Dose powder, Take 17 g by mouth daily, Disp: 510 g, Rfl: 0    senna-docusate (SENOKOT-S/PERICOLACE) 8.6-50 MG tablet, Take 1 tablet by mouth 2 times daily (Patient not taking: Reported on 11/22/2024), Disp: 30 tablet, Rfl: 0    Also starting ADHD medications.          Physical Exam:   There were no vitals taken for this visit.   Physical Exam:   General: NAD  Neurologic:   Mental Status Exam: Alert, awake and oriented to situation. No dysarthria. Speech of normal fluency.   Cranial Nerves: Pupils equal, EOMs intact (but uncomfortable), no nystagmus, facial movements symmetric, hearing intact to conversation, tongue midline and fully mobile. No tongue atrophy or  fasciculations.    Motor: No drift in upper extremities. Able to stand from a seated position without use of arms. No tremors or abnormal movements noted.   Coordination: With arms outstretched, able to touch nose using index finger accurately bilaterally.  Normal finger tapping bilaterally.     Gait: Normal stance and casual gait.    Neck: Normal range of motion with lateral head movements and neck flexion. Reports neck tension with movements.  Eyes: No conjunctival injection, no scleral icterus.           Data:     CT head (10/2024):  1. Stable positioning of the high right frontal approach  ventriculostomy catheter with slitlike ventricles, unchanged.  2. No acute intracranial pathology.

## 2025-01-23 ENCOUNTER — TELEPHONE (OUTPATIENT)
Dept: NEUROLOGY | Facility: CLINIC | Age: 25
End: 2025-01-23
Payer: MEDICAID

## 2025-01-23 NOTE — TELEPHONE ENCOUNTER
Patient confirmed scheduled appointment:  Date: 5/14/25  Time: 11am  Visit type: Return Headache  Provider: Nidhi  Location: Virtual  Testing/imaging: NA  Additional notes: 3 month follow up    Carmela Mendiola on 1/23/2025 at 4:50 PM

## 2025-01-29 ENCOUNTER — OFFICE VISIT (OUTPATIENT)
Dept: NEUROSURGERY | Facility: CLINIC | Age: 25
End: 2025-01-29
Payer: MEDICAID

## 2025-01-29 VITALS
RESPIRATION RATE: 16 BRPM | BODY MASS INDEX: 42.06 KG/M2 | HEIGHT: 66 IN | HEART RATE: 83 BPM | SYSTOLIC BLOOD PRESSURE: 109 MMHG | DIASTOLIC BLOOD PRESSURE: 60 MMHG | OXYGEN SATURATION: 98 % | WEIGHT: 261.7 LBS

## 2025-01-29 DIAGNOSIS — G93.2 IIH (IDIOPATHIC INTRACRANIAL HYPERTENSION): Primary | ICD-10-CM

## 2025-01-29 DIAGNOSIS — Z98.2 S/P VP SHUNT: ICD-10-CM

## 2025-01-29 RX ORDER — METHYLPHENIDATE HYDROCHLORIDE 18 MG/1
18 TABLET, EXTENDED RELEASE ORAL EVERY MORNING
COMMUNITY
Start: 2025-01-08

## 2025-01-29 ASSESSMENT — PAIN SCALES - GENERAL: PAINLEVEL_OUTOF10: MILD PAIN (3)

## 2025-01-29 NOTE — PROGRESS NOTES
"  [facility]  Department of Neurosurgery  [facility]    Name: Leigh Sloan  MRN: [id number]  Age: 24 year old  : 2025      Chief Complaint:   Idiopathic intracranial hypertension s/p right occipital  shunt placement  (Hakim, placed in [place]) with recurrent papilledema and shunt malfunction s/p right occipital  shunt revision 2024 c/b positive intraoperative cultures s/p shunt removal 2024 and redo  shunt placement 2024 (Certas at 5), follow up visit     History of Present Illness:   Leigh Sloan is a 24 year old female with a history of IIH who is seen today for follow up. I last met with her 2024 after an ED visit, where she had developed positional headaches and dizziness. Her  shunt was reprogrammed to 5 at that time due to slit like ventricles on imaging workup. At our last visit, she was feeling better without substantial change or new headaches. Today she reports that she's had worsening pressure pain in her posterior head and neck. She associates this with too low of a pressure in regards to her shunt. The pain is worse when she's standing. She denies any vision changes. She feels otherwise well without new fever, chills, nausea, vomiting, paresthesias, weakness, or gait instability.     Review of Systems:   Pertinent items are noted in HPI or as in patient entered ROS below, remainder of complete ROS is negative.     Physical Exam:   /60 (BP Location: Right arm, Patient Position: Sitting, Cuff Size: Adult Large)   Pulse 83   Resp 16   Ht 1.664 m (5' 5.5\")   Wt 118.7 kg (261 lb 11.2 oz)   SpO2 98%   BMI 42.89 kg/m     General: No acute distress.    Eyes: Conjunctivae are normal.  MSK: Moves all extremities.  No obvious deformity.  Neuro: The patient is fully oriented. Speech is normal. Facial nerve function is normal, rated as a House Brackmann 1. Gait is normal.   Psych: Normal mood " and affect. Behavior is normal.      Procedure:  With the patient's verbal permission, her Certas valve was interrogated and found to be set to 5. Rechecked x 2. Using the magnet, this was reprogrammed to 6. Rechecked x 2. The patient tolerated this well.    Imaging:  No new imaging to review today     Assessment:  Idiopathic intracranial hypertension s/p right occipital  shunt placement 2020 (Hakim, placed in [place]) with recurrent papilledema and shunt malfunction s/p right occipital  shunt revision 1/22/2024 c/b positive intraoperative cultures s/p shunt removal 1/29/2024 and redo  shunt placement 2/08/2024 (Certas at 6), follow up visit     Plan:  After discussion today, I dialed Leigh's shunt to a 6. We reviewed risks of this and symptoms to watch for. I'll follow up with her in 2 weeks to check in, and she was encouraged to reach out sooner as needed with new concerns.       Dolly Garcia PA-C  Department of Neurosurgery     Price (Do Not Change): 0.00 Detail Level: Generalized Instructions: This plan will send the code FBSE to the PM system.  DO NOT or CHANGE the price.

## 2025-01-29 NOTE — NURSING NOTE
"Chief Complaint   Patient presents with    RECHECK     Shunt Check Neurosurg       /60 (BP Location: Right arm, Patient Position: Sitting, Cuff Size: Adult Large)   Pulse 83   Resp 16   Ht 1.664 m (5' 5.5\")   Wt 118.7 kg (261 lb 11.2 oz)   SpO2 98%   BMI 42.89 kg/m        Arti Villanueva    "

## 2025-02-05 ENCOUNTER — TELEPHONE (OUTPATIENT)
Dept: NEUROSURGERY | Facility: CLINIC | Age: 25
End: 2025-02-05
Payer: MEDICAID

## 2025-02-05 NOTE — TELEPHONE ENCOUNTER
Called patient and scheduled appointment w/ Dolly Garcia per Dolly Garcia via [employer] message. -KB

## 2025-05-14 ENCOUNTER — VIRTUAL VISIT (OUTPATIENT)
Dept: NEUROLOGY | Facility: CLINIC | Age: 25
End: 2025-05-14
Payer: COMMERCIAL

## 2025-05-14 DIAGNOSIS — G43.109 MIGRAINE WITH AURA AND WITHOUT STATUS MIGRAINOSUS, NOT INTRACTABLE: Primary | ICD-10-CM

## 2025-05-14 RX ORDER — NAPROXEN 500 MG/1
500 TABLET ORAL 2 TIMES DAILY PRN
Qty: 28 TABLET | Refills: 11 | Status: SHIPPED | OUTPATIENT
Start: 2025-05-14

## 2025-05-14 RX ORDER — FREMANEZUMAB-VFRM 225 MG/1.5ML
1.5 INJECTION SUBCUTANEOUS
Qty: 1.5 ML | Refills: 11 | Status: SHIPPED | OUTPATIENT
Start: 2025-05-14

## 2025-05-14 RX ORDER — TOPIRAMATE 50 MG/1
50 TABLET, FILM COATED ORAL EVERY EVENING
Qty: 90 TABLET | Refills: 3 | Status: SHIPPED | OUTPATIENT
Start: 2025-05-14

## 2025-05-14 RX ORDER — SUMATRIPTAN SUCCINATE 100 MG/1
100 TABLET ORAL
Qty: 18 TABLET | Refills: 11 | Status: SHIPPED | OUTPATIENT
Start: 2025-05-14

## 2025-05-14 ASSESSMENT — HEADACHE IMPACT TEST (HIT 6)
HOW OFTEN DID HEADACHS LIMIT CONCENTRATION ON WORK OR DAILY ACTIVITY: ALWAYS
WHEN YOU HAVE A HEADACHE HOW OFTEN DO YOU WISH YOU COULD LIE DOWN: ALWAYS
HOW OFTEN HAVE YOU FELT TOO TIRED TO WORK BECAUSE OF YOUR HEADACHES: ALWAYS
HOW OFTEN DO HEADACHES LIMIT YOUR DAILY ACTIVITIES: VERY OFTEN
WHEN YOU HAVE HEADACHES HOW OFTEN IS THE PAIN SEVERE: SOMETIMES
HIT6 TOTAL SCORE: 73
HOW OFTEN HAVE YOU FELT FED UP OR IRRITATED BECAUSE OF YOUR HEADACHES: ALWAYS
WHEN YOU HAVE A HEADACHE HOW OFTEN DO YOU WISH YOU COULD LIE DOWN: ALWAYS
HIT6 TOTAL SCORE: 73
HOW OFTEN DO HEADACHES LIMIT YOUR DAILY ACTIVITIES: VERY OFTEN
HOW OFTEN HAVE YOU FELT TOO TIRED TO WORK BECAUSE OF YOUR HEADACHES: ALWAYS
HOW OFTEN DID HEADACHS LIMIT CONCENTRATION ON WORK OR DAILY ACTIVITY: ALWAYS
WHEN YOU HAVE HEADACHES HOW OFTEN IS THE PAIN SEVERE: SOMETIMES
HOW OFTEN HAVE YOU FELT FED UP OR IRRITATED BECAUSE OF YOUR HEADACHES: ALWAYS

## 2025-05-14 ASSESSMENT — MIGRAINE DISABILITY ASSESSMENT (MIDAS)
HOW MANY DAYS WAS HOUSEWORK PRODUCTIVITY CUT IN HALF DUE TO HEADACHES: 21
HOW MANY DAYS DID YOU NOT DO HOUSEWORK BECAUSE OF HEADACHES: 21
ON A SCALE FROM 0-10 ON AVERAGE HOW PAINFUL WERE HEADACHES: 4
HOW MANY DAYS WAS YOUR PRODUCTIVITY CUT IN HALF BECAUSE OF HEADACHES: 0
HOW MANY DAYS IN THE PAST 3 MONTHS HAVE YOU HAD A HEADACHE: 40
HOW OFTEN WERE SOCIAL ACTIVITIES MISSED DUE TO HEADACHES: 0
HOW MANY DAYS DID YOU MISS WORK OR SCHOOL BECAUSE OF HEADACHES: 0
TOTAL SCORE: 42

## 2025-05-14 NOTE — NURSING NOTE
Current patient location: [address] [facility] [address]    Is the patient currently in the state of [place]? YES    Visit mode: VIDEO    If the visit is dropped, the patient can be reconnected by:TELEPHONE VISIT: Phone number:   Telephone Information:   Mobile [phone]       Will anyone else be joining the visit? NO  (If patient encounters technical issues they should call [phone])    Are changes needed to the allergy or medication list? Pt stated no med changes    Are refills needed on medications prescribed by this physician? NO    Rooming Documentation:  Questionnaire(s) completed    Reason for visit: Headache    Ofelia SCHAEFERF

## 2025-05-14 NOTE — PROGRESS NOTES
Virtual Visit Details    Type of service:  Video Visit   Video Start Time: 11:13 AM  Video End Time:11:26 AM    Originating Location (pt. Location): Home    Distant Location (provider location):  Off-site  Platform used for Video Visit: [facility]    Headache Neurology Progress Note  May 14, 2025      Assessment/Plan:   Leigh Sloan is a 25 year old woman who returns for follow up of chronic migraine with visual aura.  This is complicated by history of IIH, treated with  shunt, POTS, anxiety/depression, chronic pain.     Most recent head imaging was completed October 2024, CT of the head, which did not show any structural pathology to explain headache.  There is a  shunt in place, she does have some associated discomfort around the shunt.     Topiramate is helpful at maximum tolerated dose of 50 mg at bedtime. Will continue this and add a CGRP inhibitor.     For acute treatment, I recommend the following:  - For acute treatment of mild headache, I recommend naproxen 500 mg twice daily as needed, not to exceed more than 14 days/month to avoid medication overuse.  - For acute treatment of moderate to severe headache, I recommend sumatriptan 100 mg at the onset of headache, with a repeat dose in 2 hours if needed.  This should not exceed more than 9 days/month to avoid medication overuse.  - No longer has headache-related nausea.     Her headache frequency and severity warrant prevention.  She currently takes metoprolol, so additional beta blockers will be avoided.    -Tricyclic antidepressants were considered, but will be avoided at this time due to concurrent use of other mental health medications.  She currently takes sertraline, quetiapine, and recently started a stimulant.  -I recommended continuing topiramate 50 mg nightly.  This is the maximum tolerated dose.  -I recommend adding Ajovy subcutaneous injection every 30 days. Potential side effects discussed. I  recommend a 3-6 month trial.  - If not tolerated or not effective after an adequate trial, an alternative CGRP inhibitor, botulinum toxin injections could be considered.    The longitudinal plan of care for Leigh was addressed during this visit. Due to the added complexity in care, I will continue to support Leigh in the subsequent management of this condition(s) and with the ongoing continuity of care of this condition(s). I will see her back in 3-6 months.    Court Terrell MD  Neurology     Subjective:    Leigh Sloan returns for follow up of chronic migraine.    Since last visit, she tried topiramate, and found too much tingling over 50 mg, intolerable. Taking 50 mg nightly.    She has not thrown up, which is her main migraine symptom.     She continues to have visual symptoms, little dots moving in her vision.    She reports about 12-15 attacks per month currently.    Triggers: sunlight, driving    Acute treatment with naproxen for mild attacks and sumatriptan for severe attacks are helpful. She has only used these a few times.    Cholecystectomy in March 2025, denies other health changes.    Objective:    Vitals: There were no vitals taken for this visit.  General: Cooperative, NAD  Neurologic:  Mental Status: Alert, attentive and oriented. Speech clear and fluent, although slow.   Cranial Nerves: Facial movements symmetric.   Motor: No abnormal movements.      Pertinent Investigations:          1/22/2025     1:03 PM 5/14/2025    11:04 AM   HIT-6   When you have headaches, how often is the pain severe 10 10    How often do headaches limit your ability to do usual daily activities including household work, work, school, or social activities? 10 11    When you have a headache, how often do you wish you could lie down? 13 13    In the past 4 weeks, how often have you felt too tired to do work or daily activities because of your headaches 10 13    In the past 4 weeks, how often have you felt fed up or  irritated because of your headaches 11 13    In the past 4 weeks, how often did headaches limit your ability to concentrate on work or daily activities 11 13    HIT-6 Total Score 65  73        Patient-reported    Proxy-reported           1/22/2025     1:05 PM   MIDAS - in the past three months:   On how many days did you miss work or school because of your headaches? 0   How many days was your productivity at work or school reduced by half or more because of your headaches? 0   On how many days did you not do household work because of your headaches? 20   How many days was your productivity in household work reduced by half or more because of your headaches? 20   On how many days did you miss family, social, or leisure activities because of your headaches? 0   On how many days did you have a headache? 30   On a scale of 0-10, on average how painful were these headaches? 5   MIDAS Score 40 (IV - Severe Disability)

## 2025-05-14 NOTE — LETTER
5/14/2025       RE: Leigh Sloan  [address] [facility] [address]     Dear Colleague,    Thank you for referring your patient, Leigh Sloan, to the [facility] NEUROLOGY CLINIC [place] at [facility]. Please see a copy of my visit note below.    Virtual Visit Details    Type of service:  Video Visit   Video Start Time: 11:13 AM  Video End Time:11:26 AM    Originating Location (pt. Location): Home    Distant Location (provider location):  Off-site  Platform used for Video Visit: [facility]    Headache Neurology Progress Note  May 14, 2025      Assessment/Plan:   Leigh Sloan is a 25 year old woman who returns for follow up of chronic migraine with visual aura.  This is complicated by history of IIH, treated with  shunt, POTS, anxiety/depression, chronic pain.     Most recent head imaging was completed October 2024, CT of the head, which did not show any structural pathology to explain headache.  There is a  shunt in place, she does have some associated discomfort around the shunt.     Topiramate is helpful at maximum tolerated dose of 50 mg at bedtime. Will continue this and add a CGRP inhibitor.     For acute treatment, I recommend the following:  - For acute treatment of mild headache, I recommend naproxen 500 mg twice daily as needed, not to exceed more than 14 days/month to avoid medication overuse.  - For acute treatment of moderate to severe headache, I recommend sumatriptan 100 mg at the onset of headache, with a repeat dose in 2 hours if needed.  This should not exceed more than 9 days/month to avoid medication overuse.  - No longer has headache-related nausea.     Her headache frequency and severity warrant prevention.  She currently takes metoprolol, so additional beta blockers will be avoided.    -Tricyclic antidepressants were considered, but will be avoided at this time due to  concurrent use of other mental health medications.  She currently takes sertraline, quetiapine, and recently started a stimulant.  -I recommended continuing topiramate 50 mg nightly.  This is the maximum tolerated dose.  -I recommend adding Ajovy subcutaneous injection every 30 days. Potential side effects discussed. I recommend a 3-6 month trial.  - If not tolerated or not effective after an adequate trial, an alternative CGRP inhibitor, botulinum toxin injections could be considered.    The longitudinal plan of care for Leigh was addressed during this visit. Due to the added complexity in care, I will continue to support Leigh in the subsequent management of this condition(s) and with the ongoing continuity of care of this condition(s). I will see her back in 3-6 months.    Court Terrell MD  Neurology     Subjective:    Leigh Sloan returns for follow up of chronic migraine.    Since last visit, she tried topiramate, and found too much tingling over 50 mg, intolerable. Taking 50 mg nightly.    She has not thrown up, which is her main migraine symptom.     She continues to have visual symptoms, little dots moving in her vision.    She reports about 12-15 attacks per month currently.    Triggers: sunlight, driving    Acute treatment with naproxen for mild attacks and sumatriptan for severe attacks are helpful. She has only used these a few times.    Cholecystectomy in March 2025, denies other health changes.    Objective:    Vitals: There were no vitals taken for this visit.  General: Cooperative, NAD  Neurologic:  Mental Status: Alert, attentive and oriented. Speech clear and fluent, although slow.   Cranial Nerves: Facial movements symmetric.   Motor: No abnormal movements.      Pertinent Investigations:          1/22/2025     1:03 PM 5/14/2025    11:04 AM   HIT-6   When you have headaches, how often is the pain severe 10 10    How often do headaches limit your ability to do usual daily activities  including household work, work, school, or social activities? 10 11    When you have a headache, how often do you wish you could lie down? 13 13    In the past 4 weeks, how often have you felt too tired to do work or daily activities because of your headaches 10 13    In the past 4 weeks, how often have you felt fed up or irritated because of your headaches 11 13    In the past 4 weeks, how often did headaches limit your ability to concentrate on work or daily activities 11 13    HIT-6 Total Score 65  73        Patient-reported    Proxy-reported           1/22/2025     1:05 PM   MIDAS - in the past three months:   On how many days did you miss work or school because of your headaches? 0   How many days was your productivity at work or school reduced by half or more because of your headaches? 0   On how many days did you not do household work because of your headaches? 20   How many days was your productivity in household work reduced by half or more because of your headaches? 20   On how many days did you miss family, social, or leisure activities because of your headaches? 0   On how many days did you have a headache? 30   On a scale of 0-10, on average how painful were these headaches? 5   MIDAS Score 40 (IV - Severe Disability)          Again, thank you for allowing me to participate in the care of your patient.      Sincerely,    Court Terrell MD

## 2025-05-21 ENCOUNTER — OFFICE VISIT (OUTPATIENT)
Dept: NEUROSURGERY | Facility: CLINIC | Age: 25
End: 2025-05-21
Attending: PHYSICIAN ASSISTANT
Payer: COMMERCIAL

## 2025-05-21 VITALS
SYSTOLIC BLOOD PRESSURE: 107 MMHG | RESPIRATION RATE: 16 BRPM | OXYGEN SATURATION: 98 % | DIASTOLIC BLOOD PRESSURE: 66 MMHG | HEART RATE: 89 BPM

## 2025-05-21 DIAGNOSIS — Z98.2 S/P VP SHUNT: ICD-10-CM

## 2025-05-21 DIAGNOSIS — G93.2 IIH (IDIOPATHIC INTRACRANIAL HYPERTENSION): Primary | ICD-10-CM

## 2025-05-21 NOTE — LETTER
2025       RE: Leigh Sloan  [address] [facility] [address]     Dear Colleague,    Thank you for referring your patient, Leigh Sloan, to the [facility] NEUROSURGERY CLINIC [place] at [facility]. Please see a copy of my visit note below.      [facility]  Department of Neurosurgery  Center for Skull Base and Pituitary Surgery    Name: Leigh Sloan  MRN: [id number]  Age: 25 year old  : 2025      Chief Complaint:   Idiopathic intracranial hypertension s/p right occipital  shunt placement  (Hakim, placed in [place]) with recurrent papilledema and shunt malfunction s/p right occipital  shunt revision 2024 c/b positive intraoperative cultures s/p shunt removal 2024 and redo  shunt placement 2024 (Certas at 5), follow up visit     History of Present Illness:   Leigh Sloan is a 25 year old female with a history of idiopathic intracranial hypertension who is seen today for follow up. Our last meeting was in 2025 at which time Leigh was complaining of worsening sharp pain on the top of her head which she associated with high pressure; she requested that her shunt be dialed back down to 5 that day. Today she reports feeling well; she's uncertain if she initiated this visit or if someone called her to schedule but either way we had discussed 1 year follow up at her last visit in February this year. She has no new concerns today.    Physical Exam:   /66 (BP Location: Right arm, Patient Position: Sitting)   Pulse 89   Resp 16   SpO2 98%     Procedure:  With the patient's verbal permission, Certas valve was interrogated and found to be set to 5. Rechecked x 2.      Assessment:  Idiopathic intracranial hypertension s/p right occipital  shunt placement  (Hakim, placed in [place]) with recurrent papilledema and shunt malfunction s/p right occipital   shunt revision 1/22/2024 c/b positive intraoperative cultures s/p shunt removal 1/29/2024 and redo  shunt placement 2/08/2024 (Certas at 5), follow up visit     Plan:  Follow up in 1 year as previously planned.     No charge for this visit please, scheduling error.       Dolly Garcia PA-C  Department of Neurosurgery      Again, thank you for allowing me to participate in the care of your patient.      Sincerely,    Dolly Garcia PA-C

## 2025-05-21 NOTE — PROGRESS NOTES
[facility]  Department of Neurosurgery  Center for Skull Base and Pituitary Surgery    Name: Leigh Sloan  MRN: [id number]  Age: 25 year old  : 2025      Chief Complaint:   Idiopathic intracranial hypertension s/p right occipital  shunt placement  (Hakim, placed in [place]) with recurrent papilledema and shunt malfunction s/p right occipital  shunt revision 2024 c/b positive intraoperative cultures s/p shunt removal 2024 and redo  shunt placement 2024 (Certas at 5), follow up visit     History of Present Illness:   Leigh Sloan is a 25 year old female with a history of idiopathic intracranial hypertension who is seen today for follow up. Our last meeting was in 2025 at which time Leigh was complaining of worsening sharp pain on the top of her head which she associated with high pressure; she requested that her shunt be dialed back down to 5 that day. Today she reports feeling well; she's uncertain if she initiated this visit or if someone called her to schedule but either way we had discussed 1 year follow up at her last visit in February this year. She has no new concerns today.    Physical Exam:   /66 (BP Location: Right arm, Patient Position: Sitting)   Pulse 89   Resp 16   SpO2 98%     Procedure:  With the patient's verbal permission, Certas valve was interrogated and found to be set to 5. Rechecked x 2.      Assessment:  Idiopathic intracranial hypertension s/p right occipital  shunt placement  (Hakim, placed in [place]) with recurrent papilledema and shunt malfunction s/p right occipital  shunt revision 2024 c/b positive intraoperative cultures s/p shunt removal 2024 and redo  shunt placement 2024 (Certas at 5), follow up visit     Plan:  Follow up in 1 year as previously planned.     No charge for this visit please, scheduling error.       Dolly Garcia PA-C  Department of Neurosurgery

## 2025-06-18 ENCOUNTER — TELEPHONE (OUTPATIENT)
Dept: NEUROSURGERY | Facility: CLINIC | Age: 25
End: 2025-06-18
Payer: COMMERCIAL

## 2025-06-18 NOTE — TELEPHONE ENCOUNTER
Patient Contacted for the patient to call back and schedule the following    [facility]  Provider: Dolly Garcia  Appointment type: Return Adult neurovascular  Appointment mode in clinic  Return date: 2-20-26    Specialty phone number: [phone]    Is Imaging Needed: N  Imaging Phone Number to provide to patient: NA    Additional Notes: Patient unable to schedule at the time of the call, needs to be called back.

## (undated) DEVICE — PREP CHLORAPREP CLEAR 3ML 930400

## (undated) DEVICE — SU SILK 2-0 TIE 12X30" A305H

## (undated) DEVICE — ESU GROUND PAD ADULT W/CORD E7507

## (undated) DEVICE — SU VICRYL 3-0 SH 8X18" UND J864D

## (undated) DEVICE — PREP CHLORAPREP 26ML TINTED HI-LITE ORANGE 930815

## (undated) DEVICE — SU VICRYL 0 TIE 54" J608H

## (undated) DEVICE — SU ETHILON 3-0 PS-1 18" 1663H

## (undated) DEVICE — DRAPE SHEET REV FOLD 3/4 9349

## (undated) DEVICE — SU MONOCRYL 3-0 PS-1 27" Y936H

## (undated) DEVICE — SYR 03ML LL W/O NDL 309657

## (undated) DEVICE — PAD CHUX UNDERPAD 23X24" 7136

## (undated) DEVICE — SPONGE SURGIFOAM 12 1972

## (undated) DEVICE — TRACKER PATIENT NON-INVASIVE AXIEM 9734887XOM

## (undated) DEVICE — PREP POVIDONE-IODINE 7.5% SCRUB 4OZ BOTTLE MDS093945

## (undated) DEVICE — PACK NEURO MINOR UMMC SNE32MNMU4

## (undated) DEVICE — GLOVE BIOGEL PI MICRO SZ 7.5 48575

## (undated) DEVICE — SYR BULB IRRIG DOVER 60 ML LATEX FREE 67000

## (undated) DEVICE — CODMAN® CERTAS® PLUS PROGRAMMABLE VALVE INLINE VALVE ONLY WITH SIPHONGUARD®
Type: IMPLANTABLE DEVICE | Site: CRANIAL | Status: NON-FUNCTIONAL
Brand: CODMAN CERTAS® PLUS

## (undated) DEVICE — GLOVE BIOGEL PI MICRO SZ 7.0 48570

## (undated) DEVICE — TRACKER PATIENT NON-INVASIVE AXIEM 9734887

## (undated) DEVICE — SPONGE COTTONOID 1/2X1/2" 20-04S

## (undated) DEVICE — LINEN TOWEL PACK X5 5464

## (undated) DEVICE — DRAPE POUCH INSTRUMENT 1018

## (undated) DEVICE — NDL BUTTERFLY 23GA .75" 367297

## (undated) DEVICE — DRSG PRIMAPORE 03 1/8X6" 66000318

## (undated) DEVICE — ENDO TROCAR FIRST ENTRY KII FIOS Z-THRD 05X100MM CTF03

## (undated) DEVICE — NDL INSUFFLATION 13GA 120MM C2201

## (undated) DEVICE — ENDO TROCAR FIRST ENTRY KII FIOS Z-THRD 12X100MM CTF73

## (undated) DEVICE — DECANTER VIAL 2006S

## (undated) DEVICE — MANOMETER VENOUS PRESSURE W/4-WAY STOPCOCK 35ML MX441

## (undated) DEVICE — ENDO TROCAR SLEEVE KII Z-THREADED 05X100MM CTS02

## (undated) DEVICE — SPONGE SURGIFOAM 100 1974

## (undated) DEVICE — GLOVE BIOGEL PI MICRO INDICATOR UNDERGLOVE SZ 7.5 48975

## (undated) DEVICE — APPLICATOR COTTON TIP 3" 9325

## (undated) DEVICE — LINEN TOWEL PACK X6 WHITE 5487

## (undated) DEVICE — PREP SKIN SCRUB TRAY 4461A

## (undated) DEVICE — SUCTION MANIFOLD NEPTUNE 2 SYS 4 PORT 0702-020-000

## (undated) DEVICE — SU VICRYL 2-0 CT-2 CR 8X18" J726D

## (undated) DEVICE — SU DERMABOND ADVANCED .7ML DNX12

## (undated) DEVICE — CONNECTOR STOPCOCK 3 WAY MALE LL HI-FLO MX9311L

## (undated) DEVICE — SUTURE BOOTS 051003PBX

## (undated) DEVICE — NDL 21GA 1.5"

## (undated) DEVICE — TUBING SMOKE EVAC PNEUMOCLEAR HIGH FLOW 0620050250

## (undated) DEVICE — ANTIFOG SOLUTION W/FOAM PAD 31142527

## (undated) DEVICE — SOL WATER IRRIG 1000ML BOTTLE 2F7114

## (undated) DEVICE — NDL BLUNT 17GA 1.5" 8881202330

## (undated) DEVICE — OINTMENT ANTIBIOTIC BACITRACIN ZINC .9 G 1171

## (undated) DEVICE — SU MONOCRYL 4-0 PS-2 27" UND Y426H

## (undated) DEVICE — ENDO TROCAR FIRST ENTRY KII FIOS ADV FIX 05X100MM CFF03

## (undated) DEVICE — TUBING SUCTION 10'X3/16" N510

## (undated) DEVICE — SHUNT STYLET 23CM AXIEM NAVIGATION SYSTEM 9735428

## (undated) DEVICE — SHUNT TUNNELER SHEATH 70CM NT9MD170

## (undated) DEVICE — SU MONOCRYL 4-0 P-3 18" UND Y494G

## (undated) DEVICE — DRAPE IOBAN INCISE 23X17" 6650EZ

## (undated) DEVICE — TEST TUBE W/SCREW CAP 17361

## (undated) DEVICE — LABEL MEDICATION SYSTEM 3303-P

## (undated) DEVICE — TUBING SMOKE EVAC 3/8"X10' 0702-045-023

## (undated) DEVICE — SOL ADH LIQUID BENZOIN SWAB 0.6ML C1544

## (undated) DEVICE — PREP POVIDONE-IODINE 10% SOLUTION 4OZ BOTTLE MDS093944

## (undated) DEVICE — PERFORATOR CRANIAL DGR-O SURGICAL 11-14MM PEDS 200-271

## (undated) DEVICE — DRSG PRIMAPORE 02X3" 7133

## (undated) DEVICE — DRSG KERLIX 4 1/2"X4YDS ROLL 6715

## (undated) DEVICE — DRAPE C-ARM W/STRAPS 42X72" 07-CA104

## (undated) DEVICE — BLADE KNIFE SURG 11 371111

## (undated) DEVICE — DRAPE CONVERTORS U-DRAPE 60X72" 8476

## (undated) DEVICE — DEVICE SUTURE GRASPER TROCAR CLOSURE 14GA PMITCSG

## (undated) DEVICE — DRAPE POUCH IRR 1016

## (undated) DEVICE — CATH VA INTR 10FRX14CM KIT

## (undated) DEVICE — NDL INSUFFLATION 13GA 150MM C2202

## (undated) DEVICE — SU NUROLON 4-0 TF CR 8X18" C584D

## (undated) DEVICE — SHUNT TUNNELER SHEATH 61CM NT901124

## (undated) DEVICE — SURGICEL HEMOSTAT 4X8" 1952

## (undated) DEVICE — NDL ANGIOCATH 14GA 1.25" 4048

## (undated) DEVICE — STPL SKIN 35W ROTATING HEAD PRW35

## (undated) RX ORDER — OXYCODONE HYDROCHLORIDE 5 MG/1
TABLET ORAL
Status: DISPENSED
Start: 2024-01-22

## (undated) RX ORDER — FENTANYL CITRATE 50 UG/ML
INJECTION, SOLUTION INTRAMUSCULAR; INTRAVENOUS
Status: DISPENSED
Start: 2024-02-08

## (undated) RX ORDER — CEFAZOLIN SODIUM/WATER 2 G/20 ML
SYRINGE (ML) INTRAVENOUS
Status: DISPENSED
Start: 2024-02-08

## (undated) RX ORDER — CEFAZOLIN SODIUM 1 G/3ML
INJECTION, POWDER, FOR SOLUTION INTRAMUSCULAR; INTRAVENOUS
Status: DISPENSED
Start: 2024-01-22

## (undated) RX ORDER — LIDOCAINE HYDROCHLORIDE 10 MG/ML
INJECTION, SOLUTION EPIDURAL; INFILTRATION; INTRACAUDAL; PERINEURAL
Status: DISPENSED
Start: 2024-01-30

## (undated) RX ORDER — PROPOFOL 10 MG/ML
INJECTION, EMULSION INTRAVENOUS
Status: DISPENSED
Start: 2024-02-08

## (undated) RX ORDER — DEXAMETHASONE SODIUM PHOSPHATE 4 MG/ML
INJECTION, SOLUTION INTRA-ARTICULAR; INTRALESIONAL; INTRAMUSCULAR; INTRAVENOUS; SOFT TISSUE
Status: DISPENSED
Start: 2024-01-22

## (undated) RX ORDER — CEFAZOLIN SODIUM/WATER 2 G/20 ML
SYRINGE (ML) INTRAVENOUS
Status: DISPENSED
Start: 2024-01-29

## (undated) RX ORDER — VANCOMYCIN HYDROCHLORIDE 1 G/20ML
INJECTION, POWDER, LYOPHILIZED, FOR SOLUTION INTRAVENOUS
Status: DISPENSED
Start: 2024-01-22

## (undated) RX ORDER — OXYCODONE HYDROCHLORIDE 10 MG/1
TABLET ORAL
Status: DISPENSED
Start: 2024-02-08

## (undated) RX ORDER — LIDOCAINE HYDROCHLORIDE AND EPINEPHRINE 10; 10 MG/ML; UG/ML
INJECTION, SOLUTION INFILTRATION; PERINEURAL
Status: DISPENSED
Start: 2024-01-22

## (undated) RX ORDER — HYDROMORPHONE HCL IN WATER/PF 6 MG/30 ML
PATIENT CONTROLLED ANALGESIA SYRINGE INTRAVENOUS
Status: DISPENSED
Start: 2024-02-08

## (undated) RX ORDER — FENTANYL CITRATE 50 UG/ML
INJECTION, SOLUTION INTRAMUSCULAR; INTRAVENOUS
Status: DISPENSED
Start: 2024-01-29

## (undated) RX ORDER — ACETAMINOPHEN 325 MG/1
TABLET ORAL
Status: DISPENSED
Start: 2024-01-22

## (undated) RX ORDER — PROPOFOL 10 MG/ML
INJECTION, EMULSION INTRAVENOUS
Status: DISPENSED
Start: 2024-01-22

## (undated) RX ORDER — SODIUM CHLORIDE 9 MG/ML
INJECTION, SOLUTION INTRAVENOUS
Status: DISPENSED
Start: 2024-01-29

## (undated) RX ORDER — DEXAMETHASONE SODIUM PHOSPHATE 4 MG/ML
INJECTION, SOLUTION INTRA-ARTICULAR; INTRALESIONAL; INTRAMUSCULAR; INTRAVENOUS; SOFT TISSUE
Status: DISPENSED
Start: 2024-02-08

## (undated) RX ORDER — BUPIVACAINE HYDROCHLORIDE AND EPINEPHRINE 2.5; 5 MG/ML; UG/ML
INJECTION, SOLUTION EPIDURAL; INFILTRATION; INTRACAUDAL; PERINEURAL
Status: DISPENSED
Start: 2024-01-22

## (undated) RX ORDER — FENTANYL CITRATE 50 UG/ML
INJECTION, SOLUTION INTRAMUSCULAR; INTRAVENOUS
Status: DISPENSED
Start: 2024-01-22

## (undated) RX ORDER — ACETAMINOPHEN 325 MG/1
TABLET ORAL
Status: DISPENSED
Start: 2024-01-29

## (undated) RX ORDER — HYDROMORPHONE HYDROCHLORIDE 1 MG/ML
INJECTION, SOLUTION INTRAMUSCULAR; INTRAVENOUS; SUBCUTANEOUS
Status: DISPENSED
Start: 2024-01-29

## (undated) RX ORDER — CEFAZOLIN SODIUM/WATER 3 G/30 ML
SYRINGE (ML) INTRAVENOUS
Status: DISPENSED
Start: 2024-01-22

## (undated) RX ORDER — SCOLOPAMINE TRANSDERMAL SYSTEM 1 MG/1
PATCH, EXTENDED RELEASE TRANSDERMAL
Status: DISPENSED
Start: 2024-01-22

## (undated) RX ORDER — ONDANSETRON 2 MG/ML
INJECTION INTRAMUSCULAR; INTRAVENOUS
Status: DISPENSED
Start: 2024-02-08

## (undated) RX ORDER — PROPOFOL 10 MG/ML
INJECTION, EMULSION INTRAVENOUS
Status: DISPENSED
Start: 2024-01-29

## (undated) RX ORDER — ONDANSETRON 2 MG/ML
INJECTION INTRAMUSCULAR; INTRAVENOUS
Status: DISPENSED
Start: 2024-01-22

## (undated) RX ORDER — HYDROMORPHONE HCL IN WATER/PF 6 MG/30 ML
PATIENT CONTROLLED ANALGESIA SYRINGE INTRAVENOUS
Status: DISPENSED
Start: 2024-01-29

## (undated) RX ORDER — SODIUM CHLORIDE 9 MG/ML
INJECTION, SOLUTION INTRAVENOUS
Status: DISPENSED
Start: 2024-01-22

## (undated) RX ORDER — FENTANYL CITRATE 50 UG/ML
INJECTION, SOLUTION INTRAMUSCULAR; INTRAVENOUS
Status: DISPENSED
Start: 2024-01-30

## (undated) RX ORDER — ALBUTEROL SULFATE 0.83 MG/ML
SOLUTION RESPIRATORY (INHALATION)
Status: DISPENSED
Start: 2024-01-22

## (undated) RX ORDER — ACETAMINOPHEN 325 MG/1
TABLET ORAL
Status: DISPENSED
Start: 2024-02-08

## (undated) RX ORDER — BUPIVACAINE HYDROCHLORIDE AND EPINEPHRINE 2.5; 5 MG/ML; UG/ML
INJECTION, SOLUTION EPIDURAL; INFILTRATION; INTRACAUDAL; PERINEURAL
Status: DISPENSED
Start: 2024-01-29

## (undated) RX ORDER — GENTAMICIN 40 MG/ML
INJECTION, SOLUTION INTRAMUSCULAR; INTRAVENOUS
Status: DISPENSED
Start: 2024-02-08

## (undated) RX ORDER — HYDROMORPHONE HYDROCHLORIDE 1 MG/ML
INJECTION, SOLUTION INTRAMUSCULAR; INTRAVENOUS; SUBCUTANEOUS
Status: DISPENSED
Start: 2024-01-22

## (undated) RX ORDER — FENTANYL CITRATE-0.9 % NACL/PF 10 MCG/ML
PLASTIC BAG, INJECTION (ML) INTRAVENOUS
Status: DISPENSED
Start: 2024-02-08

## (undated) RX ORDER — SODIUM CHLORIDE 9 MG/ML
INJECTION, SOLUTION INTRAVENOUS
Status: DISPENSED
Start: 2024-02-08

## (undated) RX ORDER — GENTAMICIN 40 MG/ML
INJECTION, SOLUTION INTRAMUSCULAR; INTRAVENOUS
Status: DISPENSED
Start: 2024-01-22

## (undated) RX ORDER — ONDANSETRON 2 MG/ML
INJECTION INTRAMUSCULAR; INTRAVENOUS
Status: DISPENSED
Start: 2024-01-29

## (undated) RX ORDER — FENTANYL CITRATE-0.9 % NACL/PF 10 MCG/ML
PLASTIC BAG, INJECTION (ML) INTRAVENOUS
Status: DISPENSED
Start: 2024-01-29

## (undated) RX ORDER — GENTAMICIN 40 MG/ML
INJECTION, SOLUTION INTRAMUSCULAR; INTRAVENOUS
Status: DISPENSED
Start: 2024-01-29

## (undated) RX ORDER — SODIUM CHLORIDE, SODIUM LACTATE, POTASSIUM CHLORIDE, CALCIUM CHLORIDE 600; 310; 30; 20 MG/100ML; MG/100ML; MG/100ML; MG/100ML
INJECTION, SOLUTION INTRAVENOUS
Status: DISPENSED
Start: 2024-01-22

## (undated) RX ORDER — ATROPINE SULFATE 0.4 MG/ML
AMPUL (ML) INJECTION
Status: DISPENSED
Start: 2024-01-22

## (undated) RX ORDER — DEXAMETHASONE SODIUM PHOSPHATE 4 MG/ML
INJECTION, SOLUTION INTRA-ARTICULAR; INTRALESIONAL; INTRAMUSCULAR; INTRAVENOUS; SOFT TISSUE
Status: DISPENSED
Start: 2024-01-29